# Patient Record
Sex: FEMALE | Race: WHITE | NOT HISPANIC OR LATINO
[De-identification: names, ages, dates, MRNs, and addresses within clinical notes are randomized per-mention and may not be internally consistent; named-entity substitution may affect disease eponyms.]

---

## 2018-04-18 ENCOUNTER — RESULT REVIEW (OUTPATIENT)
Age: 63
End: 2018-04-18

## 2018-12-10 PROBLEM — Z00.00 ENCOUNTER FOR PREVENTIVE HEALTH EXAMINATION: Status: ACTIVE | Noted: 2018-12-10

## 2019-01-01 ENCOUNTER — TRANSCRIPTION ENCOUNTER (OUTPATIENT)
Age: 64
End: 2019-01-01

## 2019-01-01 ENCOUNTER — INPATIENT (INPATIENT)
Facility: HOSPITAL | Age: 64
LOS: 7 days | Discharge: HOME HEALTH | End: 2019-12-24
Attending: INTERNAL MEDICINE | Admitting: INTERNAL MEDICINE
Payer: COMMERCIAL

## 2019-01-01 VITALS
WEIGHT: 179.9 LBS | SYSTOLIC BLOOD PRESSURE: 114 MMHG | HEIGHT: 72 IN | OXYGEN SATURATION: 99 % | DIASTOLIC BLOOD PRESSURE: 65 MMHG | RESPIRATION RATE: 18 BRPM | HEART RATE: 102 BPM | TEMPERATURE: 98 F

## 2019-01-01 VITALS
SYSTOLIC BLOOD PRESSURE: 103 MMHG | TEMPERATURE: 97 F | HEART RATE: 98 BPM | RESPIRATION RATE: 16 BRPM | DIASTOLIC BLOOD PRESSURE: 68 MMHG

## 2019-01-01 DIAGNOSIS — C79.9 SECONDARY MALIGNANT NEOPLASM OF UNSPECIFIED SITE: ICD-10-CM

## 2019-01-01 DIAGNOSIS — R60.1 GENERALIZED EDEMA: ICD-10-CM

## 2019-01-01 DIAGNOSIS — J90 PLEURAL EFFUSION, NOT ELSEWHERE CLASSIFIED: ICD-10-CM

## 2019-01-01 DIAGNOSIS — R06.09 OTHER FORMS OF DYSPNEA: ICD-10-CM

## 2019-01-01 DIAGNOSIS — C7B.09 SECONDARY CARCINOID TUMORS OF OTHER SITES: ICD-10-CM

## 2019-01-01 DIAGNOSIS — C7A.8 OTHER MALIGNANT NEUROENDOCRINE TUMORS: ICD-10-CM

## 2019-01-01 DIAGNOSIS — I07.1 RHEUMATIC TRICUSPID INSUFFICIENCY: ICD-10-CM

## 2019-01-01 DIAGNOSIS — I50.813 ACUTE ON CHRONIC RIGHT HEART FAILURE: ICD-10-CM

## 2019-01-01 DIAGNOSIS — I27.20 PULMONARY HYPERTENSION, UNSPECIFIED: ICD-10-CM

## 2019-01-01 DIAGNOSIS — C80.1 MALIGNANT (PRIMARY) NEOPLASM, UNSPECIFIED: ICD-10-CM

## 2019-01-01 LAB
ALBUMIN SERPL ELPH-MCNC: 2.9 G/DL — LOW (ref 3.5–5.2)
ALBUMIN SERPL ELPH-MCNC: 3.1 G/DL — LOW (ref 3.5–5.2)
ALBUMIN SERPL ELPH-MCNC: 3.1 G/DL — LOW (ref 3.5–5.2)
ALP SERPL-CCNC: 197 U/L — HIGH (ref 30–115)
ALP SERPL-CCNC: 209 U/L — HIGH (ref 30–115)
ALP SERPL-CCNC: 219 U/L — HIGH (ref 30–115)
ALT FLD-CCNC: 18 U/L — SIGNIFICANT CHANGE UP (ref 0–41)
ALT FLD-CCNC: 18 U/L — SIGNIFICANT CHANGE UP (ref 0–41)
ALT FLD-CCNC: 20 U/L — SIGNIFICANT CHANGE UP (ref 0–41)
ANION GAP SERPL CALC-SCNC: 11 MMOL/L — SIGNIFICANT CHANGE UP (ref 7–14)
ANION GAP SERPL CALC-SCNC: 12 MMOL/L — SIGNIFICANT CHANGE UP (ref 7–14)
ANION GAP SERPL CALC-SCNC: 12 MMOL/L — SIGNIFICANT CHANGE UP (ref 7–14)
ANION GAP SERPL CALC-SCNC: 13 MMOL/L — SIGNIFICANT CHANGE UP (ref 7–14)
ANION GAP SERPL CALC-SCNC: 13 MMOL/L — SIGNIFICANT CHANGE UP (ref 7–14)
ANION GAP SERPL CALC-SCNC: 15 MMOL/L — HIGH (ref 7–14)
ANION GAP SERPL CALC-SCNC: 15 MMOL/L — HIGH (ref 7–14)
ANION GAP SERPL CALC-SCNC: 9 MMOL/L — SIGNIFICANT CHANGE UP (ref 7–14)
APPEARANCE UR: CLEAR — SIGNIFICANT CHANGE UP
AST SERPL-CCNC: 27 U/L — SIGNIFICANT CHANGE UP (ref 0–41)
AST SERPL-CCNC: 30 U/L — SIGNIFICANT CHANGE UP (ref 0–41)
AST SERPL-CCNC: 41 U/L — SIGNIFICANT CHANGE UP (ref 0–41)
BACTERIA # UR AUTO: ABNORMAL
BASE EXCESS BLDV CALC-SCNC: 2.1 MMOL/L — HIGH (ref -2–2)
BASOPHILS # BLD AUTO: 0.01 K/UL — SIGNIFICANT CHANGE UP (ref 0–0.2)
BASOPHILS NFR BLD AUTO: 0.1 % — SIGNIFICANT CHANGE UP (ref 0–1)
BILIRUB SERPL-MCNC: 0.3 MG/DL — SIGNIFICANT CHANGE UP (ref 0.2–1.2)
BILIRUB SERPL-MCNC: 0.4 MG/DL — SIGNIFICANT CHANGE UP (ref 0.2–1.2)
BILIRUB SERPL-MCNC: 0.5 MG/DL — SIGNIFICANT CHANGE UP (ref 0.2–1.2)
BILIRUB UR-MCNC: ABNORMAL
BUN SERPL-MCNC: 25 MG/DL — HIGH (ref 10–20)
BUN SERPL-MCNC: 25 MG/DL — HIGH (ref 10–20)
BUN SERPL-MCNC: 27 MG/DL — HIGH (ref 10–20)
BUN SERPL-MCNC: 27 MG/DL — HIGH (ref 10–20)
BUN SERPL-MCNC: 31 MG/DL — HIGH (ref 10–20)
BUN SERPL-MCNC: 35 MG/DL — HIGH (ref 10–20)
BUN SERPL-MCNC: 38 MG/DL — HIGH (ref 10–20)
BUN SERPL-MCNC: 38 MG/DL — HIGH (ref 10–20)
CA-I SERPL-SCNC: 1.18 MMOL/L — SIGNIFICANT CHANGE UP (ref 1.12–1.3)
CALCIUM SERPL-MCNC: 8.3 MG/DL — LOW (ref 8.5–10.1)
CALCIUM SERPL-MCNC: 8.3 MG/DL — LOW (ref 8.5–10.1)
CALCIUM SERPL-MCNC: 8.5 MG/DL — SIGNIFICANT CHANGE UP (ref 8.5–10.1)
CALCIUM SERPL-MCNC: 8.5 MG/DL — SIGNIFICANT CHANGE UP (ref 8.5–10.1)
CALCIUM SERPL-MCNC: 8.7 MG/DL — SIGNIFICANT CHANGE UP (ref 8.5–10.1)
CHLORIDE SERPL-SCNC: 100 MMOL/L — SIGNIFICANT CHANGE UP (ref 98–110)
CHLORIDE SERPL-SCNC: 101 MMOL/L — SIGNIFICANT CHANGE UP (ref 98–110)
CHLORIDE SERPL-SCNC: 98 MMOL/L — SIGNIFICANT CHANGE UP (ref 98–110)
CHLORIDE SERPL-SCNC: 98 MMOL/L — SIGNIFICANT CHANGE UP (ref 98–110)
CHLORIDE SERPL-SCNC: 99 MMOL/L — SIGNIFICANT CHANGE UP (ref 98–110)
CHLORIDE SERPL-SCNC: 99 MMOL/L — SIGNIFICANT CHANGE UP (ref 98–110)
CO2 SERPL-SCNC: 23 MMOL/L — SIGNIFICANT CHANGE UP (ref 17–32)
CO2 SERPL-SCNC: 25 MMOL/L — SIGNIFICANT CHANGE UP (ref 17–32)
CO2 SERPL-SCNC: 25 MMOL/L — SIGNIFICANT CHANGE UP (ref 17–32)
CO2 SERPL-SCNC: 26 MMOL/L — SIGNIFICANT CHANGE UP (ref 17–32)
CO2 SERPL-SCNC: 27 MMOL/L — SIGNIFICANT CHANGE UP (ref 17–32)
COLOR SPEC: YELLOW — SIGNIFICANT CHANGE UP
COMMENT - URINE: SIGNIFICANT CHANGE UP
CREAT SERPL-MCNC: 0.7 MG/DL — SIGNIFICANT CHANGE UP (ref 0.7–1.5)
CREAT SERPL-MCNC: 0.8 MG/DL — SIGNIFICANT CHANGE UP (ref 0.7–1.5)
CREAT SERPL-MCNC: 0.8 MG/DL — SIGNIFICANT CHANGE UP (ref 0.7–1.5)
CREAT SERPL-MCNC: 0.9 MG/DL — SIGNIFICANT CHANGE UP (ref 0.7–1.5)
CREAT SERPL-MCNC: 1 MG/DL — SIGNIFICANT CHANGE UP (ref 0.7–1.5)
CREAT SERPL-MCNC: 1 MG/DL — SIGNIFICANT CHANGE UP (ref 0.7–1.5)
DIFF PNL FLD: NEGATIVE — SIGNIFICANT CHANGE UP
EOSINOPHIL # BLD AUTO: 0 K/UL — SIGNIFICANT CHANGE UP (ref 0–0.7)
EOSINOPHIL NFR BLD AUTO: 0 % — SIGNIFICANT CHANGE UP (ref 0–8)
EPI CELLS # UR: ABNORMAL /HPF
GAS PNL BLDV: 139 MMOL/L — SIGNIFICANT CHANGE UP (ref 136–145)
GAS PNL BLDV: SIGNIFICANT CHANGE UP
GLUCOSE SERPL-MCNC: 101 MG/DL — HIGH (ref 70–99)
GLUCOSE SERPL-MCNC: 102 MG/DL — HIGH (ref 70–99)
GLUCOSE SERPL-MCNC: 109 MG/DL — HIGH (ref 70–99)
GLUCOSE SERPL-MCNC: 118 MG/DL — HIGH (ref 70–99)
GLUCOSE SERPL-MCNC: 121 MG/DL — HIGH (ref 70–99)
GLUCOSE SERPL-MCNC: 92 MG/DL — SIGNIFICANT CHANGE UP (ref 70–99)
GLUCOSE SERPL-MCNC: 95 MG/DL — SIGNIFICANT CHANGE UP (ref 70–99)
GLUCOSE SERPL-MCNC: 99 MG/DL — SIGNIFICANT CHANGE UP (ref 70–99)
GLUCOSE UR QL: NEGATIVE MG/DL — SIGNIFICANT CHANGE UP
HCO3 BLDV-SCNC: 28 MMOL/L — SIGNIFICANT CHANGE UP (ref 22–29)
HCT VFR BLD CALC: 29.2 % — LOW (ref 37–47)
HCT VFR BLD CALC: 30 % — LOW (ref 37–47)
HCT VFR BLD CALC: 31.3 % — LOW (ref 37–47)
HCT VFR BLD CALC: 31.6 % — LOW (ref 37–47)
HCT VFR BLD CALC: 31.8 % — LOW (ref 37–47)
HCT VFR BLD CALC: 32 % — LOW (ref 37–47)
HCT VFR BLD CALC: 32.3 % — LOW (ref 37–47)
HCT VFR BLD CALC: 34 % — LOW (ref 37–47)
HCT VFR BLDA CALC: 33.5 % — LOW (ref 34–44)
HCV AB S/CO SERPL IA: 0.16 S/CO — SIGNIFICANT CHANGE UP (ref 0–0.99)
HCV AB SERPL-IMP: SIGNIFICANT CHANGE UP
HGB BLD CALC-MCNC: 10.9 G/DL — LOW (ref 14–18)
HGB BLD-MCNC: 10 G/DL — LOW (ref 12–16)
HGB BLD-MCNC: 10.2 G/DL — LOW (ref 12–16)
HGB BLD-MCNC: 10.6 G/DL — LOW (ref 12–16)
HGB BLD-MCNC: 10.7 G/DL — LOW (ref 12–16)
HGB BLD-MCNC: 10.7 G/DL — LOW (ref 12–16)
HGB BLD-MCNC: 10.8 G/DL — LOW (ref 12–16)
HGB BLD-MCNC: 11.1 G/DL — LOW (ref 12–16)
HGB BLD-MCNC: 11.3 G/DL — LOW (ref 12–16)
HYALINE CASTS # UR AUTO: ABNORMAL /LPF
IMM GRANULOCYTES NFR BLD AUTO: 0.6 % — HIGH (ref 0.1–0.3)
KETONES UR-MCNC: NEGATIVE — SIGNIFICANT CHANGE UP
LACTATE BLDV-MCNC: 1.9 MMOL/L — HIGH (ref 0.5–1.6)
LACTATE SERPL-SCNC: 2.1 MMOL/L — HIGH (ref 0.7–2)
LEUKOCYTE ESTERASE UR-ACNC: NEGATIVE — SIGNIFICANT CHANGE UP
LYMPHOCYTES # BLD AUTO: 0.6 K/UL — LOW (ref 1.2–3.4)
LYMPHOCYTES # BLD AUTO: 8.8 % — LOW (ref 20.5–51.1)
MAGNESIUM SERPL-MCNC: 1.8 MG/DL — SIGNIFICANT CHANGE UP (ref 1.8–2.4)
MAGNESIUM SERPL-MCNC: 2 MG/DL — SIGNIFICANT CHANGE UP (ref 1.8–2.4)
MCHC RBC-ENTMCNC: 33.2 G/DL — SIGNIFICANT CHANGE UP (ref 32–37)
MCHC RBC-ENTMCNC: 33.4 G/DL — SIGNIFICANT CHANGE UP (ref 32–37)
MCHC RBC-ENTMCNC: 33.4 G/DL — SIGNIFICANT CHANGE UP (ref 32–37)
MCHC RBC-ENTMCNC: 33.5 PG — HIGH (ref 27–31)
MCHC RBC-ENTMCNC: 33.6 PG — HIGH (ref 27–31)
MCHC RBC-ENTMCNC: 33.9 G/DL — SIGNIFICANT CHANGE UP (ref 32–37)
MCHC RBC-ENTMCNC: 33.9 G/DL — SIGNIFICANT CHANGE UP (ref 32–37)
MCHC RBC-ENTMCNC: 33.9 PG — HIGH (ref 27–31)
MCHC RBC-ENTMCNC: 34 G/DL — SIGNIFICANT CHANGE UP (ref 32–37)
MCHC RBC-ENTMCNC: 34.2 G/DL — SIGNIFICANT CHANGE UP (ref 32–37)
MCHC RBC-ENTMCNC: 34.3 PG — HIGH (ref 27–31)
MCHC RBC-ENTMCNC: 34.4 PG — HIGH (ref 27–31)
MCHC RBC-ENTMCNC: 34.5 PG — HIGH (ref 27–31)
MCHC RBC-ENTMCNC: 34.9 G/DL — SIGNIFICANT CHANGE UP (ref 32–37)
MCV RBC AUTO: 100 FL — HIGH (ref 81–99)
MCV RBC AUTO: 100.3 FL — HIGH (ref 81–99)
MCV RBC AUTO: 100.6 FL — HIGH (ref 81–99)
MCV RBC AUTO: 100.7 FL — HIGH (ref 81–99)
MCV RBC AUTO: 100.9 FL — HIGH (ref 81–99)
MCV RBC AUTO: 101 FL — HIGH (ref 81–99)
MCV RBC AUTO: 98.5 FL — SIGNIFICANT CHANGE UP (ref 81–99)
MCV RBC AUTO: 99.1 FL — HIGH (ref 81–99)
MONOCYTES # BLD AUTO: 0.57 K/UL — SIGNIFICANT CHANGE UP (ref 0.1–0.6)
MONOCYTES NFR BLD AUTO: 8.3 % — SIGNIFICANT CHANGE UP (ref 1.7–9.3)
NEUTROPHILS # BLD AUTO: 5.62 K/UL — SIGNIFICANT CHANGE UP (ref 1.4–6.5)
NEUTROPHILS NFR BLD AUTO: 82.2 % — HIGH (ref 42.2–75.2)
NITRITE UR-MCNC: NEGATIVE — SIGNIFICANT CHANGE UP
NRBC # BLD: 0 /100 WBCS — SIGNIFICANT CHANGE UP (ref 0–0)
NT-PROBNP SERPL-SCNC: 1614 PG/ML — HIGH (ref 0–300)
PCO2 BLDV: 47 MMHG — SIGNIFICANT CHANGE UP (ref 41–51)
PH BLDV: 7.37 — SIGNIFICANT CHANGE UP (ref 7.26–7.43)
PH UR: 5.5 — SIGNIFICANT CHANGE UP (ref 5–8)
PLATELET # BLD AUTO: 164 K/UL — SIGNIFICANT CHANGE UP (ref 130–400)
PLATELET # BLD AUTO: 205 K/UL — SIGNIFICANT CHANGE UP (ref 130–400)
PLATELET # BLD AUTO: 211 K/UL — SIGNIFICANT CHANGE UP (ref 130–400)
PLATELET # BLD AUTO: 214 K/UL — SIGNIFICANT CHANGE UP (ref 130–400)
PLATELET # BLD AUTO: 215 K/UL — SIGNIFICANT CHANGE UP (ref 130–400)
PLATELET # BLD AUTO: 217 K/UL — SIGNIFICANT CHANGE UP (ref 130–400)
PLATELET # BLD AUTO: 217 K/UL — SIGNIFICANT CHANGE UP (ref 130–400)
PLATELET # BLD AUTO: 231 K/UL — SIGNIFICANT CHANGE UP (ref 130–400)
PO2 BLDV: 26 MMHG — SIGNIFICANT CHANGE UP (ref 20–40)
POTASSIUM BLDV-SCNC: 4.3 MMOL/L — SIGNIFICANT CHANGE UP (ref 3.3–5.6)
POTASSIUM SERPL-MCNC: 3.9 MMOL/L — SIGNIFICANT CHANGE UP (ref 3.5–5)
POTASSIUM SERPL-MCNC: 4 MMOL/L — SIGNIFICANT CHANGE UP (ref 3.5–5)
POTASSIUM SERPL-MCNC: 4 MMOL/L — SIGNIFICANT CHANGE UP (ref 3.5–5)
POTASSIUM SERPL-MCNC: 4.1 MMOL/L — SIGNIFICANT CHANGE UP (ref 3.5–5)
POTASSIUM SERPL-MCNC: 4.1 MMOL/L — SIGNIFICANT CHANGE UP (ref 3.5–5)
POTASSIUM SERPL-MCNC: 4.3 MMOL/L — SIGNIFICANT CHANGE UP (ref 3.5–5)
POTASSIUM SERPL-MCNC: 4.3 MMOL/L — SIGNIFICANT CHANGE UP (ref 3.5–5)
POTASSIUM SERPL-MCNC: 5.5 MMOL/L — HIGH (ref 3.5–5)
POTASSIUM SERPL-SCNC: 3.9 MMOL/L — SIGNIFICANT CHANGE UP (ref 3.5–5)
POTASSIUM SERPL-SCNC: 4 MMOL/L — SIGNIFICANT CHANGE UP (ref 3.5–5)
POTASSIUM SERPL-SCNC: 4 MMOL/L — SIGNIFICANT CHANGE UP (ref 3.5–5)
POTASSIUM SERPL-SCNC: 4.1 MMOL/L — SIGNIFICANT CHANGE UP (ref 3.5–5)
POTASSIUM SERPL-SCNC: 4.1 MMOL/L — SIGNIFICANT CHANGE UP (ref 3.5–5)
POTASSIUM SERPL-SCNC: 4.3 MMOL/L — SIGNIFICANT CHANGE UP (ref 3.5–5)
POTASSIUM SERPL-SCNC: 4.3 MMOL/L — SIGNIFICANT CHANGE UP (ref 3.5–5)
POTASSIUM SERPL-SCNC: 5.5 MMOL/L — HIGH (ref 3.5–5)
PROT SERPL-MCNC: 5.7 G/DL — LOW (ref 6–8)
PROT SERPL-MCNC: 5.7 G/DL — LOW (ref 6–8)
PROT SERPL-MCNC: 6.1 G/DL — SIGNIFICANT CHANGE UP (ref 6–8)
PROT UR-MCNC: 30 MG/DL
RBC # BLD: 2.9 M/UL — LOW (ref 4.2–5.4)
RBC # BLD: 2.97 M/UL — LOW (ref 4.2–5.4)
RBC # BLD: 3.16 M/UL — LOW (ref 4.2–5.4)
RBC # BLD: 3.16 M/UL — LOW (ref 4.2–5.4)
RBC # BLD: 3.18 M/UL — LOW (ref 4.2–5.4)
RBC # BLD: 3.22 M/UL — LOW (ref 4.2–5.4)
RBC # BLD: 3.23 M/UL — LOW (ref 4.2–5.4)
RBC # BLD: 3.37 M/UL — LOW (ref 4.2–5.4)
RBC # FLD: 16.1 % — HIGH (ref 11.5–14.5)
RBC # FLD: 16.3 % — HIGH (ref 11.5–14.5)
RBC # FLD: 16.3 % — HIGH (ref 11.5–14.5)
RBC # FLD: 16.5 % — HIGH (ref 11.5–14.5)
RBC # FLD: 16.7 % — HIGH (ref 11.5–14.5)
RBC # FLD: 16.9 % — HIGH (ref 11.5–14.5)
RBC CASTS # UR COMP ASSIST: SIGNIFICANT CHANGE UP /HPF
SAO2 % BLDV: 35 % — SIGNIFICANT CHANGE UP
SODIUM SERPL-SCNC: 135 MMOL/L — SIGNIFICANT CHANGE UP (ref 135–146)
SODIUM SERPL-SCNC: 136 MMOL/L — SIGNIFICANT CHANGE UP (ref 135–146)
SODIUM SERPL-SCNC: 137 MMOL/L — SIGNIFICANT CHANGE UP (ref 135–146)
SODIUM SERPL-SCNC: 137 MMOL/L — SIGNIFICANT CHANGE UP (ref 135–146)
SODIUM SERPL-SCNC: 138 MMOL/L — SIGNIFICANT CHANGE UP (ref 135–146)
SODIUM SERPL-SCNC: 138 MMOL/L — SIGNIFICANT CHANGE UP (ref 135–146)
SODIUM SERPL-SCNC: 139 MMOL/L — SIGNIFICANT CHANGE UP (ref 135–146)
SODIUM SERPL-SCNC: 139 MMOL/L — SIGNIFICANT CHANGE UP (ref 135–146)
SP GR SPEC: >=1.03 (ref 1.01–1.03)
TROPONIN T SERPL-MCNC: <0.01 NG/ML — SIGNIFICANT CHANGE UP
UROBILINOGEN FLD QL: 0.2 MG/DL — SIGNIFICANT CHANGE UP (ref 0.2–0.2)
WBC # BLD: 5.14 K/UL — SIGNIFICANT CHANGE UP (ref 4.8–10.8)
WBC # BLD: 5.44 K/UL — SIGNIFICANT CHANGE UP (ref 4.8–10.8)
WBC # BLD: 5.85 K/UL — SIGNIFICANT CHANGE UP (ref 4.8–10.8)
WBC # BLD: 5.89 K/UL — SIGNIFICANT CHANGE UP (ref 4.8–10.8)
WBC # BLD: 6.19 K/UL — SIGNIFICANT CHANGE UP (ref 4.8–10.8)
WBC # BLD: 6.62 K/UL — SIGNIFICANT CHANGE UP (ref 4.8–10.8)
WBC # BLD: 6.84 K/UL — SIGNIFICANT CHANGE UP (ref 4.8–10.8)
WBC # BLD: 7.32 K/UL — SIGNIFICANT CHANGE UP (ref 4.8–10.8)
WBC # FLD AUTO: 5.14 K/UL — SIGNIFICANT CHANGE UP (ref 4.8–10.8)
WBC # FLD AUTO: 5.44 K/UL — SIGNIFICANT CHANGE UP (ref 4.8–10.8)
WBC # FLD AUTO: 5.85 K/UL — SIGNIFICANT CHANGE UP (ref 4.8–10.8)
WBC # FLD AUTO: 5.89 K/UL — SIGNIFICANT CHANGE UP (ref 4.8–10.8)
WBC # FLD AUTO: 6.19 K/UL — SIGNIFICANT CHANGE UP (ref 4.8–10.8)
WBC # FLD AUTO: 6.62 K/UL — SIGNIFICANT CHANGE UP (ref 4.8–10.8)
WBC # FLD AUTO: 6.84 K/UL — SIGNIFICANT CHANGE UP (ref 4.8–10.8)
WBC # FLD AUTO: 7.32 K/UL — SIGNIFICANT CHANGE UP (ref 4.8–10.8)
WBC UR QL: SIGNIFICANT CHANGE UP /HPF

## 2019-01-01 PROCEDURE — 99223 1ST HOSP IP/OBS HIGH 75: CPT

## 2019-01-01 PROCEDURE — 99238 HOSP IP/OBS DSCHRG MGMT 30/<: CPT

## 2019-01-01 PROCEDURE — 99233 SBSQ HOSP IP/OBS HIGH 50: CPT

## 2019-01-01 PROCEDURE — 71046 X-RAY EXAM CHEST 2 VIEWS: CPT | Mod: 26

## 2019-01-01 PROCEDURE — 71260 CT THORAX DX C+: CPT | Mod: 26

## 2019-01-01 PROCEDURE — 99232 SBSQ HOSP IP/OBS MODERATE 35: CPT

## 2019-01-01 PROCEDURE — 99497 ADVNCD CARE PLAN 30 MIN: CPT | Mod: 25

## 2019-01-01 PROCEDURE — 71045 X-RAY EXAM CHEST 1 VIEW: CPT | Mod: 26

## 2019-01-01 PROCEDURE — 93970 EXTREMITY STUDY: CPT | Mod: 26

## 2019-01-01 PROCEDURE — 99285 EMERGENCY DEPT VISIT HI MDM: CPT

## 2019-01-01 RX ORDER — HYDROCORTISONE 1 %
1 OINTMENT (GRAM) TOPICAL
Refills: 0 | Status: DISCONTINUED | OUTPATIENT
Start: 2019-01-01 | End: 2019-01-01

## 2019-01-01 RX ORDER — FUROSEMIDE 40 MG
40 TABLET ORAL ONCE
Refills: 0 | Status: COMPLETED | OUTPATIENT
Start: 2019-01-01 | End: 2019-01-01

## 2019-01-01 RX ORDER — FUROSEMIDE 40 MG
20 TABLET ORAL DAILY
Refills: 0 | Status: DISCONTINUED | OUTPATIENT
Start: 2019-01-01 | End: 2019-01-01

## 2019-01-01 RX ORDER — FUROSEMIDE 40 MG
40 TABLET ORAL DAILY
Refills: 0 | Status: DISCONTINUED | OUTPATIENT
Start: 2019-01-01 | End: 2019-01-01

## 2019-01-01 RX ORDER — ENOXAPARIN SODIUM 100 MG/ML
40 INJECTION SUBCUTANEOUS DAILY
Refills: 0 | Status: DISCONTINUED | OUTPATIENT
Start: 2019-01-01 | End: 2019-01-01

## 2019-01-01 RX ORDER — CHLORHEXIDINE GLUCONATE 213 G/1000ML
1 SOLUTION TOPICAL
Refills: 0 | Status: DISCONTINUED | OUTPATIENT
Start: 2019-01-01 | End: 2019-01-01

## 2019-01-01 RX ORDER — FUROSEMIDE 40 MG
1 TABLET ORAL
Qty: 90 | Refills: 0
Start: 2019-01-01 | End: 2020-03-22

## 2019-01-01 RX ORDER — HYDROCORTISONE 1 %
1 OINTMENT (GRAM) TOPICAL ONCE
Refills: 0 | Status: COMPLETED | OUTPATIENT
Start: 2019-01-01 | End: 2019-01-01

## 2019-01-01 RX ORDER — FUROSEMIDE 40 MG
20 TABLET ORAL ONCE
Refills: 0 | Status: COMPLETED | OUTPATIENT
Start: 2019-01-01 | End: 2019-01-01

## 2019-01-01 RX ADMIN — Medication 20 MILLIGRAM(S): at 06:23

## 2019-01-01 RX ADMIN — Medication 40 MILLIGRAM(S): at 10:08

## 2019-01-01 RX ADMIN — ENOXAPARIN SODIUM 40 MILLIGRAM(S): 100 INJECTION SUBCUTANEOUS at 12:28

## 2019-01-01 RX ADMIN — CHLORHEXIDINE GLUCONATE 1 APPLICATION(S): 213 SOLUTION TOPICAL at 12:29

## 2019-01-01 RX ADMIN — Medication 1 SUPPOSITORY(S): at 21:13

## 2019-01-01 RX ADMIN — ENOXAPARIN SODIUM 40 MILLIGRAM(S): 100 INJECTION SUBCUTANEOUS at 11:22

## 2019-01-01 RX ADMIN — Medication 40 MILLIGRAM(S): at 06:11

## 2019-01-01 RX ADMIN — ENOXAPARIN SODIUM 40 MILLIGRAM(S): 100 INJECTION SUBCUTANEOUS at 14:53

## 2019-01-01 RX ADMIN — Medication 40 MILLIGRAM(S): at 21:53

## 2019-01-01 RX ADMIN — Medication 40 MILLIGRAM(S): at 06:01

## 2019-01-01 RX ADMIN — Medication 40 MILLIGRAM(S): at 12:38

## 2019-01-01 RX ADMIN — Medication 40 MILLIGRAM(S): at 05:13

## 2019-01-01 RX ADMIN — Medication 40 MILLIGRAM(S): at 11:14

## 2019-01-01 RX ADMIN — Medication 20 MILLIGRAM(S): at 17:17

## 2019-01-08 ENCOUNTER — APPOINTMENT (OUTPATIENT)
Dept: VASCULAR SURGERY | Facility: CLINIC | Age: 64
End: 2019-01-08
Payer: COMMERCIAL

## 2019-01-08 VITALS — BODY MASS INDEX: 21.13 KG/M2 | HEIGHT: 72 IN | WEIGHT: 156 LBS

## 2019-01-08 DIAGNOSIS — Z78.9 OTHER SPECIFIED HEALTH STATUS: ICD-10-CM

## 2019-01-08 DIAGNOSIS — L30.9 DERMATITIS, UNSPECIFIED: ICD-10-CM

## 2019-01-08 DIAGNOSIS — Z87.891 PERSONAL HISTORY OF NICOTINE DEPENDENCE: ICD-10-CM

## 2019-01-08 DIAGNOSIS — C7A.8 OTHER MALIGNANT NEUROENDOCRINE TUMORS: ICD-10-CM

## 2019-01-08 DIAGNOSIS — I07.1 RHEUMATIC TRICUSPID INSUFFICIENCY: ICD-10-CM

## 2019-01-08 DIAGNOSIS — Z87.19 PERSONAL HISTORY OF OTHER DISEASES OF THE DIGESTIVE SYSTEM: ICD-10-CM

## 2019-01-08 DIAGNOSIS — M79.89 OTHER SPECIFIED SOFT TISSUE DISORDERS: ICD-10-CM

## 2019-01-08 PROCEDURE — 99203 OFFICE O/P NEW LOW 30 MIN: CPT

## 2019-01-08 RX ORDER — CLOBETASOL PROPIONATE 0.5 MG/G
0.05 GEL TOPICAL TWICE DAILY
Qty: 30 | Refills: 0 | Status: ACTIVE | COMMUNITY
Start: 2019-01-08 | End: 1900-01-01

## 2019-01-08 RX ORDER — ASCORBIC ACID 500 MG
TABLET ORAL
Refills: 0 | Status: ACTIVE | COMMUNITY

## 2019-01-08 RX ORDER — MULTIVITAMIN/IRON/FOLIC ACID 18MG-0.4MG
TABLET ORAL
Refills: 0 | Status: ACTIVE | COMMUNITY

## 2019-01-08 RX ORDER — DOCUSATE SODIUM 100 MG/1
TABLET ORAL
Refills: 0 | Status: ACTIVE | COMMUNITY

## 2019-01-08 RX ORDER — UBIDECARENONE/VIT E ACET 100MG-5
CAPSULE ORAL
Refills: 0 | Status: ACTIVE | COMMUNITY

## 2019-01-08 NOTE — ASSESSMENT
[FreeTextEntry1] : The patient is a 63-year-old female with some mild dermatitis antalgic cages to her lower extremities. I recommend for basal cream to be applied to her ankle region daily to see if this improves her symptoms. I also recommend she may benefit from following up with dermatology. No vascular surgery intervention at this time.

## 2019-01-08 NOTE — HISTORY OF PRESENT ILLNESS
[FreeTextEntry1] : Is a 63-year-old female who presents complaining of discoloration and mild edema to her ankles bilaterally. 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

## 2019-12-16 NOTE — ED PROVIDER NOTE - OBJECTIVE STATEMENT
Patient c/o feeling weak, increasing SOB and CASPER over past few weeks, Getting worse past few days. H/o metastatic CA. Leg swelling, cough, no fever. C/o decrease in appetite. Seen by [facility] last week , CT chest and abdomen done, Results in patients phone,

## 2019-12-16 NOTE — ED ADULT TRIAGE NOTE - CHIEF COMPLAINT QUOTE
Patient complaining of difficulty breathing that causes lightheadedness and chest pressure that started 7 days ago. Denies chest pressure at this time [id number] RA on triage.

## 2019-12-16 NOTE — ED ADULT NURSE REASSESSMENT NOTE - NS ED NURSE REASSESS COMMENT FT1
EZEKIEL PAUL PLACE A #22 BF dIFFUSICS ANGIO TO PT RIGHT WRIST - CT TECH NOTIFIED- #22 GAUGE CAN BE INJECTED AT 6.5 ML/SEC / 325PI

## 2019-12-16 NOTE — H&P ADULT - HISTORY OF PRESENT ILLNESS
[age]yo female with history of metastatic cancer (carcinoid?) presents to the ER due to shortness of breath and dizziness with exertion. Has been on lasix in the past

## 2019-12-16 NOTE — H&P ADULT - NSHPLABSRESULTS_GEN_ALL_CORE
< from: CT Chest w/ IV Cont (12.16.19 @ 21:05) >    EXAM:  CT CHEST IC          PROCEDURE DATE:  12/16/2019      IMPRESSION:    No evidence of acute pulmonary emboli.    Bilateral moderate pleural effusions. Mild cardiomegaly with reflux of   contrast within the IVC and hepatic veins compatible with right heart   dysfunction.    Multiple chest wall soft tissue nodular lesions with reactive bony   changes of the adjacent ribs bilaterally. Partially imaged upper   intra-abdominal lymphadenopathy. Mottled appearance of the vertebral   column as above. Findings are consistent with patient's known metastatic   disease.        NAI MCLAUGHLIN M.D., RESIDENT RADIOLOGIST  This document has been electronically signed.  SAIMA FROST M.D., ATTENDING RADIOLOGIST  This document has been electronically signed. Dec 16 2019  9:50PM        < end of copied text >                          11.3   6.84  )-----------( 164      ( 16 Dec 2019 18:46 )             34.0     12-16    138  |  100  |  38<H>  ----------------------------<  109<H>  5.5<H>   |  23  |  1.0    Ca    8.7      16 Dec 2019 18:46  Mg     2.0     12-16    TPro  6.1  /  Alb  3.1<L>  /  TBili  0.5  /  DBili  x   /  AST  41  /  ALT  18  /  AlkPhos  197<H>  12-16          Urinalysis Basic - ( 16 Dec 2019 21:40 )    Color: Yellow / Appearance: Clear / SG: >=1.030 / pH: x  Gluc: x / Ketone: Negative  / Bili: Moderate / Urobili: 0.2 mg/dL   Blood: x / Protein: 30 mg/dL / Nitrite: Negative   Leuk Esterase: Negative / RBC: 1-2 /HPF / WBC 1-2 /HPF   Sq Epi: x / Non Sq Epi: Moderate /HPF / Bacteria: Moderate        Lactate Trend  12-16 @ 18:46 Lactate:2.1     CARDIAC MARKERS ( 16 Dec 2019 18:46 )  x     / <0.01 ng/mL / x     / x     / x          CAPILLARY BLOOD GLUCOSE

## 2019-12-16 NOTE — ED PROVIDER NOTE - ATTENDING CONTRIBUTION TO CARE
65yo F history of carcinoid tumor with diffuse mets followed a [facility] no current active treatment presenting with shortness of breath, CASPER, worsening diffuse swelling over the past few days getting progressively worse. No f/c/n/v/d, chest pain. Had CT CAP last week showing b/l pleural effusions, hepatic mets, hepatic congestion. Per pt, has previously been on diuretics with minimal relief. Pt unable to walk a few steps wo getting significantly shortness of breath   Constitutional: chronically ill appearing Non toxic.   Eyes: PERRLA. Extraocular movements intact, no entrapment. Conjunctiva pale   ENT: No nasal discharge. Moist mucus membranes.  Neck: Supple, non tender, full range of motion.  CV: RRR no murmurs, rubs, or gallops. +S1S2.   Pulm: diminished bilateral bases. Normal work of breathing.  Abd: soft NT ND +BS.   Ext: Warm and well perfused x4, moving all extremities, 2+ pitting edema to the bilateral lower extremities  up to lower abdomen. +LUE edema. 2+ equal pulses throughout   Psy: Cooperative, appropriate.   Skin: Warm, dry, +diffuse anasarca up to abdomen  Neuro: CN2-12 grossly intact no sensory or motor deficits throughout, no drift

## 2019-12-16 NOTE — ED PROVIDER NOTE - CLINICAL SUMMARY MEDICAL DECISION MAKING FREE TEXT BOX
65yo F history of carcinoid tumor with diffuse mets followed a [facility] no current active treatment presenting with shortness of breath, CASPER, worsening diffuse swelling over the past few days getting progressively worse. No f/c/n/v/d, chest pain. Had CT CAP last week showing b/l pleural effusions, hepatic mets, hepatic congestion. Per pt, has previously been on diuretics with minimal relief. Pt unable to walk a few steps wo getting significantly shortness of breath. labs ekg imaging reviewed. will admit

## 2019-12-17 NOTE — PROGRESS NOTE ADULT - PROBLEM SELECTOR PLAN 1
etiology?  echo done today - report pending  on IV lasix - increase dose to 40mg daily  repeat cxr in am to demonstrate improvement in effusions  pt not requiring oxygen

## 2019-12-17 NOTE — PATIENT PROFILE ADULT - NSPROPTRIGHTCAREGIVER_GEN_A_NUR
I spoke over the telephone with Ms Skye Pena for telephone follow-up about her daughter, the patient  The mother states that the patient is doing very well, her symptoms are nearly resolved, and is acting as if she was never even sick "  She has been taking her antibiotics twice daily without difficulty, is tolerating them well at this time  She was able to schedule an appointment with the patient's pediatrician which is scheduled for tomorrow  I asked Ms Skye Pena if she had any further questions at this time, she stated that all her questions have been answered appropriately  no

## 2019-12-17 NOTE — GOALS OF CARE CONVERSATION - ADVANCED CARE PLANNING - CONVERSATION DETAILS
discussed diagnosis and prognosis with the pt  she says she believes she signed a DNR in the past but isn't sure where it its  she would like to think about it and come back to this question tomorrow.

## 2019-12-17 NOTE — PROGRESS NOTE ADULT - SUBJECTIVE AND OBJECTIVE BOX
JANICE COURTNEY  64y  Female      Patient is a 64y old  Female who presents with a chief complaint of shortness of breath.     INTERVAL HPI/OVERNIGHT EVENTS:  Patient seen and examined earlier this afternoon.  She is lying in bed. Not in any acute distress.  Does complain of SOB with exertion.        REVIEW OF SYSTEMS:  CONSTITUTIONAL: No fever, weight loss, or fatigue  EYES: No eye pain, visual disturbances, or discharge  ENMT:  No difficulty hearing, tinnitus, vertigo; No sinus or throat pain  NECK: No pain or stiffness  RESPIRATORY: shortness of breath with exertion   CARDIOVASCULAR: No chest pain, palpitations, dizziness, or leg swelling  GASTROINTESTINAL: No abdominal or epigastric pain. No nausea, vomiting, or hematemesis; No diarrhea or constipation. No melena or hematochezia.  GENITOURINARY: No dysuria, frequency, hematuria, or incontinence  NEUROLOGICAL: No headaches, memory loss, loss of strength, numbness, or tremors  SKIN: No itching, burning, rashes, or lesions   LYMPH NODES: No enlarged glands  ENDOCRINE: No heat or cold intolerance; No hair loss  MUSCULOSKELETAL: No joint pain or swelling; No muscle, back, or extremity pain  PSYCHIATRIC: No depression, anxiety, mood swings, or difficulty sleeping  HEME/LYMPH: No easy bruising, or bleeding gums  ALLERY AND IMMUNOLOGIC: No hives or eczema    T(C): 35.7 (12-17-19 @ 13:02), Max: 36.8 (12-16-19 @ 16:39)  HR: 88 (12-17-19 @ 13:02) (68 - 102)  BP: 97/62 (12-17-19 @ 13:02) (97/62 - 115/65)  RR: 18 (12-17-19 @ 13:02) (18 - 18)  SpO2: 96% (12-17-19 @ 05:50) (96% - 99%)    PHYSICAL EXAM:  GENERAL: NAD, well-groomed, well-developed  HEAD:  Atraumatic, Normocephalic  EYES: EOMI, PERRLA, conjunctiva and sclera clear  ENMT: No tonsillar erythema, exudates, or enlargement; Moist mucous membranes, Good dentition, No lesions  NECK: Supple, No JVD, Normal thyroid  NERVOUS SYSTEM:  Alert & Oriented X3, Good concentration; Motor Strength 5/5 B/L upper and lower extremities; DTRs 2+ intact and symmetric  CHEST/LUNG: decreased breath sounds b/l  HEART: Regular rate and rhythm; No murmurs, rubs, or gallops  ABDOMEN: Soft, Nontender, Nondistended; Bowel sounds present  EXTREMITIES:  2+ Peripheral Pulses, No clubbing, cyanosis, or edema  LYMPH: No lymphadenopathy noted  SKIN: No rashes or lesions    Consultant(s) Notes Reviewed:  [x ] YES  [ ] NO  Care Discussed with Consultants/Other Providers [ x] YES  [ ] NO    LAB:                        10.7   7.32  )-----------( 215      ( 17 Dec 2019 11:24 )             31.6     12-17    139  |  99  |  38<H>  ----------------------------<  101<H>  4.1   |  25  |  1.0    Ca    8.7      17 Dec 2019 11:24  Mg     2.0     12-16    TPro  5.7<L>  /  Alb  3.1<L>  /  TBili  0.3  /  DBili  x   /  AST  27  /  ALT  18  /  AlkPhos  209<H>  12-17    LIVER FUNCTIONS - ( 17 Dec 2019 11:24 )  Alb: 3.1 g/dL / Pro: 5.7 g/dL / ALK PHOS: 209 U/L / ALT: 18 U/L / AST: 27 U/L / GGT: x           CARDIAC MARKERS ( 16 Dec 2019 18:46 )  x     / <0.01 ng/mL / x     / x     / x            Drug Dosing Weight  Height (cm): 182.9 (17 Dec 2019 13:02)  Weight (kg): 84.3 (17 Dec 2019 13:02)  BMI (kg/m2): 25.2 (17 Dec 2019 13:02)  BSA (m2): 2.07 (17 Dec 2019 13:02)      Urinalysis Basic - ( 16 Dec 2019 21:40 )    Color: Yellow / Appearance: Clear / SG: >=1.030 / pH: x  Gluc: x / Ketone: Negative  / Bili: Moderate / Urobili: 0.2 mg/dL   Blood: x / Protein: 30 mg/dL / Nitrite: Negative   Leuk Esterase: Negative / RBC: 1-2 /HPF / WBC 1-2 /HPF   Sq Epi: x / Non Sq Epi: Moderate /HPF / Bacteria: Moderate        RADIOLOGY & ADDITIONAL TESTS:  Imaging Personally Reviewed:  [x] YES  [ ] NO    < from: CT Chest w/ IV Cont (12.16.19 @ 21:05) >  IMPRESSION:    No evidence of acute pulmonary emboli.    Bilateral moderate pleural effusions. Mild cardiomegaly with reflux of   contrast within the IVC and hepatic veins compatible with right heart   dysfunction.    Multiple chest wall soft tissue nodular lesions with reactive bony   changes of the adjacent ribs bilaterally. Partially imaged upper   intra-abdominal lymphadenopathy. Mottled appearance of the vertebral   column as above. Findings are consistent with patient's known metastatic   disease.    < end of copied text >    HEALTH ISSUES - PROBLEM Dx:  Cancer: Cancer  Anasarca: Anasarca  Dyspnea on exertion: Dyspnea on exertion      MEDICATIONS  (STANDING):  furosemide   Injectable 20 milliGRAM(s) IV Push once    MEDICATIONS  (PRN):        Progress Note Handoff  Pending Consults: none  Pending Tests: cxr, echo  Pending Results: echo  Family Discussion: discussed tests with pt - in agreement   Disposition: Home_____/SNF______/Other_____/Unknown at this time_____    Please call me with any questions at extension [phone]

## 2019-12-18 NOTE — CONSULT NOTE ADULT - ASSESSMENT
Patient has carcinoid. Echo severe TI mild PHTN. EF 55%. Right sided fluid secondary TI and decreased alb. This is secondary carcinoid.. Lasix iv. Follow lytes and weight. dietician consult re low alb. F/U leighton

## 2019-12-18 NOTE — DIETITIAN INITIAL EVALUATION ADULT. - OTHER INFO
Pertinent Medical Information: p/w SOB and dizziness with exertion. Dyspnea on Exertion likely secondary to severe TR per progress notes. Neuroendocrine CA noted.    Pertinent Subjective Information: Unable to obtain nutrition history at this time; pt unarousable at time of RD visit. No response from emergency contact. No family at bedside. Lactose intolerance & gluten allergy noted.

## 2019-12-18 NOTE — DIETITIAN INITIAL EVALUATION ADULT. - PHYSICAL APPEARANCE
BMI: 25.2. Sleeping, unarousable at time of RD visit. Noted alert. Last BM 12/17 per staff. No chewing/swallowing difficulty reported at this time. Skin: Wound to R shin.

## 2019-12-18 NOTE — DIETITIAN INITIAL EVALUATION ADULT. - ENERGY NEEDS
Energy: 9888-4137 kcal/day (MSJx1.2-1.3 AF)    Protein:  g/day (1-1.2 g/kg ABW)    Fluids: 1 mL/kcal

## 2019-12-18 NOTE — DIETITIAN INITIAL EVALUATION ADULT. - RD TO REMAIN AVAILABLE
yes/Nutrition goals: Pt to maintain tolerance to diet order with at least 75% po intake maintained over next 7 days. Nutrition Intervention: Meals & Snacks. Monitor: Energy intake, glucose profile, renal profile, nutrition focused physical findings, body composition.

## 2019-12-18 NOTE — DIETITIAN INITIAL EVALUATION ADULT. - DIET TYPE
DASH/TLC + gluten-gliadin restriction & lactose restricted. Per staff, pt with fair appetite at this time, consuming 75% of meals currently.

## 2019-12-18 NOTE — PROGRESS NOTE ADULT - ASSESSMENT
65yo female with history of metastatic cancer (carcinoid?) presents to the ER due to shortness of breath and dizziness with exertion.     1. Dyspnea on Exertion likely secondary to severe TR: Pt aware of her severe TR and has been followed by cardio outpatient. Continue IV Lasix 40mg Daily. Monitor I/Os. Will obtain cardio consult here  2. Neuroendocrine CA: Follows [facility], not on active treatment]    GI/DVT PPX    #Progress Note Handoff  Pending (specify): Cardio f/u  Family discussion: d/w pt regarding IV diuresis  Disposition: Home

## 2019-12-18 NOTE — PROGRESS NOTE ADULT - SUBJECTIVE AND OBJECTIVE BOX
JANICE COURTNEY  64y, Female  Allergy: Gluten (Unknown)  lactose (Unknown)  No Known Drug Allergies    Hospital Day: 2d    Patient seen and examined earlier today. No acute events overnight.    PMH/PSH:  PAST MEDICAL & SURGICAL HISTORY:  Cancer  No significant past surgical history    VITALS:  T(F): 96.1 (12-18-19 @ 05:00), Max: 96.6 (12-17-19 @ 21:39)  HR: 88 (12-18-19 @ 05:00)  BP: 98/60 (12-18-19 @ 05:00) (97/62 - 112/74)  RR: 18 (12-18-19 @ 06:31)  SpO2: 97% (12-18-19 @ 08:04)    TESTS & MEASUREMENTS:  Weight (Kg): 84.3 (12-17-19 @ 13:02)  BMI (kg/m2): 25.2 (12-17)                          10.7   5.14  )-----------( 211      ( 18 Dec 2019 07:14 )             32.0       12-18    138  |  100  |  35<H>  ----------------------------<  92  3.9   |  26  |  0.8    Ca    8.7      18 Dec 2019 07:14  Mg     2.0     12-16    TPro  5.7<L>  /  Alb  2.9<L>  /  TBili  0.4  /  DBili  x   /  AST  30  /  ALT  20  /  AlkPhos  219<H>  12-18    LIVER FUNCTIONS - ( 18 Dec 2019 07:14 )  Alb: 2.9 g/dL / Pro: 5.7 g/dL / ALK PHOS: 219 U/L / ALT: 20 U/L / AST: 30 U/L / GGT: x           CARDIAC MARKERS ( 16 Dec 2019 18:46 )  x     / <0.01 ng/mL / x     / x     / x        Urinalysis Basic - ( 16 Dec 2019 21:40 )    Color: Yellow / Appearance: Clear / SG: >=1.030 / pH: x  Gluc: x / Ketone: Negative  / Bili: Moderate / Urobili: 0.2 mg/dL   Blood: x / Protein: 30 mg/dL / Nitrite: Negative   Leuk Esterase: Negative / RBC: 1-2 /HPF / WBC 1-2 /HPF   Sq Epi: x / Non Sq Epi: Moderate /HPF / Bacteria: Moderate    MEDICATIONS:  MEDICATIONS  (STANDING):  furosemide   Injectable 40 milliGRAM(s) IV Push daily  furosemide   Injectable 40 milliGRAM(s) IV Push once    MEDICATIONS  (PRN):    REVIEW OF SYSTEMS:  All other review of systems is negative unless indicated above.     PHYSICAL EXAM:  GENERAL: NAD  HEENT: No Swelling  CHEST/LUNG: Good air entry, No wheezing  HEART: RRR, No murmurs  ABDOMEN: Soft, Bowel sounds present  EXTREMITIES:  No clubbing

## 2019-12-18 NOTE — CONSULT NOTE ADULT - SUBJECTIVE AND OBJECTIVE BOX
CARDIOLOGY CONSULT NOTE     CHIEF COMPLAINT/REASON FOR CONSULT:    HPI:  65yo female with history of metastatic cancer (carcinoid?) presents to the ER due to shortness of breath and dizziness with exertion. Has been on lasix in the past (16 Dec 2019 21:56)      PAST MEDICAL & SURGICAL HISTORY:  Cancer  No significant past surgical history      Cardiac Risks:   [ ]HTN, [ ] DM, [ ] Smoking, [ ] FH,  [ ] Lipids        MEDICATIONS:  MEDICATIONS  (STANDING):  furosemide   Injectable 40 milliGRAM(s) IV Push daily      FAMILY HISTORY:      SOCIAL HISTORY:      [ ] Marital status    Allergies    Gluten (Unknown)  lactose (Unknown)  No Known Drug Allergies        	    REVIEW OF SYSTEMS:  CONSTITUTIONAL: No fever, weight loss, or fatigue  EYES: No eye pain, visual disturbances, or discharge  ENMT:  No difficulty hearing, tinnitus, vertigo; No sinus or throat pain  NECK: No pain or stiffness  RESPIRATORY: No cough, wheezing, chills or hemoptysis; No Shortness of Breath  CARDIOVASCULAR: See above  GASTROINTESTINAL: No abdominal or epigastric pain. No nausea, vomiting, or hematemesis; No diarrhea or constipation. No melena or hematochezia.  GENITOURINARY: No dysuria, frequency, hematuria, or incontinence  NEUROLOGICAL: No headaches, memory loss, loss of strength, numbness, or tremors  SKIN: No itching, burning, rashes, or lesions   	        PHYSICAL EXAM:  T(C): 35.9 (12-18-19 @ 14:18), Max: 35.9 (12-17-19 @ 21:39)  HR: 95 (12-18-19 @ 14:18) (88 - 96)  BP: 115/65 (12-18-19 @ 14:18) (93/58 - 115/65)  RR: 18 (12-18-19 @ 14:18) (16 - 18)  SpO2: 97% (12-18-19 @ 08:04) (95% - 97%)  Wt(kg): --  I&O's Summary      Appearance: Normal	  Psychiatry: A & O x 3, Mood & affect appropriate  HEENT:   Normal oral mucosa, PERRL, EOMI	  Lymphatic: No lymphadenopathy  Cardiovascular: Normal S1 S2,RRR, No JVD, No murmurs  Respiratory: Lungs clear to auscultation	  Gastrointestinal:  Soft, Non-tender, + BS	  Skin: No rashes, No ecchymoses, No cyanosis	  Neurologic: Non-focal  Extremities: Normal range of motion, No clubbing, cyanosis 1 +  Vascular: Peripheral pulses palpable 2+ bilaterally      ECG:  	< from: 12 Lead ECG (12.16.19 @ 19:02) >    Diagnosis Line Normal sinus rhythm with sinus arrhythmia  Pulmonary disease pattern  Incomplete right bundle branch block  Possible Right ventricular hypertrophy  Nonspecific T wave abnormality  Abnormal ECG    Confirmed by MARTA HAYNES MD ([id number]) on 12/17/2019 11:44:57 AM    < end of copied text >      	  LABS:	 	    CARDIAC MARKERS:                                    10.7   5.14  )-----------( 211      ( 18 Dec 2019 07:14 )             32.0     12-18    138  |  100  |  35<H>  ----------------------------<  92  3.9   |  26  |  0.8    Ca    8.7      18 Dec 2019 07:14  Mg     2.0     12-16    TPro  5.7<L>  /  Alb  2.9<L>  /  TBili  0.4  /  DBili  x   /  AST  30  /  ALT  20  /  AlkPhos  219<H>  12-18

## 2019-12-19 NOTE — PROGRESS NOTE ADULT - SUBJECTIVE AND OBJECTIVE BOX
JANICE COURTNEY  64y, Female  Allergy: Gluten (Unknown)  lactose (Unknown)  No Known Drug Allergies    Hospital Day: 3d    Patient seen and examined earlier today. No acute events overnight.    PMH/PSH:  PAST MEDICAL & SURGICAL HISTORY:  Cancer  No significant past surgical history    VITALS:  T(F): 96.7 (12-19-19 @ 05:30), Max: 96.7 (12-18-19 @ 14:18)  HR: 89 (12-19-19 @ 05:30)  BP: 98/64 (12-19-19 @ 05:30) (89/54 - 115/65)  RR: 18 (12-19-19 @ 05:30)  SpO2: --    TESTS & MEASUREMENTS:  Weight (Kg): 84.3 (12-17-19 @ 13:02)  BMI (kg/m2): 25.2 (12-18)                        10.8   5.85  )-----------( 214      ( 19 Dec 2019 07:08 )             32.3     12-19    137  |  99  |  31<H>  ----------------------------<  99  4.3   |  25  |  0.9    Ca    8.7      19 Dec 2019 07:08    TPro  5.7<L>  /  Alb  2.9<L>  /  TBili  0.4  /  DBili  x   /  AST  30  /  ALT  20  /  AlkPhos  219<H>  12-18    LIVER FUNCTIONS - ( 18 Dec 2019 07:14 )  Alb: 2.9 g/dL / Pro: 5.7 g/dL / ALK PHOS: 219 U/L / ALT: 20 U/L / AST: 30 U/L / GGT: x           RECENT DIAGNOSTIC ORDERS:  12 Lead ECG: Repeat From: 18-Dec-2019 14:58 To: 20-Dec-2019 00:00, Every 1 day(s)  Provider's Contact #: [phone] (12-18-19 @ 14:59)    MEDICATIONS:  MEDICATIONS  (STANDING):  furosemide   Injectable 40 milliGRAM(s) IV Push daily    REVIEW OF SYSTEMS:  All other review of systems is negative unless indicated above.     PHYSICAL EXAM:  GENERAL: NAD  HEENT: No Swelling  CHEST/LUNG: Good air entry, No wheezing  HEART: RRR, No murmurs  ABDOMEN: Soft, Bowel sounds present  EXTREMITIES:  No clubbing, 2+ pitting edema bilateral legs

## 2019-12-19 NOTE — PROGRESS NOTE ADULT - ASSESSMENT
65yo female with history of metastatic cancer (carcinoid?) presents to the ER due to shortness of breath and dizziness with exertion.     1. Dyspnea on Exertion likely secondary to severe TR: Pt aware of her severe TR and has been followed by cardio outpatient. Continue IV Lasix 40mg Daily. Monitor I/Os. Cardio consult appreciated  2. Neuroendocrine CA: Follows [facility], not on active treatment]    GI/DVT PPX    #Progress Note Handoff  Pending (specify): IV diuresis  Family discussion: d/w pt regarding IV diuresis  Disposition: Home

## 2019-12-20 NOTE — PROGRESS NOTE ADULT - SUBJECTIVE AND OBJECTIVE BOX
JANICE COURTNEY  64y, Female  Allergy: Gluten (Unknown)  lactose (Unknown)  No Known Drug Allergies    Hospital Day: 4d    Patient seen and examined earlier today. No acute events overnight.    PMH/PSH:  PAST MEDICAL & SURGICAL HISTORY:  Cancer  No significant past surgical history    VITALS:  T(F): 96 (12-20-19 @ 05:42), Max: 97 (12-19-19 @ 21:36)  HR: 92 (12-20-19 @ 05:42)  BP: 91/63 (12-20-19 @ 05:42) (91/63 - 101/69)  RR: 16 (12-20-19 @ 05:42)  SpO2: --    TESTS & MEASUREMENTS:  Weight (Kg):   BMI (kg/m2): 25.2 (12-18)                        11.1   5.44  )-----------( 231      ( 20 Dec 2019 08:02 )             31.8     12-20    135  |  98  |  27<H>  ----------------------------<  102<H>  4.1   |  26  |  0.8    Ca    8.5      20 Dec 2019 08:02  Mg     1.8     12-20    RECENT DIAGNOSTIC ORDERS:  12 Lead ECG:   Provider's Contact #: [phone] (12-20-19 @ 00:01)  Xray Chest 1 View- PORTABLE-Routine: Routine   Indication: Shortness of Breath  Transport: Portable (12-20-19 @ 09:45)    MEDICATIONS:  MEDICATIONS  (STANDING):  furosemide   Injectable 40 milliGRAM(s) IV Push daily  furosemide   Injectable 40 milliGRAM(s) IV Push once    MEDICATIONS  (PRN):      HOME MEDICATIONS:      REVIEW OF SYSTEMS:  All other review of systems is negative unless indicated above.     PHYSICAL EXAM:  GENERAL: NAD  HEENT: No Swelling  CHEST/LUNG: Good air entry, No wheezing  HEART: RRR, No murmurs  ABDOMEN: Soft, Bowel sounds present  EXTREMITIES:  No clubbing JANICE COURTNEY  64y, Female  Allergy: Gluten (Unknown)  lactose (Unknown)  No Known Drug Allergies    Hospital Day: 4d    Patient seen and examined earlier today. No acute events overnight.    PMH/PSH:  PAST MEDICAL & SURGICAL HISTORY:  Cancer  No significant past surgical history    VITALS:  T(F): 96 (12-20-19 @ 05:42), Max: 97 (12-19-19 @ 21:36)  HR: 92 (12-20-19 @ 05:42)  BP: 91/63 (12-20-19 @ 05:42) (91/63 - 101/69)  RR: 16 (12-20-19 @ 05:42)  SpO2: --    TESTS & MEASUREMENTS:  Weight (Kg):   BMI (kg/m2): 25.2 (12-18)                        11.1   5.44  )-----------( 231      ( 20 Dec 2019 08:02 )             31.8     12-20    135  |  98  |  27<H>  ----------------------------<  102<H>  4.1   |  26  |  0.8    Ca    8.5      20 Dec 2019 08:02  Mg     1.8     12-20    RECENT DIAGNOSTIC ORDERS:  12 Lead ECG:   Provider's Contact #: [phone] (12-20-19 @ 00:01)  Xray Chest 1 View- PORTABLE-Routine: Routine   Indication: Shortness of Breath  Transport: Portable (12-20-19 @ 09:45)    MEDICATIONS:  MEDICATIONS  (STANDING):  furosemide   Injectable 40 milliGRAM(s) IV Push daily  furosemide   Injectable 40 milliGRAM(s) IV Push once    MEDICATIONS  (PRN):      HOME MEDICATIONS:      REVIEW OF SYSTEMS:  All other review of systems is negative unless indicated above.     PHYSICAL EXAM:  GENERAL: NAD  HEENT: No Swelling  CHEST/LUNG: Good air entry, No wheezing  HEART: RRR, No murmurs  ABDOMEN: Soft, Bowel sounds present  EXTREMITIES:  No clubbing, b/l LE 1+ pitting edema

## 2019-12-20 NOTE — PHYSICAL THERAPY INITIAL EVALUATION ADULT - GENERAL OBSERVATIONS, REHAB EVAL
8:55 - 9:20. Chart reviewed. Patient available to be seen for physical therapy, confirmed with nurse. Patient encountered semi-reclined in bed. Denies pain at rest, c/o BLE "heaviness," agreeable for PT evaluation.

## 2019-12-20 NOTE — PHYSICAL THERAPY INITIAL EVALUATION ADULT - PERTINENT HX OF CURRENT PROBLEM, REHAB EVAL
8:55 - 9:20. Chart reviewed. Patient available to be seen for physical therapy, confirmed with nurse. Patient encountered semi-reclined in bed. Denies pain at rest, c/o BLE "heaviness," agreeable for PT evaluation. Admitted for anasarca, dyspnea on exertion

## 2019-12-20 NOTE — PHYSICAL THERAPY INITIAL EVALUATION ADULT - GAIT DEVIATIONS NOTED, PT EVAL
decreased step length/decreased jose roberto/increased time in double stance/incomplete foot clearance, decreased heel strike / push off/decreased weight-shifting ability

## 2019-12-20 NOTE — PROGRESS NOTE ADULT - ASSESSMENT
65yo female with history of metastatic cancer (carcinoid?) presents to the ER due to shortness of breath and dizziness with exertion.     1. Dyspnea on Exertion likely secondary to severe TR: Pt aware of her severe TR and has been followed by cardio outpatient. Continue IV Lasix 40mg Daily. Monitor I/Os. Cardio consult appreciated. Will obtain CXR today. PT/OT consult placed  2. Neuroendocrine CA: Follows [facility], not on active treatment]    GI/DVT PPX    #Progress Note Handoff  Pending (specify): IV diuresis  Family discussion: d/w pt regarding IV diuresis  Disposition: Home 65yo female with history of metastatic cancer (carcinoid?) presents to the ER due to shortness of breath and dizziness with exertion.     1. Dyspnea on Exertion likely secondary to severe TR, CHF ruled out: Pt aware of her severe TR and has been followed by cardio outpatient. Continue IV Lasix 40mg Daily. Monitor I/Os. Cardio consult appreciated. Will obtain CXR today. PT/OT consult placed  2. Neuroendocrine CA: Follows [facility], not on active treatment]    GI/DVT PPX    #Progress Note Handoff  Pending (specify): IV diuresis  Family discussion: d/w pt regarding IV diuresis  Disposition: Home

## 2019-12-21 NOTE — PROGRESS NOTE ADULT - SUBJECTIVE AND OBJECTIVE BOX
JANICE COURTNEY  64y, Female  Allergy: Gluten (Unknown)  lactose (Unknown)  No Known Drug Allergies    Hospital Day: 5d    Patient seen and examined earlier today. No acute events overnight.    PMH/PSH:  PAST MEDICAL & SURGICAL HISTORY:  Cancer  No significant past surgical history    VITALS:  T(F): 96.2 (12-21-19 @ 05:42), Max: 97.8 (12-20-19 @ 13:00)  HR: 97 (12-21-19 @ 05:42)  BP: 93/65 (12-21-19 @ 05:42) (93/65 - 104/58)  RR: 16 (12-21-19 @ 05:42)  SpO2: --    TESTS & MEASUREMENTS:  Weight (Kg):   BMI (kg/m2): 25.2 (12-18)    12-20-19 @ 07:01  -  12-21-19 @ 07:00  --------------------------------------------------------  IN: 0 mL / OUT: 502 mL / NET: -502 mL                       10.2   5.89  )-----------( 217      ( 21 Dec 2019 08:04 )             30.0     12-21    139  |  101  |  27<H>  ----------------------------<  121<H>  4.0   |  26  |  0.9    Ca    8.3<L>      21 Dec 2019 08:04  Mg     1.8     12-20    MEDICATIONS:  MEDICATIONS  (STANDING):  furosemide   Injectable 40 milliGRAM(s) IV Push daily    MEDICATIONS  (PRN):  hydrocortisone hemorrhoidal Suppository 1 Suppository(s) Rectal two times a day PRN Hemorrhoid pain    HOME MEDICATIONS:    REVIEW OF SYSTEMS:  All other review of systems is negative unless indicated above.     PHYSICAL EXAM:  GENERAL: NAD  HEENT: No Swelling  CHEST/LUNG: Good air entry, No wheezing  HEART: RRR, No murmurs  ABDOMEN: Soft, Bowel sounds present  EXTREMITIES:  No clubbing, 2+ pitting edema

## 2019-12-21 NOTE — PROGRESS NOTE ADULT - ASSESSMENT
63yo female with history of metastatic cancer (carcinoid?) presents to the ER due to shortness of breath and dizziness with exertion.     1. Dyspnea on Exertion likely secondary to severe TR, CHF ruled out: Pt aware of her severe TR and has been followed by cardio outpatient. Continue IV Lasix 40mg Daily. Monitor I/Os. Cardio consult appreciated. PT/OT  2. Neuroendocrine CA: Follows [facility], not on active treatment]    GI/DVT PPX    #Progress Note Handoff  Pending (specify): IV diuresis  Family discussion: d/w pt regarding IV diuresis  Disposition: Home

## 2019-12-22 NOTE — PROGRESS NOTE ADULT - ASSESSMENT
63yo female with history of metastatic cancer (carcinoid?) presents to the ER due to shortness of breath and dizziness with exertion.     1. Dyspnea on Exertion likely secondary to severe TR, CHF ruled out: Pt aware of her severe TR and has been followed by cardio outpatient. Continue IV Lasix 40mg Daily. Monitor I/Os. Cardio consult appreciated. PT/OT. Will need safe dispo, pt is very deconditioned  2. Neuroendocrine CA: Follows [facility], not on active treatment]    GI/DVT PPX    #Progress Note Handoff  Pending (specify): IV diuresis  Family discussion: d/w pt regarding IV diuresis  Disposition: Home

## 2019-12-22 NOTE — PROGRESS NOTE ADULT - SUBJECTIVE AND OBJECTIVE BOX
JANICE COURTNEY  64y, Female  Allergy: Gluten (Unknown)  lactose (Unknown)  No Known Drug Allergies    Hospital Day: 6d    Patient seen and examined earlier today. No acute events overnight. States that generalized swelling is improveming.    PMH/PSH:  PAST MEDICAL & SURGICAL HISTORY:  Cancer  No significant past surgical history    VITALS:  T(F): 96.9 (12-22-19 @ 05:00), Max: 97.3 (12-21-19 @ 21:11)  HR: 96 (12-22-19 @ 05:00)  BP: 99/65 (12-22-19 @ 05:00) (99/65 - 115/59)  RR: 16 (12-22-19 @ 05:00)  SpO2: --    TESTS & MEASUREMENTS:  Weight (Kg):   BMI (kg/m2): 25.2 (12-18)    12-20-19 @ 07:01  -  12-21-19 @ 07:00  --------------------------------------------------------  IN: 0 mL / OUT: 502 mL / NET: -502 mL    12-21-19 @ 07:01  -  12-22-19 @ 07:00  --------------------------------------------------------  IN: 0 mL / OUT: 200 mL / NET: -200 mL                        10.6   6.19  )-----------( 217      ( 22 Dec 2019 09:55 )             31.3       12-21    139  |  101  |  27<H>  ----------------------------<  121<H>  4.0   |  26  |  0.9    Ca    8.3<L>      21 Dec 2019 08:04    MEDICATIONS:  MEDICATIONS  (STANDING):  furosemide   Injectable 40 milliGRAM(s) IV Push daily    MEDICATIONS  (PRN):  hydrocortisone hemorrhoidal Suppository 1 Suppository(s) Rectal two times a day PRN Hemorrhoid pain      HOME MEDICATIONS:      REVIEW OF SYSTEMS:  All other review of systems is negative unless indicated above.     PHYSICAL EXAM:  GENERAL: NAD  HEENT: No Swelling  CHEST/LUNG: Good air entry, No wheezing  HEART: RRR, No murmurs  ABDOMEN: Soft, Bowel sounds present  EXTREMITIES:  No clubbing, 2+ pitting edema bilaterally

## 2019-12-23 NOTE — PROGRESS NOTE ADULT - ASSESSMENT
63yo female with history of metastatic cancer (carcinoid?) presents to the ER due to shortness of breath and dizziness with exertion.     1. Dyspnea on Exertion likely secondary to severe TR, CHF ruled out: Pt aware of her severe TR and has been followed by cardio outpatient. Switching IV lasix to oral. Monitor I/Os. Cardio consult appreciated. PT/OT. Will need safe dispo, pt is very deconditioned. Pt to follow-up with outpatinet cardio  2. Neuroendocrine CA: Follows [facility], not on active treatment]    GI/DVT PPX    #Progress Note Handoff  Pending (specify): DC planning  Family discussion: d/w pt regarding DC planning  Disposition: Home

## 2019-12-24 NOTE — DISCHARGE NOTE NURSING/CASE MANAGEMENT/SOCIAL WORK - PATIENT PORTAL LINK FT
You can access the FollowMyHealth Patient Portal offered by [facility] by registering at the following website: [email or web address]. By joining [email or web address]’s FollowMyHealth portal, you will also be able to view your health information using other applications (apps) compatible with our system.

## 2019-12-24 NOTE — PROGRESS NOTE ADULT - ASSESSMENT
65yo female with history of metastatic cancer (carcinoid?) presents to the ER due to shortness of breath and dizziness with exertion.     1. Dyspnea on Exertion likely secondary to severe TR, CHF ruled out: Pt aware of her severe TR and has been followed by cardio outpatient. Cont lasix 40 PO daily. Monitor I/Os. Cardio consult appreciated. PT/OT. Pt to be discharged with PO lasix 40 daily (previously on every other day, instructed patient to monitor daily weight and increase to BID if weight is going up. Advised patient to have close follow-up with cardiology)  2. Neuroendocrine CA: Follows [facility], not on active treatment]    GI/DVT PPX    #Progress Note Handoff  Pending (specify): DC planning  Family discussion: d/w pt regarding DC planning  Disposition: Home

## 2019-12-24 NOTE — DISCHARGE NOTE PROVIDER - NSDCCPCAREPLAN_GEN_ALL_CORE_FT
PRINCIPAL DISCHARGE DIAGNOSIS  Diagnosis: Anasarca  Assessment and Plan of Treatment: Due to fluid overload. Take lasix 40mg once a day and monitor your weight daily. If your weight is increasing due to water retention, it is okay to take lasix 40mg twice a day until your weight goes down. Have close follow up with your cardiologist.      SECONDARY DISCHARGE DIAGNOSES  Diagnosis: Dyspnea on exertion  Assessment and Plan of Treatment: Due to fluid overload. Take lasix 40mg once a day and monitor your weight daily. If your weight is increasing due to water retention, it is okay to take lasix 40mg twice a day until your weight goes down. Have close follow up with your cardiologist.

## 2019-12-24 NOTE — PROGRESS NOTE ADULT - SUBJECTIVE AND OBJECTIVE BOX
JANICE COURTNEY  64y, Female  Allergy: Gluten (Unknown)  lactose (Unknown)  No Known Drug Allergies    Hospital Day: 8d    Patient seen and examined earlier today. No acute events overnight.    PMH/PSH:  PAST MEDICAL & SURGICAL HISTORY:  Cancer  No significant past surgical history    VITALS:  T(F): 97.1 (12-24-19 @ 05:57), Max: 97.1 (12-24-19 @ 05:57)  HR: 100 (12-24-19 @ 09:21)  BP: 99/70 (12-24-19 @ 09:21) (93/61 - 102/67)  RR: 16 (12-24-19 @ 05:57)  SpO2: --    TESTS & MEASUREMENTS:  Weight (Kg):              10.0   6.62  )-----------( 205      ( 23 Dec 2019 07:20 )             29.2       12-23    136  |  100  |  25<H>  ----------------------------<  95  4.0   |  27  |  0.7    Ca    8.3<L>      23 Dec 2019 07:20    MEDICATIONS:  MEDICATIONS  (STANDING):  chlorhexidine 4% Liquid 1 Application(s) Topical <User Schedule>  enoxaparin Injectable 40 milliGRAM(s) SubCutaneous daily  furosemide    Tablet 40 milliGRAM(s) Oral daily    MEDICATIONS  (PRN):  hydrocortisone hemorrhoidal Suppository 1 Suppository(s) Rectal two times a day PRN Hemorrhoid pain      HOME MEDICATIONS:      REVIEW OF SYSTEMS:  All other review of systems is negative unless indicated above.     PHYSICAL EXAM:  GENERAL: NAD  HEENT: No Swelling  CHEST/LUNG: Good air entry, No wheezing  HEART: RRR, No murmurs  ABDOMEN: Soft, Bowel sounds present  EXTREMITIES:  No clubbing

## 2019-12-24 NOTE — DISCHARGE NOTE PROVIDER - HOSPITAL COURSE
Patient c/o feeling weak, increasing SOB and CASPER over past few weeks, Getting worse past few days. H/o metastatic CA. Leg swelling, cough, no fever. C/o decrease in appetite. Pt found to be fluid overloaded due to severe tricuspid regurgitation, which pt is aware about. Patient was diuresed with IV lasix throughout admission. Now medically stable for discharge.

## 2020-01-01 ENCOUNTER — INPATIENT (INPATIENT)
Facility: HOSPITAL | Age: 65
LOS: 12 days | End: 2020-02-19
Attending: INTERNAL MEDICINE | Admitting: INTERNAL MEDICINE
Payer: COMMERCIAL

## 2020-01-01 VITALS
SYSTOLIC BLOOD PRESSURE: 96 MMHG | HEART RATE: 86 BPM | OXYGEN SATURATION: 100 % | RESPIRATION RATE: 18 BRPM | DIASTOLIC BLOOD PRESSURE: 53 MMHG | TEMPERATURE: 98 F | WEIGHT: 130.07 LBS

## 2020-01-01 VITALS — RESPIRATION RATE: 20 BRPM | HEART RATE: 88 BPM

## 2020-01-01 DIAGNOSIS — I50.810 RIGHT HEART FAILURE, UNSPECIFIED: ICD-10-CM

## 2020-01-01 DIAGNOSIS — I27.20 PULMONARY HYPERTENSION, UNSPECIFIED: ICD-10-CM

## 2020-01-01 DIAGNOSIS — D3A.8 OTHER BENIGN NEUROENDOCRINE TUMORS: ICD-10-CM

## 2020-01-01 DIAGNOSIS — R57.0 CARDIOGENIC SHOCK: ICD-10-CM

## 2020-01-01 DIAGNOSIS — Z90.49 ACQUIRED ABSENCE OF OTHER SPECIFIED PARTS OF DIGESTIVE TRACT: Chronic | ICD-10-CM

## 2020-01-01 LAB
ALBUMIN SERPL ELPH-MCNC: 2 G/DL — LOW (ref 3.5–5.2)
ALBUMIN SERPL ELPH-MCNC: 2 G/DL — LOW (ref 3.5–5.2)
ALBUMIN SERPL ELPH-MCNC: 2.4 G/DL — LOW (ref 3.5–5.2)
ALBUMIN SERPL ELPH-MCNC: 2.5 G/DL — LOW (ref 3.5–5.2)
ALBUMIN SERPL ELPH-MCNC: 2.5 G/DL — LOW (ref 3.5–5.2)
ALBUMIN SERPL ELPH-MCNC: 2.6 G/DL — LOW (ref 3.5–5.2)
ALBUMIN SERPL ELPH-MCNC: 2.7 G/DL — LOW (ref 3.5–5.2)
ALBUMIN SERPL ELPH-MCNC: 2.8 G/DL — LOW (ref 3.5–5.2)
ALP SERPL-CCNC: 250 U/L — HIGH (ref 30–115)
ALP SERPL-CCNC: 252 U/L — HIGH (ref 30–115)
ALP SERPL-CCNC: 262 U/L — HIGH (ref 30–115)
ALP SERPL-CCNC: 264 U/L — HIGH (ref 30–115)
ALP SERPL-CCNC: 264 U/L — HIGH (ref 30–115)
ALP SERPL-CCNC: 274 U/L — HIGH (ref 30–115)
ALP SERPL-CCNC: 283 U/L — HIGH (ref 30–115)
ALP SERPL-CCNC: 283 U/L — HIGH (ref 30–115)
ALP SERPL-CCNC: 294 U/L — HIGH (ref 30–115)
ALP SERPL-CCNC: 302 U/L — HIGH (ref 30–115)
ALP SERPL-CCNC: 309 U/L — HIGH (ref 30–115)
ALP SERPL-CCNC: 319 U/L — HIGH (ref 30–115)
ALT FLD-CCNC: 21 U/L — SIGNIFICANT CHANGE UP (ref 0–41)
ALT FLD-CCNC: 21 U/L — SIGNIFICANT CHANGE UP (ref 0–41)
ALT FLD-CCNC: 22 U/L — SIGNIFICANT CHANGE UP (ref 0–41)
ALT FLD-CCNC: 23 U/L — SIGNIFICANT CHANGE UP (ref 0–41)
ALT FLD-CCNC: 24 U/L — SIGNIFICANT CHANGE UP (ref 0–41)
ALT FLD-CCNC: 26 U/L — SIGNIFICANT CHANGE UP (ref 0–41)
ALT FLD-CCNC: 26 U/L — SIGNIFICANT CHANGE UP (ref 0–41)
ALT FLD-CCNC: 27 U/L — SIGNIFICANT CHANGE UP (ref 0–41)
ALT FLD-CCNC: 27 U/L — SIGNIFICANT CHANGE UP (ref 0–41)
ALT FLD-CCNC: 32 U/L — SIGNIFICANT CHANGE UP (ref 0–41)
ALT FLD-CCNC: 628 U/L — HIGH (ref 0–41)
ALT FLD-CCNC: 691 U/L — HIGH (ref 0–41)
ANION GAP SERPL CALC-SCNC: 12 MMOL/L — SIGNIFICANT CHANGE UP (ref 7–14)
ANION GAP SERPL CALC-SCNC: 12 MMOL/L — SIGNIFICANT CHANGE UP (ref 7–14)
ANION GAP SERPL CALC-SCNC: 14 MMOL/L — SIGNIFICANT CHANGE UP (ref 7–14)
ANION GAP SERPL CALC-SCNC: 15 MMOL/L — HIGH (ref 7–14)
ANION GAP SERPL CALC-SCNC: 16 MMOL/L — HIGH (ref 7–14)
ANION GAP SERPL CALC-SCNC: 17 MMOL/L — HIGH (ref 7–14)
ANION GAP SERPL CALC-SCNC: 17 MMOL/L — HIGH (ref 7–14)
ANION GAP SERPL CALC-SCNC: 20 MMOL/L — HIGH (ref 7–14)
ANION GAP SERPL CALC-SCNC: 24 MMOL/L — HIGH (ref 7–14)
ANISOCYTOSIS BLD QL: SIGNIFICANT CHANGE UP
APPEARANCE UR: ABNORMAL
APPEARANCE UR: CLEAR — SIGNIFICANT CHANGE UP
APTT BLD: 22.6 SEC — CRITICAL LOW (ref 27–39.2)
APTT BLD: 32.8 SEC — SIGNIFICANT CHANGE UP (ref 27–39.2)
APTT BLD: 35 SEC — SIGNIFICANT CHANGE UP (ref 27–39.2)
APTT BLD: 36.9 SEC — SIGNIFICANT CHANGE UP (ref 27–39.2)
AST SERPL-CCNC: 2238 U/L — HIGH (ref 0–41)
AST SERPL-CCNC: 30 U/L — SIGNIFICANT CHANGE UP (ref 0–41)
AST SERPL-CCNC: 30 U/L — SIGNIFICANT CHANGE UP (ref 0–41)
AST SERPL-CCNC: 36 U/L — SIGNIFICANT CHANGE UP (ref 0–41)
AST SERPL-CCNC: 36 U/L — SIGNIFICANT CHANGE UP (ref 0–41)
AST SERPL-CCNC: 38 U/L — SIGNIFICANT CHANGE UP (ref 0–41)
AST SERPL-CCNC: 42 U/L — HIGH (ref 0–41)
AST SERPL-CCNC: 44 U/L — HIGH (ref 0–41)
AST SERPL-CCNC: 46 U/L — HIGH (ref 0–41)
AST SERPL-CCNC: 51 U/L — HIGH (ref 0–41)
AST SERPL-CCNC: 51 U/L — HIGH (ref 0–41)
AST SERPL-CCNC: >700 U/L — HIGH (ref 0–41)
BACTERIA # UR AUTO: ABNORMAL
BACTERIA # UR AUTO: NEGATIVE — SIGNIFICANT CHANGE UP
BASE EXCESS BLDA CALC-SCNC: -10 MMOL/L — LOW (ref -2–2)
BASE EXCESS BLDA CALC-SCNC: -10.1 MMOL/L — LOW (ref -2–2)
BASE EXCESS BLDA CALC-SCNC: -10.3 MMOL/L — LOW (ref -2–2)
BASE EXCESS BLDA CALC-SCNC: -10.8 MMOL/L — LOW (ref -2–2)
BASE EXCESS BLDA CALC-SCNC: -11.1 MMOL/L — LOW (ref -2–2)
BASE EXCESS BLDA CALC-SCNC: -11.2 MMOL/L — LOW (ref -2–2)
BASE EXCESS BLDA CALC-SCNC: -15.8 MMOL/L — LOW (ref -2–2)
BASE EXCESS BLDA CALC-SCNC: -3 MMOL/L — LOW (ref -2–2)
BASE EXCESS BLDA CALC-SCNC: -4.9 MMOL/L — LOW (ref -2–2)
BASE EXCESS BLDA CALC-SCNC: -5.5 MMOL/L — LOW (ref -2–2)
BASE EXCESS BLDA CALC-SCNC: -5.9 MMOL/L — LOW (ref -2–2)
BASOPHILS # BLD AUTO: 0 K/UL — SIGNIFICANT CHANGE UP (ref 0–0.2)
BASOPHILS # BLD AUTO: 0.01 K/UL — SIGNIFICANT CHANGE UP (ref 0–0.2)
BASOPHILS # BLD AUTO: 0.02 K/UL — SIGNIFICANT CHANGE UP (ref 0–0.2)
BASOPHILS # BLD AUTO: 0.02 K/UL — SIGNIFICANT CHANGE UP (ref 0–0.2)
BASOPHILS # BLD AUTO: 0.05 K/UL — SIGNIFICANT CHANGE UP (ref 0–0.2)
BASOPHILS # BLD AUTO: 0.07 K/UL — SIGNIFICANT CHANGE UP (ref 0–0.2)
BASOPHILS # BLD AUTO: 0.08 K/UL — SIGNIFICANT CHANGE UP (ref 0–0.2)
BASOPHILS NFR BLD AUTO: 0 % — SIGNIFICANT CHANGE UP (ref 0–1)
BASOPHILS NFR BLD AUTO: 0.2 % — SIGNIFICANT CHANGE UP (ref 0–1)
BASOPHILS NFR BLD AUTO: 0.3 % — SIGNIFICANT CHANGE UP (ref 0–1)
BASOPHILS NFR BLD AUTO: 0.3 % — SIGNIFICANT CHANGE UP (ref 0–1)
BASOPHILS NFR BLD AUTO: 0.4 % — SIGNIFICANT CHANGE UP (ref 0–1)
BASOPHILS NFR BLD AUTO: 0.6 % — SIGNIFICANT CHANGE UP (ref 0–1)
BASOPHILS NFR BLD AUTO: 0.7 % — SIGNIFICANT CHANGE UP (ref 0–1)
BILIRUB SERPL-MCNC: 0.3 MG/DL — SIGNIFICANT CHANGE UP (ref 0.2–1.2)
BILIRUB SERPL-MCNC: 0.4 MG/DL — SIGNIFICANT CHANGE UP (ref 0.2–1.2)
BILIRUB SERPL-MCNC: 0.5 MG/DL — SIGNIFICANT CHANGE UP (ref 0.2–1.2)
BILIRUB SERPL-MCNC: 0.5 MG/DL — SIGNIFICANT CHANGE UP (ref 0.2–1.2)
BILIRUB SERPL-MCNC: 0.8 MG/DL — SIGNIFICANT CHANGE UP (ref 0.2–1.2)
BILIRUB SERPL-MCNC: 0.9 MG/DL — SIGNIFICANT CHANGE UP (ref 0.2–1.2)
BILIRUB SERPL-MCNC: 1.3 MG/DL — HIGH (ref 0.2–1.2)
BILIRUB UR-MCNC: ABNORMAL
BILIRUB UR-MCNC: ABNORMAL
BLD GP AB SCN SERPL QL: SIGNIFICANT CHANGE UP
BUN SERPL-MCNC: 32 MG/DL — HIGH (ref 10–20)
BUN SERPL-MCNC: 33 MG/DL — HIGH (ref 10–20)
BUN SERPL-MCNC: 33 MG/DL — HIGH (ref 10–20)
BUN SERPL-MCNC: 34 MG/DL — HIGH (ref 10–20)
BUN SERPL-MCNC: 35 MG/DL — HIGH (ref 10–20)
BUN SERPL-MCNC: 37 MG/DL — HIGH (ref 10–20)
BUN SERPL-MCNC: 43 MG/DL — HIGH (ref 10–20)
BUN SERPL-MCNC: 51 MG/DL — HIGH (ref 10–20)
BUN SERPL-MCNC: 53 MG/DL — HIGH (ref 10–20)
BUN SERPL-MCNC: 55 MG/DL — HIGH (ref 10–20)
BUN SERPL-MCNC: 55 MG/DL — HIGH (ref 10–20)
BUN SERPL-MCNC: 56 MG/DL — HIGH (ref 10–20)
BUN SERPL-MCNC: 63 MG/DL — CRITICAL HIGH (ref 10–20)
BUN SERPL-MCNC: 65 MG/DL — CRITICAL HIGH (ref 10–20)
BUN SERPL-MCNC: 69 MG/DL — CRITICAL HIGH (ref 10–20)
C DIFF BY PCR RESULT: NEGATIVE — SIGNIFICANT CHANGE UP
C DIFF TOX GENS STL QL NAA+PROBE: SIGNIFICANT CHANGE UP
CALCIUM SERPL-MCNC: 6.7 MG/DL — LOW (ref 8.5–10.1)
CALCIUM SERPL-MCNC: 7.2 MG/DL — LOW (ref 8.5–10.1)
CALCIUM SERPL-MCNC: 7.9 MG/DL — LOW (ref 8.5–10.1)
CALCIUM SERPL-MCNC: 7.9 MG/DL — LOW (ref 8.5–10.1)
CALCIUM SERPL-MCNC: 8.1 MG/DL — LOW (ref 8.5–10.1)
CALCIUM SERPL-MCNC: 8.2 MG/DL — LOW (ref 8.5–10.1)
CALCIUM SERPL-MCNC: 8.3 MG/DL — LOW (ref 8.5–10.1)
CALCIUM SERPL-MCNC: 8.4 MG/DL — LOW (ref 8.5–10.1)
CALCIUM SERPL-MCNC: 8.6 MG/DL — SIGNIFICANT CHANGE UP (ref 8.5–10.1)
CHLORIDE SERPL-SCNC: 100 MMOL/L — SIGNIFICANT CHANGE UP (ref 98–110)
CHLORIDE SERPL-SCNC: 100 MMOL/L — SIGNIFICANT CHANGE UP (ref 98–110)
CHLORIDE SERPL-SCNC: 101 MMOL/L — SIGNIFICANT CHANGE UP (ref 98–110)
CHLORIDE SERPL-SCNC: 103 MMOL/L — SIGNIFICANT CHANGE UP (ref 98–110)
CHLORIDE SERPL-SCNC: 103 MMOL/L — SIGNIFICANT CHANGE UP (ref 98–110)
CHLORIDE SERPL-SCNC: 96 MMOL/L — LOW (ref 98–110)
CHLORIDE SERPL-SCNC: 97 MMOL/L — LOW (ref 98–110)
CHLORIDE SERPL-SCNC: 98 MMOL/L — SIGNIFICANT CHANGE UP (ref 98–110)
CHLORIDE SERPL-SCNC: 99 MMOL/L — SIGNIFICANT CHANGE UP (ref 98–110)
CHLORIDE SERPL-SCNC: 99 MMOL/L — SIGNIFICANT CHANGE UP (ref 98–110)
CK MB CFR SERPL CALC: 2.2 NG/ML — SIGNIFICANT CHANGE UP (ref 0.6–6.3)
CK SERPL-CCNC: 209 U/L — SIGNIFICANT CHANGE UP (ref 0–225)
CO2 SERPL-SCNC: 14 MMOL/L — LOW (ref 17–32)
CO2 SERPL-SCNC: 15 MMOL/L — LOW (ref 17–32)
CO2 SERPL-SCNC: 17 MMOL/L — SIGNIFICANT CHANGE UP (ref 17–32)
CO2 SERPL-SCNC: 18 MMOL/L — SIGNIFICANT CHANGE UP (ref 17–32)
CO2 SERPL-SCNC: 19 MMOL/L — SIGNIFICANT CHANGE UP (ref 17–32)
CO2 SERPL-SCNC: 20 MMOL/L — SIGNIFICANT CHANGE UP (ref 17–32)
CO2 SERPL-SCNC: 21 MMOL/L — SIGNIFICANT CHANGE UP (ref 17–32)
CO2 SERPL-SCNC: 22 MMOL/L — SIGNIFICANT CHANGE UP (ref 17–32)
CO2 SERPL-SCNC: 23 MMOL/L — SIGNIFICANT CHANGE UP (ref 17–32)
COHGB MFR BLDA: 0.1 % — SIGNIFICANT CHANGE UP (ref 0–3)
COHGB MFR BLDA: <1 % — SIGNIFICANT CHANGE UP (ref 0–3)
COHGB MFR BLDA: <1 % — SIGNIFICANT CHANGE UP (ref 0–3)
COLOR SPEC: YELLOW — SIGNIFICANT CHANGE UP
COLOR SPEC: YELLOW — SIGNIFICANT CHANGE UP
CORTIS AM PEAK SERPL-MCNC: 27.9 UG/DL — HIGH (ref 6–18.4)
CREAT SERPL-MCNC: 0.9 MG/DL — SIGNIFICANT CHANGE UP (ref 0.7–1.5)
CREAT SERPL-MCNC: 1 MG/DL — SIGNIFICANT CHANGE UP (ref 0.7–1.5)
CREAT SERPL-MCNC: 1.1 MG/DL — SIGNIFICANT CHANGE UP (ref 0.7–1.5)
CREAT SERPL-MCNC: 1.2 MG/DL — SIGNIFICANT CHANGE UP (ref 0.7–1.5)
CREAT SERPL-MCNC: 1.4 MG/DL — SIGNIFICANT CHANGE UP (ref 0.7–1.5)
CREAT SERPL-MCNC: 1.5 MG/DL — SIGNIFICANT CHANGE UP (ref 0.7–1.5)
CREAT SERPL-MCNC: 1.7 MG/DL — HIGH (ref 0.7–1.5)
CREAT SERPL-MCNC: 1.8 MG/DL — HIGH (ref 0.7–1.5)
CREAT SERPL-MCNC: 2.3 MG/DL — HIGH (ref 0.7–1.5)
CREAT SERPL-MCNC: 2.7 MG/DL — HIGH (ref 0.7–1.5)
CULTURE RESULTS: SIGNIFICANT CHANGE UP
D DIMER BLD IA.RAPID-MCNC: 7473 NG/ML DDU — HIGH (ref 0–230)
DIFF PNL FLD: ABNORMAL
DIFF PNL FLD: SIGNIFICANT CHANGE UP
EOSINOPHIL # BLD AUTO: 0 K/UL — SIGNIFICANT CHANGE UP (ref 0–0.7)
EOSINOPHIL # BLD AUTO: 0.01 K/UL — SIGNIFICANT CHANGE UP (ref 0–0.7)
EOSINOPHIL NFR BLD AUTO: 0 % — SIGNIFICANT CHANGE UP (ref 0–8)
EOSINOPHIL NFR BLD AUTO: 0.1 % — SIGNIFICANT CHANGE UP (ref 0–8)
EOSINOPHIL NFR BLD AUTO: 0.2 % — SIGNIFICANT CHANGE UP (ref 0–8)
EOSINOPHIL NFR BLD AUTO: 0.3 % — SIGNIFICANT CHANGE UP (ref 0–8)
EPI CELLS # UR: 2 /HPF — SIGNIFICANT CHANGE UP (ref 0–5)
EPI CELLS # UR: >27 /HPF — HIGH (ref 0–5)
FIBRINOGEN PPP-MCNC: 581 MG/DL — HIGH (ref 204.4–570.6)
FOLATE SERPL-MCNC: 7 NG/ML — SIGNIFICANT CHANGE UP
GAS PNL BLDA: SIGNIFICANT CHANGE UP
GIANT PLATELETS BLD QL SMEAR: PRESENT — SIGNIFICANT CHANGE UP
GLUCOSE BLDC GLUCOMTR-MCNC: 104 MG/DL — HIGH (ref 70–99)
GLUCOSE BLDC GLUCOMTR-MCNC: 110 MG/DL — HIGH (ref 70–99)
GLUCOSE BLDC GLUCOMTR-MCNC: 113 MG/DL — HIGH (ref 70–99)
GLUCOSE BLDC GLUCOMTR-MCNC: 115 MG/DL — HIGH (ref 70–99)
GLUCOSE BLDC GLUCOMTR-MCNC: 117 MG/DL — HIGH (ref 70–99)
GLUCOSE BLDC GLUCOMTR-MCNC: 123 MG/DL — HIGH (ref 70–99)
GLUCOSE BLDC GLUCOMTR-MCNC: 124 MG/DL — HIGH (ref 70–99)
GLUCOSE BLDC GLUCOMTR-MCNC: 124 MG/DL — HIGH (ref 70–99)
GLUCOSE BLDC GLUCOMTR-MCNC: 125 MG/DL — HIGH (ref 70–99)
GLUCOSE BLDC GLUCOMTR-MCNC: 125 MG/DL — HIGH (ref 70–99)
GLUCOSE BLDC GLUCOMTR-MCNC: 127 MG/DL — HIGH (ref 70–99)
GLUCOSE BLDC GLUCOMTR-MCNC: 131 MG/DL — HIGH (ref 70–99)
GLUCOSE BLDC GLUCOMTR-MCNC: 135 MG/DL — HIGH (ref 70–99)
GLUCOSE BLDC GLUCOMTR-MCNC: 139 MG/DL — HIGH (ref 70–99)
GLUCOSE BLDC GLUCOMTR-MCNC: 143 MG/DL — HIGH (ref 70–99)
GLUCOSE BLDC GLUCOMTR-MCNC: 21 MG/DL — CRITICAL LOW (ref 70–99)
GLUCOSE BLDC GLUCOMTR-MCNC: 47 MG/DL — LOW (ref 70–99)
GLUCOSE BLDC GLUCOMTR-MCNC: 48 MG/DL — LOW (ref 70–99)
GLUCOSE BLDC GLUCOMTR-MCNC: 50 MG/DL — LOW (ref 70–99)
GLUCOSE BLDC GLUCOMTR-MCNC: 91 MG/DL — SIGNIFICANT CHANGE UP (ref 70–99)
GLUCOSE BLDC GLUCOMTR-MCNC: 91 MG/DL — SIGNIFICANT CHANGE UP (ref 70–99)
GLUCOSE BLDC GLUCOMTR-MCNC: 95 MG/DL — SIGNIFICANT CHANGE UP (ref 70–99)
GLUCOSE SERPL-MCNC: 100 MG/DL — HIGH (ref 70–99)
GLUCOSE SERPL-MCNC: 101 MG/DL — HIGH (ref 70–99)
GLUCOSE SERPL-MCNC: 103 MG/DL — HIGH (ref 70–99)
GLUCOSE SERPL-MCNC: 110 MG/DL — HIGH (ref 70–99)
GLUCOSE SERPL-MCNC: 113 MG/DL — HIGH (ref 70–99)
GLUCOSE SERPL-MCNC: 115 MG/DL — HIGH (ref 70–99)
GLUCOSE SERPL-MCNC: 119 MG/DL — HIGH (ref 70–99)
GLUCOSE SERPL-MCNC: 142 MG/DL — HIGH (ref 70–99)
GLUCOSE SERPL-MCNC: 142 MG/DL — HIGH (ref 70–99)
GLUCOSE SERPL-MCNC: 143 MG/DL — HIGH (ref 70–99)
GLUCOSE SERPL-MCNC: 145 MG/DL — HIGH (ref 70–99)
GLUCOSE SERPL-MCNC: 173 MG/DL — HIGH (ref 70–99)
GLUCOSE SERPL-MCNC: 19 MG/DL — CRITICAL LOW (ref 70–99)
GLUCOSE SERPL-MCNC: 87 MG/DL — SIGNIFICANT CHANGE UP (ref 70–99)
GLUCOSE SERPL-MCNC: 89 MG/DL — SIGNIFICANT CHANGE UP (ref 70–99)
GLUCOSE UR QL: NEGATIVE — SIGNIFICANT CHANGE UP
GLUCOSE UR QL: NEGATIVE — SIGNIFICANT CHANGE UP
HAPTOGLOB SERPL-MCNC: <20 MG/DL — LOW (ref 34–200)
HCO3 BLDA-SCNC: 12 MMOL/L — LOW (ref 23–27)
HCO3 BLDA-SCNC: 16 MMOL/L — LOW (ref 23–27)
HCO3 BLDA-SCNC: 17 MMOL/L — LOW (ref 23–27)
HCO3 BLDA-SCNC: 18 MMOL/L — LOW (ref 21–29)
HCO3 BLDA-SCNC: 19 MMOL/L — LOW (ref 23–27)
HCO3 BLDA-SCNC: 20 MMOL/L — LOW (ref 21–29)
HCO3 BLDA-SCNC: 20 MMOL/L — LOW (ref 23–27)
HCO3 BLDA-SCNC: 21 MMOL/L — LOW (ref 23–27)
HCO3 BLDA-SCNC: 21 MMOL/L — SIGNIFICANT CHANGE UP (ref 21–29)
HCT VFR BLD CALC: 28.2 % — LOW (ref 37–47)
HCT VFR BLD CALC: 28.5 % — LOW (ref 37–47)
HCT VFR BLD CALC: 29.1 % — LOW (ref 37–47)
HCT VFR BLD CALC: 29.1 % — LOW (ref 37–47)
HCT VFR BLD CALC: 29.7 % — LOW (ref 37–47)
HCT VFR BLD CALC: 29.8 % — LOW (ref 37–47)
HCT VFR BLD CALC: 30.3 % — LOW (ref 37–47)
HCT VFR BLD CALC: 31.6 % — LOW (ref 37–47)
HCT VFR BLD CALC: 31.8 % — LOW (ref 37–47)
HCT VFR BLD CALC: 31.9 % — LOW (ref 37–47)
HCT VFR BLD CALC: 31.9 % — LOW (ref 37–47)
HCT VFR BLD CALC: 32 % — LOW (ref 37–47)
HCT VFR BLD CALC: 32.1 % — LOW (ref 37–47)
HCT VFR BLD CALC: 32.3 % — LOW (ref 37–47)
HCT VFR BLD CALC: 32.3 % — LOW (ref 37–47)
HCT VFR BLD CALC: 32.7 % — LOW (ref 37–47)
HCT VFR BLD CALC: 33.5 % — LOW (ref 37–47)
HCT VFR BLD CALC: 35.1 % — LOW (ref 37–47)
HEPARIN-PF4 AB RESULT: <0.6 U/ML — SIGNIFICANT CHANGE UP (ref 0–0.9)
HEPARIN-PF4 AB RESULT: <0.6 U/ML — SIGNIFICANT CHANGE UP (ref 0–0.9)
HGB BLD-MCNC: 10.1 G/DL — LOW (ref 12–16)
HGB BLD-MCNC: 10.3 G/DL — LOW (ref 12–16)
HGB BLD-MCNC: 10.3 G/DL — LOW (ref 12–16)
HGB BLD-MCNC: 10.8 G/DL — LOW (ref 12–16)
HGB BLD-MCNC: 10.9 G/DL — LOW (ref 12–16)
HGB BLD-MCNC: 11 G/DL — LOW (ref 12–16)
HGB BLD-MCNC: 11 G/DL — LOW (ref 12–16)
HGB BLD-MCNC: 11.2 G/DL — LOW (ref 12–16)
HGB BLD-MCNC: 11.2 G/DL — LOW (ref 12–16)
HGB BLD-MCNC: 11.4 G/DL — LOW (ref 12–16)
HGB BLD-MCNC: 11.6 G/DL — LOW (ref 12–16)
HGB BLD-MCNC: 12.2 G/DL — SIGNIFICANT CHANGE UP (ref 12–16)
HGB BLD-MCNC: 9.9 G/DL — LOW (ref 12–16)
HGB BLDA-MCNC: 10.5 G/DL — LOW (ref 12–16)
HGB BLDA-MCNC: 9.4 G/DL — LOW (ref 12–16)
HGB BLDA-MCNC: 9.8 G/DL — LOW (ref 12–16)
HOROWITZ INDEX BLDA+IHG-RTO: 60 — SIGNIFICANT CHANGE UP
HOROWITZ INDEX BLDA+IHG-RTO: 80 — SIGNIFICANT CHANGE UP
HOROWITZ INDEX BLDA+IHG-RTO: SIGNIFICANT CHANGE UP
HYALINE CASTS # UR AUTO: 2 /LPF — SIGNIFICANT CHANGE UP (ref 0–7)
HYALINE CASTS # UR AUTO: 7 /LPF — SIGNIFICANT CHANGE UP (ref 0–7)
IMM GRANULOCYTES NFR BLD AUTO: 0.3 % — SIGNIFICANT CHANGE UP (ref 0.1–0.3)
IMM GRANULOCYTES NFR BLD AUTO: 0.4 % — HIGH (ref 0.1–0.3)
IMM GRANULOCYTES NFR BLD AUTO: 0.5 % — HIGH (ref 0.1–0.3)
IMM GRANULOCYTES NFR BLD AUTO: 0.5 % — HIGH (ref 0.1–0.3)
IMM GRANULOCYTES NFR BLD AUTO: 0.6 % — HIGH (ref 0.1–0.3)
IMM GRANULOCYTES NFR BLD AUTO: 1.5 % — HIGH (ref 0.1–0.3)
IMM GRANULOCYTES NFR BLD AUTO: 1.7 % — HIGH (ref 0.1–0.3)
IMM GRANULOCYTES NFR BLD AUTO: 2.5 % — HIGH (ref 0.1–0.3)
INR BLD: 1.02 RATIO — SIGNIFICANT CHANGE UP (ref 0.65–1.3)
INR BLD: 1.17 RATIO — SIGNIFICANT CHANGE UP (ref 0.65–1.3)
INR BLD: 1.18 RATIO — SIGNIFICANT CHANGE UP (ref 0.65–1.3)
INR BLD: 1.64 RATIO — HIGH (ref 0.65–1.3)
KETONES UR-MCNC: NEGATIVE — SIGNIFICANT CHANGE UP
KETONES UR-MCNC: NEGATIVE — SIGNIFICANT CHANGE UP
LEUKOCYTE ESTERASE UR-ACNC: ABNORMAL
LEUKOCYTE ESTERASE UR-ACNC: NEGATIVE — SIGNIFICANT CHANGE UP
LYMPHOCYTES # BLD AUTO: 0.19 K/UL — LOW (ref 1.2–3.4)
LYMPHOCYTES # BLD AUTO: 0.35 K/UL — LOW (ref 1.2–3.4)
LYMPHOCYTES # BLD AUTO: 0.38 K/UL — LOW (ref 1.2–3.4)
LYMPHOCYTES # BLD AUTO: 0.39 K/UL — LOW (ref 1.2–3.4)
LYMPHOCYTES # BLD AUTO: 0.39 K/UL — LOW (ref 1.2–3.4)
LYMPHOCYTES # BLD AUTO: 0.42 K/UL — LOW (ref 1.2–3.4)
LYMPHOCYTES # BLD AUTO: 0.43 K/UL — LOW (ref 1.2–3.4)
LYMPHOCYTES # BLD AUTO: 0.43 K/UL — LOW (ref 1.2–3.4)
LYMPHOCYTES # BLD AUTO: 0.44 K/UL — LOW (ref 1.2–3.4)
LYMPHOCYTES # BLD AUTO: 0.5 K/UL — LOW (ref 1.2–3.4)
LYMPHOCYTES # BLD AUTO: 0.51 K/UL — LOW (ref 1.2–3.4)
LYMPHOCYTES # BLD AUTO: 0.52 K/UL — LOW (ref 1.2–3.4)
LYMPHOCYTES # BLD AUTO: 0.55 K/UL — LOW (ref 1.2–3.4)
LYMPHOCYTES # BLD AUTO: 0.6 K/UL — LOW (ref 1.2–3.4)
LYMPHOCYTES # BLD AUTO: 1.7 % — LOW (ref 20.5–51.1)
LYMPHOCYTES # BLD AUTO: 10 % — LOW (ref 20.5–51.1)
LYMPHOCYTES # BLD AUTO: 10.3 % — LOW (ref 20.5–51.1)
LYMPHOCYTES # BLD AUTO: 11.7 % — LOW (ref 20.5–51.1)
LYMPHOCYTES # BLD AUTO: 13.1 % — LOW (ref 20.5–51.1)
LYMPHOCYTES # BLD AUTO: 13.8 % — LOW (ref 20.5–51.1)
LYMPHOCYTES # BLD AUTO: 14.1 % — LOW (ref 20.5–51.1)
LYMPHOCYTES # BLD AUTO: 3 % — LOW (ref 20.5–51.1)
LYMPHOCYTES # BLD AUTO: 3.1 % — LOW (ref 20.5–51.1)
LYMPHOCYTES # BLD AUTO: 3.5 % — LOW (ref 20.5–51.1)
LYMPHOCYTES # BLD AUTO: 6.4 % — LOW (ref 20.5–51.1)
LYMPHOCYTES # BLD AUTO: 6.6 % — LOW (ref 20.5–51.1)
LYMPHOCYTES # BLD AUTO: 8.7 % — LOW (ref 20.5–51.1)
LYMPHOCYTES # BLD AUTO: 8.7 % — LOW (ref 20.5–51.1)
MACROCYTES BLD QL: SIGNIFICANT CHANGE UP
MAGNESIUM SERPL-MCNC: 1.6 MG/DL — LOW (ref 1.8–2.4)
MAGNESIUM SERPL-MCNC: 1.6 MG/DL — LOW (ref 1.8–2.4)
MAGNESIUM SERPL-MCNC: 1.8 MG/DL — SIGNIFICANT CHANGE UP (ref 1.8–2.4)
MAGNESIUM SERPL-MCNC: 2 MG/DL — SIGNIFICANT CHANGE UP (ref 1.8–2.4)
MAGNESIUM SERPL-MCNC: 2.2 MG/DL — SIGNIFICANT CHANGE UP (ref 1.8–2.4)
MAGNESIUM SERPL-MCNC: 2.3 MG/DL — SIGNIFICANT CHANGE UP (ref 1.8–2.4)
MAGNESIUM SERPL-MCNC: 2.3 MG/DL — SIGNIFICANT CHANGE UP (ref 1.8–2.4)
MAGNESIUM SERPL-MCNC: 2.5 MG/DL — HIGH (ref 1.8–2.4)
MANUAL SMEAR VERIFICATION: SIGNIFICANT CHANGE UP
MCHC RBC-ENTMCNC: 33.9 G/DL — SIGNIFICANT CHANGE UP (ref 32–37)
MCHC RBC-ENTMCNC: 34 G/DL — SIGNIFICANT CHANGE UP (ref 32–37)
MCHC RBC-ENTMCNC: 34.1 G/DL — SIGNIFICANT CHANGE UP (ref 32–37)
MCHC RBC-ENTMCNC: 34.2 G/DL — SIGNIFICANT CHANGE UP (ref 32–37)
MCHC RBC-ENTMCNC: 34.3 G/DL — SIGNIFICANT CHANGE UP (ref 32–37)
MCHC RBC-ENTMCNC: 34.4 G/DL — SIGNIFICANT CHANGE UP (ref 32–37)
MCHC RBC-ENTMCNC: 34.6 G/DL — SIGNIFICANT CHANGE UP (ref 32–37)
MCHC RBC-ENTMCNC: 34.7 G/DL — SIGNIFICANT CHANGE UP (ref 32–37)
MCHC RBC-ENTMCNC: 34.7 G/DL — SIGNIFICANT CHANGE UP (ref 32–37)
MCHC RBC-ENTMCNC: 34.8 G/DL — SIGNIFICANT CHANGE UP (ref 32–37)
MCHC RBC-ENTMCNC: 35.1 G/DL — SIGNIFICANT CHANGE UP (ref 32–37)
MCHC RBC-ENTMCNC: 35.4 G/DL — SIGNIFICANT CHANGE UP (ref 32–37)
MCHC RBC-ENTMCNC: 35.4 G/DL — SIGNIFICANT CHANGE UP (ref 32–37)
MCHC RBC-ENTMCNC: 35.5 G/DL — SIGNIFICANT CHANGE UP (ref 32–37)
MCHC RBC-ENTMCNC: 35.8 PG — HIGH (ref 27–31)
MCHC RBC-ENTMCNC: 36 G/DL — SIGNIFICANT CHANGE UP (ref 32–37)
MCHC RBC-ENTMCNC: 36 PG — HIGH (ref 27–31)
MCHC RBC-ENTMCNC: 36.1 PG — HIGH (ref 27–31)
MCHC RBC-ENTMCNC: 36.2 PG — HIGH (ref 27–31)
MCHC RBC-ENTMCNC: 36.3 PG — HIGH (ref 27–31)
MCHC RBC-ENTMCNC: 36.4 PG — HIGH (ref 27–31)
MCHC RBC-ENTMCNC: 36.4 PG — HIGH (ref 27–31)
MCHC RBC-ENTMCNC: 36.5 PG — HIGH (ref 27–31)
MCHC RBC-ENTMCNC: 36.5 PG — HIGH (ref 27–31)
MCHC RBC-ENTMCNC: 36.8 PG — HIGH (ref 27–31)
MCHC RBC-ENTMCNC: 37 PG — HIGH (ref 27–31)
MCHC RBC-ENTMCNC: 37.2 PG — HIGH (ref 27–31)
MCHC RBC-ENTMCNC: 37.5 PG — HIGH (ref 27–31)
MCHC RBC-ENTMCNC: 37.8 PG — HIGH (ref 27–31)
MCHC RBC-ENTMCNC: 37.8 PG — HIGH (ref 27–31)
MCV RBC AUTO: 103.6 FL — HIGH (ref 81–99)
MCV RBC AUTO: 104.9 FL — HIGH (ref 81–99)
MCV RBC AUTO: 105.1 FL — HIGH (ref 81–99)
MCV RBC AUTO: 105.2 FL — HIGH (ref 81–99)
MCV RBC AUTO: 105.3 FL — HIGH (ref 81–99)
MCV RBC AUTO: 105.6 FL — HIGH (ref 81–99)
MCV RBC AUTO: 105.7 FL — HIGH (ref 81–99)
MCV RBC AUTO: 105.8 FL — HIGH (ref 81–99)
MCV RBC AUTO: 105.8 FL — HIGH (ref 81–99)
MCV RBC AUTO: 106.4 FL — HIGH (ref 81–99)
MCV RBC AUTO: 106.6 FL — HIGH (ref 81–99)
MCV RBC AUTO: 107 FL — HIGH (ref 81–99)
MCV RBC AUTO: 107 FL — HIGH (ref 81–99)
MCV RBC AUTO: 107.6 FL — HIGH (ref 81–99)
MCV RBC AUTO: 108.1 FL — HIGH (ref 81–99)
MCV RBC AUTO: 108.4 FL — HIGH (ref 81–99)
MCV RBC AUTO: 108.4 FL — HIGH (ref 81–99)
MCV RBC AUTO: 108.8 FL — HIGH (ref 81–99)
METHGB MFR BLDA: 1.5 % — SIGNIFICANT CHANGE UP (ref 0.5–1.5)
METHGB MFR BLDA: 2.4 % — HIGH (ref 0.5–1.5)
METHGB MFR BLDA: 3.3 % — SIGNIFICANT CHANGE UP
MONOCYTES # BLD AUTO: 0.36 K/UL — SIGNIFICANT CHANGE UP (ref 0.1–0.6)
MONOCYTES # BLD AUTO: 0.37 K/UL — SIGNIFICANT CHANGE UP (ref 0.1–0.6)
MONOCYTES # BLD AUTO: 0.38 K/UL — SIGNIFICANT CHANGE UP (ref 0.1–0.6)
MONOCYTES # BLD AUTO: 0.43 K/UL — SIGNIFICANT CHANGE UP (ref 0.1–0.6)
MONOCYTES # BLD AUTO: 0.45 K/UL — SIGNIFICANT CHANGE UP (ref 0.1–0.6)
MONOCYTES # BLD AUTO: 0.51 K/UL — SIGNIFICANT CHANGE UP (ref 0.1–0.6)
MONOCYTES # BLD AUTO: 0.53 K/UL — SIGNIFICANT CHANGE UP (ref 0.1–0.6)
MONOCYTES # BLD AUTO: 0.53 K/UL — SIGNIFICANT CHANGE UP (ref 0.1–0.6)
MONOCYTES # BLD AUTO: 0.67 K/UL — HIGH (ref 0.1–0.6)
MONOCYTES # BLD AUTO: 0.68 K/UL — HIGH (ref 0.1–0.6)
MONOCYTES # BLD AUTO: 0.7 K/UL — HIGH (ref 0.1–0.6)
MONOCYTES # BLD AUTO: 0.7 K/UL — HIGH (ref 0.1–0.6)
MONOCYTES # BLD AUTO: 0.81 K/UL — HIGH (ref 0.1–0.6)
MONOCYTES # BLD AUTO: 1.59 K/UL — HIGH (ref 0.1–0.6)
MONOCYTES NFR BLD AUTO: 10.1 % — HIGH (ref 1.7–9.3)
MONOCYTES NFR BLD AUTO: 10.6 % — HIGH (ref 1.7–9.3)
MONOCYTES NFR BLD AUTO: 10.9 % — HIGH (ref 1.7–9.3)
MONOCYTES NFR BLD AUTO: 12.3 % — HIGH (ref 1.7–9.3)
MONOCYTES NFR BLD AUTO: 12.8 % — HIGH (ref 1.7–9.3)
MONOCYTES NFR BLD AUTO: 13 % — HIGH (ref 1.7–9.3)
MONOCYTES NFR BLD AUTO: 13.3 % — HIGH (ref 1.7–9.3)
MONOCYTES NFR BLD AUTO: 5.4 % — SIGNIFICANT CHANGE UP (ref 1.7–9.3)
MONOCYTES NFR BLD AUTO: 6 % — SIGNIFICANT CHANGE UP (ref 1.7–9.3)
MONOCYTES NFR BLD AUTO: 6.5 % — SIGNIFICANT CHANGE UP (ref 1.7–9.3)
MONOCYTES NFR BLD AUTO: 8.4 % — SIGNIFICANT CHANGE UP (ref 1.7–9.3)
MONOCYTES NFR BLD AUTO: 8.8 % — SIGNIFICANT CHANGE UP (ref 1.7–9.3)
MONOCYTES NFR BLD AUTO: 8.9 % — SIGNIFICANT CHANGE UP (ref 1.7–9.3)
MONOCYTES NFR BLD AUTO: 9 % — SIGNIFICANT CHANGE UP (ref 1.7–9.3)
MRSA PCR RESULT.: NEGATIVE — SIGNIFICANT CHANGE UP
MYELOCYTES NFR BLD: 0.9 % — HIGH (ref 0–0)
NEUTROPHILS # BLD AUTO: 10.1 K/UL — HIGH (ref 1.4–6.5)
NEUTROPHILS # BLD AUTO: 10.94 K/UL — HIGH (ref 1.4–6.5)
NEUTROPHILS # BLD AUTO: 11.24 K/UL — HIGH (ref 1.4–6.5)
NEUTROPHILS # BLD AUTO: 2.19 K/UL — SIGNIFICANT CHANGE UP (ref 1.4–6.5)
NEUTROPHILS # BLD AUTO: 2.77 K/UL — SIGNIFICANT CHANGE UP (ref 1.4–6.5)
NEUTROPHILS # BLD AUTO: 3.06 K/UL — SIGNIFICANT CHANGE UP (ref 1.4–6.5)
NEUTROPHILS # BLD AUTO: 3.18 K/UL — SIGNIFICANT CHANGE UP (ref 1.4–6.5)
NEUTROPHILS # BLD AUTO: 3.59 K/UL — SIGNIFICANT CHANGE UP (ref 1.4–6.5)
NEUTROPHILS # BLD AUTO: 3.79 K/UL — SIGNIFICANT CHANGE UP (ref 1.4–6.5)
NEUTROPHILS # BLD AUTO: 3.85 K/UL — SIGNIFICANT CHANGE UP (ref 1.4–6.5)
NEUTROPHILS # BLD AUTO: 4.19 K/UL — SIGNIFICANT CHANGE UP (ref 1.4–6.5)
NEUTROPHILS # BLD AUTO: 4.87 K/UL — SIGNIFICANT CHANGE UP (ref 1.4–6.5)
NEUTROPHILS # BLD AUTO: 6.66 K/UL — HIGH (ref 1.4–6.5)
NEUTROPHILS # BLD AUTO: 9.95 K/UL — HIGH (ref 1.4–6.5)
NEUTROPHILS NFR BLD AUTO: 73.5 % — SIGNIFICANT CHANGE UP (ref 42.2–75.2)
NEUTROPHILS NFR BLD AUTO: 74.9 % — SIGNIFICANT CHANGE UP (ref 42.2–75.2)
NEUTROPHILS NFR BLD AUTO: 75.7 % — HIGH (ref 42.2–75.2)
NEUTROPHILS NFR BLD AUTO: 76.9 % — HIGH (ref 42.2–75.2)
NEUTROPHILS NFR BLD AUTO: 78 % — HIGH (ref 42.2–75.2)
NEUTROPHILS NFR BLD AUTO: 79.5 % — HIGH (ref 42.2–75.2)
NEUTROPHILS NFR BLD AUTO: 80.1 % — HIGH (ref 42.2–75.2)
NEUTROPHILS NFR BLD AUTO: 81.1 % — HIGH (ref 42.2–75.2)
NEUTROPHILS NFR BLD AUTO: 81.6 % — HIGH (ref 42.2–75.2)
NEUTROPHILS NFR BLD AUTO: 82.6 % — HIGH (ref 42.2–75.2)
NEUTROPHILS NFR BLD AUTO: 84 % — HIGH (ref 42.2–75.2)
NEUTROPHILS NFR BLD AUTO: 88.3 % — HIGH (ref 42.2–75.2)
NEUTROPHILS NFR BLD AUTO: 89.1 % — HIGH (ref 42.2–75.2)
NEUTROPHILS NFR BLD AUTO: 89.5 % — HIGH (ref 42.2–75.2)
NITRITE UR-MCNC: NEGATIVE — SIGNIFICANT CHANGE UP
NITRITE UR-MCNC: NEGATIVE — SIGNIFICANT CHANGE UP
NRBC # BLD: 0 /100 WBCS — SIGNIFICANT CHANGE UP (ref 0–0)
NRBC # BLD: 1 /100 WBCS — HIGH (ref 0–0)
NRBC # BLD: 2 /100 WBCS — HIGH (ref 0–0)
NRBC # BLD: 2 /100 WBCS — HIGH (ref 0–0)
NRBC # BLD: 3 /100 WBCS — HIGH (ref 0–0)
NRBC # BLD: 3 /100 — HIGH (ref 0–0)
NRBC # BLD: 4 /100 WBCS — HIGH (ref 0–0)
NRBC # BLD: 8 /100 WBCS — HIGH (ref 0–0)
NRBC # BLD: SIGNIFICANT CHANGE UP /100 WBCS (ref 0–0)
NT-PROBNP SERPL-SCNC: 3896 PG/ML — HIGH (ref 0–300)
OSMOLALITY SERPL: 314 MOSMOL/KG — HIGH (ref 280–301)
OXYHGB MFR BLDA: 87 % — LOW (ref 94–100)
OXYHGB MFR BLDA: 94 % — SIGNIFICANT CHANGE UP (ref 94–100)
OXYHGB MFR BLDA: 96 % — SIGNIFICANT CHANGE UP (ref 94–100)
PCO2 BLDA: 33 MMHG — LOW (ref 38–42)
PCO2 BLDA: 38 MMHG — SIGNIFICANT CHANGE UP (ref 38–42)
PCO2 BLDA: 42 MMHG — SIGNIFICANT CHANGE UP (ref 38–42)
PCO2 BLDA: 44 MMHG — HIGH (ref 38–42)
PCO2 BLDA: 48 MMHG — HIGH (ref 38–42)
PCO2 BLDA: 56 MMHG — HIGH (ref 38–42)
PCO2 BLDA: 63 MMHG — CRITICAL HIGH (ref 38–42)
PCO2 BLDA: 63 MMHG — CRITICAL HIGH (ref 38–42)
PCO2 BLDA: 68 MMHG — CRITICAL HIGH (ref 38–42)
PF4 HEPARIN CMPLX AB SER-ACNC: NEGATIVE — SIGNIFICANT CHANGE UP
PF4 HEPARIN CMPLX AB SER-ACNC: NEGATIVE — SIGNIFICANT CHANGE UP
PH BLDA: 7.08 — CRITICAL LOW (ref 7.38–7.42)
PH BLDA: 7.1 — CRITICAL LOW (ref 7.38–7.42)
PH BLDA: 7.1 — CRITICAL LOW (ref 7.38–7.42)
PH BLDA: 7.12 — CRITICAL LOW (ref 7.38–7.42)
PH BLDA: 7.16 — CRITICAL LOW (ref 7.38–7.42)
PH BLDA: 7.16 — CRITICAL LOW (ref 7.38–7.42)
PH BLDA: 7.2 — LOW (ref 7.38–7.42)
PH BLDA: 7.28 — LOW (ref 7.38–7.42)
PH BLDA: 7.34 — LOW (ref 7.38–7.42)
PH BLDA: 7.37 — LOW (ref 7.38–7.42)
PH BLDA: 7.41 — SIGNIFICANT CHANGE UP (ref 7.38–7.42)
PH UR: 6 — SIGNIFICANT CHANGE UP (ref 5–8)
PH UR: 6 — SIGNIFICANT CHANGE UP (ref 5–8)
PHOSPHATE SERPL-MCNC: 6.9 MG/DL — HIGH (ref 2.1–4.9)
PLAT MORPH BLD: NORMAL — SIGNIFICANT CHANGE UP
PLATELET # BLD AUTO: 117 K/UL — LOW (ref 130–400)
PLATELET # BLD AUTO: 118 K/UL — LOW (ref 130–400)
PLATELET # BLD AUTO: 138 K/UL — SIGNIFICANT CHANGE UP (ref 130–400)
PLATELET # BLD AUTO: 157 K/UL — SIGNIFICANT CHANGE UP (ref 130–400)
PLATELET # BLD AUTO: 201 K/UL — SIGNIFICANT CHANGE UP (ref 130–400)
PLATELET # BLD AUTO: 232 K/UL — SIGNIFICANT CHANGE UP (ref 130–400)
PLATELET # BLD AUTO: 233 K/UL — SIGNIFICANT CHANGE UP (ref 130–400)
PLATELET # BLD AUTO: 236 K/UL — SIGNIFICANT CHANGE UP (ref 130–400)
PLATELET # BLD AUTO: 238 K/UL — SIGNIFICANT CHANGE UP (ref 130–400)
PLATELET # BLD AUTO: 242 K/UL — SIGNIFICANT CHANGE UP (ref 130–400)
PLATELET # BLD AUTO: 242 K/UL — SIGNIFICANT CHANGE UP (ref 130–400)
PLATELET # BLD AUTO: 251 K/UL — SIGNIFICANT CHANGE UP (ref 130–400)
PLATELET # BLD AUTO: 253 K/UL — SIGNIFICANT CHANGE UP (ref 130–400)
PLATELET # BLD AUTO: 40 K/UL — LOW (ref 130–400)
PLATELET # BLD AUTO: 43 K/UL — LOW (ref 130–400)
PLATELET # BLD AUTO: 44 K/UL — LOW (ref 130–400)
PLATELET # BLD AUTO: 48 K/UL — LOW (ref 130–400)
PLATELET # BLD AUTO: 55 K/UL — LOW (ref 130–400)
PLATELET COUNT - ESTIMATE: ABNORMAL
PO2 BLDA: 107 MMHG — HIGH (ref 78–95)
PO2 BLDA: 141 MMHG — HIGH (ref 78–95)
PO2 BLDA: 194 MMHG — HIGH (ref 78–95)
PO2 BLDA: 37 MMHG — CRITICAL LOW (ref 78–95)
PO2 BLDA: 38 MMHG — CRITICAL LOW (ref 78–95)
PO2 BLDA: 44 MMHG — LOW (ref 78–95)
PO2 BLDA: 47 MMHG — LOW (ref 78–95)
PO2 BLDA: 69 MMHG — LOW (ref 78–95)
PO2 BLDA: 74 MMHG — LOW (ref 78–95)
PO2 BLDA: 74 MMHG — LOW (ref 78–95)
PO2 BLDA: 77 MMHG — LOW (ref 78–95)
POTASSIUM SERPL-MCNC: 3.3 MMOL/L — LOW (ref 3.5–5)
POTASSIUM SERPL-MCNC: 3.7 MMOL/L — SIGNIFICANT CHANGE UP (ref 3.5–5)
POTASSIUM SERPL-MCNC: 3.8 MMOL/L — SIGNIFICANT CHANGE UP (ref 3.5–5)
POTASSIUM SERPL-MCNC: 4 MMOL/L — SIGNIFICANT CHANGE UP (ref 3.5–5)
POTASSIUM SERPL-MCNC: 4.4 MMOL/L — SIGNIFICANT CHANGE UP (ref 3.5–5)
POTASSIUM SERPL-MCNC: 4.4 MMOL/L — SIGNIFICANT CHANGE UP (ref 3.5–5)
POTASSIUM SERPL-MCNC: 4.5 MMOL/L — SIGNIFICANT CHANGE UP (ref 3.5–5)
POTASSIUM SERPL-MCNC: 4.7 MMOL/L — SIGNIFICANT CHANGE UP (ref 3.5–5)
POTASSIUM SERPL-MCNC: 4.8 MMOL/L — SIGNIFICANT CHANGE UP (ref 3.5–5)
POTASSIUM SERPL-MCNC: 4.9 MMOL/L — SIGNIFICANT CHANGE UP (ref 3.5–5)
POTASSIUM SERPL-MCNC: 5.1 MMOL/L — HIGH (ref 3.5–5)
POTASSIUM SERPL-MCNC: 5.3 MMOL/L — HIGH (ref 3.5–5)
POTASSIUM SERPL-MCNC: 5.5 MMOL/L — HIGH (ref 3.5–5)
POTASSIUM SERPL-SCNC: 3.3 MMOL/L — LOW (ref 3.5–5)
POTASSIUM SERPL-SCNC: 3.7 MMOL/L — SIGNIFICANT CHANGE UP (ref 3.5–5)
POTASSIUM SERPL-SCNC: 3.8 MMOL/L — SIGNIFICANT CHANGE UP (ref 3.5–5)
POTASSIUM SERPL-SCNC: 4 MMOL/L — SIGNIFICANT CHANGE UP (ref 3.5–5)
POTASSIUM SERPL-SCNC: 4.4 MMOL/L — SIGNIFICANT CHANGE UP (ref 3.5–5)
POTASSIUM SERPL-SCNC: 4.4 MMOL/L — SIGNIFICANT CHANGE UP (ref 3.5–5)
POTASSIUM SERPL-SCNC: 4.5 MMOL/L — SIGNIFICANT CHANGE UP (ref 3.5–5)
POTASSIUM SERPL-SCNC: 4.7 MMOL/L — SIGNIFICANT CHANGE UP (ref 3.5–5)
POTASSIUM SERPL-SCNC: 4.8 MMOL/L — SIGNIFICANT CHANGE UP (ref 3.5–5)
POTASSIUM SERPL-SCNC: 4.9 MMOL/L — SIGNIFICANT CHANGE UP (ref 3.5–5)
POTASSIUM SERPL-SCNC: 5.1 MMOL/L — HIGH (ref 3.5–5)
POTASSIUM SERPL-SCNC: 5.3 MMOL/L — HIGH (ref 3.5–5)
POTASSIUM SERPL-SCNC: 5.5 MMOL/L — HIGH (ref 3.5–5)
PROT SERPL-MCNC: 4.3 G/DL — LOW (ref 6–8)
PROT SERPL-MCNC: 4.4 G/DL — LOW (ref 6–8)
PROT SERPL-MCNC: 5 G/DL — LOW (ref 6–8)
PROT SERPL-MCNC: 5.2 G/DL — LOW (ref 6–8)
PROT SERPL-MCNC: 5.3 G/DL — LOW (ref 6–8)
PROT SERPL-MCNC: 5.4 G/DL — LOW (ref 6–8)
PROT SERPL-MCNC: 5.5 G/DL — LOW (ref 6–8)
PROT SERPL-MCNC: 5.5 G/DL — LOW (ref 6–8)
PROT SERPL-MCNC: 5.6 G/DL — LOW (ref 6–8)
PROT SERPL-MCNC: 5.7 G/DL — LOW (ref 6–8)
PROT UR-MCNC: ABNORMAL
PROT UR-MCNC: ABNORMAL
PROTHROM AB SERPL-ACNC: 11.7 SEC — SIGNIFICANT CHANGE UP (ref 9.95–12.87)
PROTHROM AB SERPL-ACNC: 13.5 SEC — HIGH (ref 9.95–12.87)
PROTHROM AB SERPL-ACNC: 13.6 SEC — HIGH (ref 9.95–12.87)
PROTHROM AB SERPL-ACNC: 18.9 SEC — HIGH (ref 9.95–12.87)
RBC # BLD: 2.62 M/UL — LOW (ref 4.2–5.4)
RBC # BLD: 2.73 M/UL — LOW (ref 4.2–5.4)
RBC # BLD: 2.73 M/UL — LOW (ref 4.2–5.4)
RBC # BLD: 2.75 M/UL — LOW (ref 4.2–5.4)
RBC # BLD: 2.88 M/UL — LOW (ref 4.2–5.4)
RBC # BLD: 2.96 M/UL — LOW (ref 4.2–5.4)
RBC # BLD: 2.96 M/UL — LOW (ref 4.2–5.4)
RBC # BLD: 2.98 M/UL — LOW (ref 4.2–5.4)
RBC # BLD: 2.99 M/UL — LOW (ref 4.2–5.4)
RBC # BLD: 2.99 M/UL — LOW (ref 4.2–5.4)
RBC # BLD: 3.02 M/UL — LOW (ref 4.2–5.4)
RBC # BLD: 3.02 M/UL — LOW (ref 4.2–5.4)
RBC # BLD: 3.08 M/UL — LOW (ref 4.2–5.4)
RBC # BLD: 3.09 M/UL — LOW (ref 4.2–5.4)
RBC # BLD: 3.18 M/UL — LOW (ref 4.2–5.4)
RBC # BLD: 3.34 M/UL — LOW (ref 4.2–5.4)
RBC # FLD: 16.3 % — HIGH (ref 11.5–14.5)
RBC # FLD: 16.4 % — HIGH (ref 11.5–14.5)
RBC # FLD: 16.5 % — HIGH (ref 11.5–14.5)
RBC # FLD: 16.6 % — HIGH (ref 11.5–14.5)
RBC # FLD: 16.7 % — HIGH (ref 11.5–14.5)
RBC # FLD: 16.7 % — HIGH (ref 11.5–14.5)
RBC # FLD: 16.9 % — HIGH (ref 11.5–14.5)
RBC # FLD: 16.9 % — HIGH (ref 11.5–14.5)
RBC # FLD: 17 % — HIGH (ref 11.5–14.5)
RBC # FLD: 17.2 % — HIGH (ref 11.5–14.5)
RBC # FLD: 17.6 % — HIGH (ref 11.5–14.5)
RBC # FLD: 17.6 % — HIGH (ref 11.5–14.5)
RBC # FLD: 17.8 % — HIGH (ref 11.5–14.5)
RBC # FLD: 18.1 % — HIGH (ref 11.5–14.5)
RBC # FLD: 18.2 % — HIGH (ref 11.5–14.5)
RBC # FLD: 18.4 % — HIGH (ref 11.5–14.5)
RBC BLD AUTO: ABNORMAL
RBC CASTS # UR COMP ASSIST: 10 /HPF — HIGH (ref 0–4)
RBC CASTS # UR COMP ASSIST: 26 /HPF — HIGH (ref 0–4)
SAO2 % BLDA: 49 % — LOW (ref 92–96)
SAO2 % BLDA: 51 % — LOW (ref 92–96)
SAO2 % BLDA: 65 % — LOW (ref 92–96)
SAO2 % BLDA: 72 % — CRITICAL LOW (ref 94–98)
SAO2 % BLDA: 86 % — LOW (ref 94–98)
SAO2 % BLDA: 88 % — LOW (ref 92–96)
SAO2 % BLDA: 89 % — LOW (ref 94–98)
SAO2 % BLDA: 93 % — LOW (ref 94–98)
SAO2 % BLDA: 98 % — SIGNIFICANT CHANGE UP (ref 94–98)
SODIUM SERPL-SCNC: 127 MMOL/L — LOW (ref 135–146)
SODIUM SERPL-SCNC: 131 MMOL/L — LOW (ref 135–146)
SODIUM SERPL-SCNC: 131 MMOL/L — LOW (ref 135–146)
SODIUM SERPL-SCNC: 132 MMOL/L — LOW (ref 135–146)
SODIUM SERPL-SCNC: 133 MMOL/L — LOW (ref 135–146)
SODIUM SERPL-SCNC: 133 MMOL/L — LOW (ref 135–146)
SODIUM SERPL-SCNC: 134 MMOL/L — LOW (ref 135–146)
SODIUM SERPL-SCNC: 134 MMOL/L — LOW (ref 135–146)
SODIUM SERPL-SCNC: 135 MMOL/L — SIGNIFICANT CHANGE UP (ref 135–146)
SODIUM SERPL-SCNC: 135 MMOL/L — SIGNIFICANT CHANGE UP (ref 135–146)
SODIUM SERPL-SCNC: 136 MMOL/L — SIGNIFICANT CHANGE UP (ref 135–146)
SODIUM SERPL-SCNC: 138 MMOL/L — SIGNIFICANT CHANGE UP (ref 135–146)
SODIUM SERPL-SCNC: 138 MMOL/L — SIGNIFICANT CHANGE UP (ref 135–146)
SP GR SPEC: 1.02 — SIGNIFICANT CHANGE UP (ref 1.01–1.02)
SP GR SPEC: 1.03 — HIGH (ref 1.01–1.02)
SPECIMEN SOURCE: SIGNIFICANT CHANGE UP
TROPONIN T SERPL-MCNC: <0.01 NG/ML — SIGNIFICANT CHANGE UP
TSH SERPL-MCNC: 6.55 UIU/ML — HIGH (ref 0.27–4.2)
UROBILINOGEN FLD QL: SIGNIFICANT CHANGE UP
UROBILINOGEN FLD QL: SIGNIFICANT CHANGE UP
VIT B12 SERPL-MCNC: 811 PG/ML — SIGNIFICANT CHANGE UP (ref 232–1245)
WBC # BLD: 11.17 K/UL — HIGH (ref 4.8–10.8)
WBC # BLD: 12.17 K/UL — HIGH (ref 4.8–10.8)
WBC # BLD: 12.23 K/UL — HIGH (ref 4.8–10.8)
WBC # BLD: 12.4 K/UL — HIGH (ref 4.8–10.8)
WBC # BLD: 12.56 K/UL — HIGH (ref 4.8–10.8)
WBC # BLD: 2.98 K/UL — LOW (ref 4.8–10.8)
WBC # BLD: 3.66 K/UL — LOW (ref 4.8–10.8)
WBC # BLD: 3.98 K/UL — LOW (ref 4.8–10.8)
WBC # BLD: 3.98 K/UL — LOW (ref 4.8–10.8)
WBC # BLD: 4.25 K/UL — LOW (ref 4.8–10.8)
WBC # BLD: 4.42 K/UL — LOW (ref 4.8–10.8)
WBC # BLD: 4.81 K/UL — SIGNIFICANT CHANGE UP (ref 4.8–10.8)
WBC # BLD: 4.86 K/UL — SIGNIFICANT CHANGE UP (ref 4.8–10.8)
WBC # BLD: 5.27 K/UL — SIGNIFICANT CHANGE UP (ref 4.8–10.8)
WBC # BLD: 5.4 K/UL — SIGNIFICANT CHANGE UP (ref 4.8–10.8)
WBC # BLD: 5.94 K/UL — SIGNIFICANT CHANGE UP (ref 4.8–10.8)
WBC # BLD: 5.97 K/UL — SIGNIFICANT CHANGE UP (ref 4.8–10.8)
WBC # BLD: 7.93 K/UL — SIGNIFICANT CHANGE UP (ref 4.8–10.8)
WBC # FLD AUTO: 11.17 K/UL — HIGH (ref 4.8–10.8)
WBC # FLD AUTO: 12.17 K/UL — HIGH (ref 4.8–10.8)
WBC # FLD AUTO: 12.23 K/UL — HIGH (ref 4.8–10.8)
WBC # FLD AUTO: 12.4 K/UL — HIGH (ref 4.8–10.8)
WBC # FLD AUTO: 12.56 K/UL — HIGH (ref 4.8–10.8)
WBC # FLD AUTO: 2.98 K/UL — LOW (ref 4.8–10.8)
WBC # FLD AUTO: 3.66 K/UL — LOW (ref 4.8–10.8)
WBC # FLD AUTO: 3.98 K/UL — LOW (ref 4.8–10.8)
WBC # FLD AUTO: 3.98 K/UL — LOW (ref 4.8–10.8)
WBC # FLD AUTO: 4.25 K/UL — LOW (ref 4.8–10.8)
WBC # FLD AUTO: 4.42 K/UL — LOW (ref 4.8–10.8)
WBC # FLD AUTO: 4.81 K/UL — SIGNIFICANT CHANGE UP (ref 4.8–10.8)
WBC # FLD AUTO: 4.86 K/UL — SIGNIFICANT CHANGE UP (ref 4.8–10.8)
WBC # FLD AUTO: 5.27 K/UL — SIGNIFICANT CHANGE UP (ref 4.8–10.8)
WBC # FLD AUTO: 5.4 K/UL — SIGNIFICANT CHANGE UP (ref 4.8–10.8)
WBC # FLD AUTO: 5.94 K/UL — SIGNIFICANT CHANGE UP (ref 4.8–10.8)
WBC # FLD AUTO: 5.97 K/UL — SIGNIFICANT CHANGE UP (ref 4.8–10.8)
WBC # FLD AUTO: 7.93 K/UL — SIGNIFICANT CHANGE UP (ref 4.8–10.8)
WBC UR QL: 17 /HPF — HIGH (ref 0–5)
WBC UR QL: 6 /HPF — HIGH (ref 0–5)

## 2020-01-01 PROCEDURE — 99231 SBSQ HOSP IP/OBS SF/LOW 25: CPT

## 2020-01-01 PROCEDURE — 95819 EEG AWAKE AND ASLEEP: CPT | Mod: 26

## 2020-01-01 PROCEDURE — 99221 1ST HOSP IP/OBS SF/LOW 40: CPT

## 2020-01-01 PROCEDURE — 93306 TTE W/DOPPLER COMPLETE: CPT | Mod: 26

## 2020-01-01 PROCEDURE — 71045 X-RAY EXAM CHEST 1 VIEW: CPT | Mod: 26

## 2020-01-01 PROCEDURE — 11042 DBRDMT SUBQ TIS 1ST 20SQCM/<: CPT

## 2020-01-01 PROCEDURE — 99223 1ST HOSP IP/OBS HIGH 75: CPT

## 2020-01-01 PROCEDURE — 71045 X-RAY EXAM CHEST 1 VIEW: CPT | Mod: 26,77

## 2020-01-01 PROCEDURE — 99233 SBSQ HOSP IP/OBS HIGH 50: CPT

## 2020-01-01 PROCEDURE — 93970 EXTREMITY STUDY: CPT | Mod: 26

## 2020-01-01 PROCEDURE — 99291 CRITICAL CARE FIRST HOUR: CPT

## 2020-01-01 PROCEDURE — 36556 INSERT NON-TUNNEL CV CATH: CPT

## 2020-01-01 PROCEDURE — 71045 X-RAY EXAM CHEST 1 VIEW: CPT | Mod: 26,76

## 2020-01-01 PROCEDURE — 70450 CT HEAD/BRAIN W/O DYE: CPT | Mod: 26

## 2020-01-01 PROCEDURE — 99292 CRITICAL CARE ADDL 30 MIN: CPT

## 2020-01-01 PROCEDURE — 93925 LOWER EXTREMITY STUDY: CPT | Mod: 26

## 2020-01-01 PROCEDURE — 99285 EMERGENCY DEPT VISIT HI MDM: CPT

## 2020-01-01 PROCEDURE — 76705 ECHO EXAM OF ABDOMEN: CPT | Mod: 26

## 2020-01-01 PROCEDURE — 99251: CPT | Mod: 25

## 2020-01-01 PROCEDURE — 93010 ELECTROCARDIOGRAM REPORT: CPT

## 2020-01-01 PROCEDURE — 74018 RADEX ABDOMEN 1 VIEW: CPT | Mod: 26

## 2020-01-01 PROCEDURE — 99222 1ST HOSP IP/OBS MODERATE 55: CPT

## 2020-01-01 PROCEDURE — 76770 US EXAM ABDO BACK WALL COMP: CPT | Mod: 26

## 2020-01-01 PROCEDURE — 99255 IP/OBS CONSLTJ NEW/EST HI 80: CPT

## 2020-01-01 RX ORDER — PHENYLEPHRINE HYDROCHLORIDE 10 MG/ML
0.1 INJECTION INTRAVENOUS
Qty: 160 | Refills: 0 | Status: DISCONTINUED | OUTPATIENT
Start: 2020-01-01 | End: 2020-01-01

## 2020-01-01 RX ORDER — SODIUM CHLORIDE 9 MG/ML
500 INJECTION, SOLUTION INTRAVENOUS ONCE
Refills: 0 | Status: COMPLETED | OUTPATIENT
Start: 2020-01-01 | End: 2020-01-01

## 2020-01-01 RX ORDER — OCTREOTIDE ACETATE 200 UG/ML
500 INJECTION, SOLUTION INTRAVENOUS; SUBCUTANEOUS ONCE
Refills: 0 | Status: COMPLETED | OUTPATIENT
Start: 2020-01-01 | End: 2020-01-01

## 2020-01-01 RX ORDER — FENTANYL CITRATE 50 UG/ML
25 INJECTION INTRAVENOUS ONCE
Refills: 0 | Status: DISCONTINUED | OUTPATIENT
Start: 2020-01-01 | End: 2020-01-01

## 2020-01-01 RX ORDER — MIDODRINE HYDROCHLORIDE 2.5 MG/1
5 TABLET ORAL THREE TIMES A DAY
Refills: 0 | Status: DISCONTINUED | OUTPATIENT
Start: 2020-01-01 | End: 2020-01-01

## 2020-01-01 RX ORDER — BUMETANIDE 0.25 MG/ML
2 INJECTION INTRAMUSCULAR; INTRAVENOUS
Qty: 20 | Refills: 0 | Status: DISCONTINUED | OUTPATIENT
Start: 2020-01-01 | End: 2020-01-01

## 2020-01-01 RX ORDER — HYDROCORTISONE 20 MG
100 TABLET ORAL ONCE
Refills: 0 | Status: COMPLETED | OUTPATIENT
Start: 2020-01-01 | End: 2020-01-01

## 2020-01-01 RX ORDER — CEFAZOLIN SODIUM 1 G
1000 VIAL (EA) INJECTION ONCE
Refills: 0 | Status: COMPLETED | OUTPATIENT
Start: 2020-01-01 | End: 2020-01-01

## 2020-01-01 RX ORDER — BUMETANIDE 0.25 MG/ML
2 INJECTION INTRAMUSCULAR; INTRAVENOUS ONCE
Refills: 0 | Status: COMPLETED | OUTPATIENT
Start: 2020-01-01 | End: 2020-01-01

## 2020-01-01 RX ORDER — OCTREOTIDE ACETATE 200 UG/ML
100 INJECTION, SOLUTION INTRAVENOUS; SUBCUTANEOUS EVERY 8 HOURS
Refills: 0 | Status: DISCONTINUED | OUTPATIENT
Start: 2020-01-01 | End: 2020-01-01

## 2020-01-01 RX ORDER — MORPHINE SULFATE 50 MG/1
10 CAPSULE, EXTENDED RELEASE ORAL ONCE
Refills: 0 | Status: DISCONTINUED | OUTPATIENT
Start: 2020-01-01 | End: 2020-01-01

## 2020-01-01 RX ORDER — MILRINONE LACTATE 1 MG/ML
0.12 INJECTION, SOLUTION INTRAVENOUS
Qty: 20 | Refills: 0 | Status: DISCONTINUED | OUTPATIENT
Start: 2020-01-01 | End: 2020-01-01

## 2020-01-01 RX ORDER — PANTOPRAZOLE SODIUM 20 MG/1
40 TABLET, DELAYED RELEASE ORAL
Refills: 0 | Status: DISCONTINUED | OUTPATIENT
Start: 2020-01-01 | End: 2020-01-01

## 2020-01-01 RX ORDER — VASOPRESSIN 20 [USP'U]/ML
0.04 INJECTION INTRAVENOUS
Qty: 50 | Refills: 0 | Status: DISCONTINUED | OUTPATIENT
Start: 2020-01-01 | End: 2020-01-01

## 2020-01-01 RX ORDER — DEXTROSE 10 % IN WATER 10 %
250 INTRAVENOUS SOLUTION INTRAVENOUS
Refills: 0 | Status: COMPLETED | OUTPATIENT
Start: 2020-01-01 | End: 2020-01-01

## 2020-01-01 RX ORDER — NOREPINEPHRINE BITARTRATE/D5W 8 MG/250ML
0.05 PLASTIC BAG, INJECTION (ML) INTRAVENOUS
Qty: 8 | Refills: 0 | Status: DISCONTINUED | OUTPATIENT
Start: 2020-01-01 | End: 2020-01-01

## 2020-01-01 RX ORDER — POTASSIUM CHLORIDE 20 MEQ
40 PACKET (EA) ORAL EVERY 4 HOURS
Refills: 0 | Status: COMPLETED | OUTPATIENT
Start: 2020-01-01 | End: 2020-01-01

## 2020-01-01 RX ORDER — FENTANYL CITRATE 50 UG/ML
0.5 INJECTION INTRAVENOUS
Qty: 2500 | Refills: 0 | Status: DISCONTINUED | OUTPATIENT
Start: 2020-01-01 | End: 2020-01-01

## 2020-01-01 RX ORDER — SODIUM CHLORIDE 9 MG/ML
1000 INJECTION, SOLUTION INTRAVENOUS
Refills: 0 | Status: DISCONTINUED | OUTPATIENT
Start: 2020-01-01 | End: 2020-01-01

## 2020-01-01 RX ORDER — PANTOPRAZOLE SODIUM 20 MG/1
8 TABLET, DELAYED RELEASE ORAL
Qty: 80 | Refills: 0 | Status: DISCONTINUED | OUTPATIENT
Start: 2020-01-01 | End: 2020-01-01

## 2020-01-01 RX ORDER — FUROSEMIDE 40 MG
40 TABLET ORAL DAILY
Refills: 0 | Status: DISCONTINUED | OUTPATIENT
Start: 2020-01-01 | End: 2020-01-01

## 2020-01-01 RX ORDER — AMPICILLIN SODIUM AND SULBACTAM SODIUM 250; 125 MG/ML; MG/ML
3 INJECTION, POWDER, FOR SUSPENSION INTRAMUSCULAR; INTRAVENOUS EVERY 6 HOURS
Refills: 0 | Status: DISCONTINUED | OUTPATIENT
Start: 2020-01-01 | End: 2020-01-01

## 2020-01-01 RX ORDER — OXYCODONE AND ACETAMINOPHEN 5; 325 MG/1; MG/1
1 TABLET ORAL EVERY 6 HOURS
Refills: 0 | Status: DISCONTINUED | OUTPATIENT
Start: 2020-01-01 | End: 2020-01-01

## 2020-01-01 RX ORDER — DOBUTAMINE HCL 250MG/20ML
2.5 VIAL (ML) INTRAVENOUS
Qty: 1000 | Refills: 0 | Status: DISCONTINUED | OUTPATIENT
Start: 2020-01-01 | End: 2020-01-01

## 2020-01-01 RX ORDER — COLLAGENASE CLOSTRIDIUM HIST. 250 UNIT/G
1 OINTMENT (GRAM) TOPICAL
Refills: 0 | Status: DISCONTINUED | OUTPATIENT
Start: 2020-01-01 | End: 2020-01-01

## 2020-01-01 RX ORDER — SODIUM CHLORIDE 9 MG/ML
250 INJECTION INTRAMUSCULAR; INTRAVENOUS; SUBCUTANEOUS ONCE
Refills: 0 | Status: COMPLETED | OUTPATIENT
Start: 2020-01-01 | End: 2020-01-01

## 2020-01-01 RX ORDER — HEPARIN SODIUM 5000 [USP'U]/ML
1400 INJECTION INTRAVENOUS; SUBCUTANEOUS
Qty: 25000 | Refills: 0 | Status: DISCONTINUED | OUTPATIENT
Start: 2020-01-01 | End: 2020-01-01

## 2020-01-01 RX ORDER — FENTANYL CITRATE 50 UG/ML
50 INJECTION INTRAVENOUS ONCE
Refills: 0 | Status: DISCONTINUED | OUTPATIENT
Start: 2020-01-01 | End: 2020-01-01

## 2020-01-01 RX ORDER — SODIUM CHLORIDE 9 MG/ML
500 INJECTION INTRAMUSCULAR; INTRAVENOUS; SUBCUTANEOUS ONCE
Refills: 0 | Status: COMPLETED | OUTPATIENT
Start: 2020-01-01 | End: 2020-01-01

## 2020-01-01 RX ORDER — CHLORHEXIDINE GLUCONATE 213 G/1000ML
1 SOLUTION TOPICAL
Refills: 0 | Status: DISCONTINUED | OUTPATIENT
Start: 2020-01-01 | End: 2020-01-01

## 2020-01-01 RX ORDER — SODIUM BICARBONATE 1 MEQ/ML
650 SYRINGE (ML) INTRAVENOUS EVERY 8 HOURS
Refills: 0 | Status: DISCONTINUED | OUTPATIENT
Start: 2020-01-01 | End: 2020-01-01

## 2020-01-01 RX ORDER — FUROSEMIDE 40 MG
60 TABLET ORAL ONCE
Refills: 0 | Status: DISCONTINUED | OUTPATIENT
Start: 2020-01-01 | End: 2020-01-01

## 2020-01-01 RX ORDER — LOPERAMIDE HCL 2 MG
2 TABLET ORAL THREE TIMES A DAY
Refills: 0 | Status: DISCONTINUED | OUTPATIENT
Start: 2020-01-01 | End: 2020-01-01

## 2020-01-01 RX ORDER — BUMETANIDE 0.25 MG/ML
1 INJECTION INTRAMUSCULAR; INTRAVENOUS
Qty: 20 | Refills: 0 | Status: DISCONTINUED | OUTPATIENT
Start: 2020-01-01 | End: 2020-01-01

## 2020-01-01 RX ORDER — SODIUM CHLORIDE 9 MG/ML
1000 INJECTION INTRAMUSCULAR; INTRAVENOUS; SUBCUTANEOUS
Refills: 0 | Status: DISCONTINUED | OUTPATIENT
Start: 2020-01-01 | End: 2020-01-01

## 2020-01-01 RX ORDER — NOREPINEPHRINE BITARTRATE/D5W 8 MG/250ML
0.05 PLASTIC BAG, INJECTION (ML) INTRAVENOUS
Qty: 16 | Refills: 0 | Status: DISCONTINUED | OUTPATIENT
Start: 2020-01-01 | End: 2020-01-01

## 2020-01-01 RX ORDER — DEXMEDETOMIDINE HYDROCHLORIDE IN 0.9% SODIUM CHLORIDE 4 UG/ML
0.2 INJECTION INTRAVENOUS
Qty: 200 | Refills: 0 | Status: DISCONTINUED | OUTPATIENT
Start: 2020-01-01 | End: 2020-01-01

## 2020-01-01 RX ORDER — CHLORHEXIDINE GLUCONATE 213 G/1000ML
15 SOLUTION TOPICAL EVERY 12 HOURS
Refills: 0 | Status: DISCONTINUED | OUTPATIENT
Start: 2020-01-01 | End: 2020-01-01

## 2020-01-01 RX ORDER — MULTIVIT-MIN/FERROUS GLUCONATE 9 MG/15 ML
1 LIQUID (ML) ORAL DAILY
Refills: 0 | Status: DISCONTINUED | OUTPATIENT
Start: 2020-01-01 | End: 2020-01-01

## 2020-01-01 RX ORDER — BUMETANIDE 0.25 MG/ML
5 INJECTION INTRAMUSCULAR; INTRAVENOUS
Qty: 20 | Refills: 0 | Status: DISCONTINUED | OUTPATIENT
Start: 2020-01-01 | End: 2020-01-01

## 2020-01-01 RX ORDER — ENOXAPARIN SODIUM 100 MG/ML
40 INJECTION SUBCUTANEOUS AT BEDTIME
Refills: 0 | Status: DISCONTINUED | OUTPATIENT
Start: 2020-01-01 | End: 2020-01-01

## 2020-01-01 RX ORDER — ROBINUL 0.2 MG/ML
0.4 INJECTION INTRAMUSCULAR; INTRAVENOUS ONCE
Refills: 0 | Status: COMPLETED | OUTPATIENT
Start: 2020-01-01 | End: 2020-01-01

## 2020-01-01 RX ORDER — PHENYLEPHRINE HYDROCHLORIDE 10 MG/ML
1 INJECTION INTRAVENOUS
Qty: 160 | Refills: 0 | Status: DISCONTINUED | OUTPATIENT
Start: 2020-01-01 | End: 2020-01-01

## 2020-01-01 RX ORDER — LINEZOLID 600 MG/300ML
600 INJECTION, SOLUTION INTRAVENOUS EVERY 12 HOURS
Refills: 0 | Status: DISCONTINUED | OUTPATIENT
Start: 2020-01-01 | End: 2020-01-01

## 2020-01-01 RX ORDER — MAGNESIUM OXIDE 400 MG ORAL TABLET 241.3 MG
400 TABLET ORAL
Refills: 0 | Status: DISCONTINUED | OUTPATIENT
Start: 2020-01-01 | End: 2020-01-01

## 2020-01-01 RX ORDER — HYDROCORTISONE 20 MG
50 TABLET ORAL EVERY 6 HOURS
Refills: 0 | Status: DISCONTINUED | OUTPATIENT
Start: 2020-01-01 | End: 2020-01-01

## 2020-01-01 RX ORDER — CEFAZOLIN SODIUM 1 G
1000 VIAL (EA) INJECTION EVERY 8 HOURS
Refills: 0 | Status: DISCONTINUED | OUTPATIENT
Start: 2020-01-01 | End: 2020-01-01

## 2020-01-01 RX ORDER — DEXTROSE 50 % IN WATER 50 %
50 SYRINGE (ML) INTRAVENOUS ONCE
Refills: 0 | Status: COMPLETED | OUTPATIENT
Start: 2020-01-01 | End: 2020-01-01

## 2020-01-01 RX ORDER — SODIUM HYPOCHLORITE 0.125 %
1 SOLUTION, NON-ORAL MISCELLANEOUS
Refills: 0 | Status: DISCONTINUED | OUTPATIENT
Start: 2020-01-01 | End: 2020-01-01

## 2020-01-01 RX ORDER — DIPHENHYDRAMINE HCL 50 MG
25 CAPSULE ORAL ONCE
Refills: 0 | Status: COMPLETED | OUTPATIENT
Start: 2020-01-01 | End: 2020-01-01

## 2020-01-01 RX ORDER — MIDAZOLAM HYDROCHLORIDE 1 MG/ML
1 INJECTION, SOLUTION INTRAMUSCULAR; INTRAVENOUS ONCE
Refills: 0 | Status: DISCONTINUED | OUTPATIENT
Start: 2020-01-01 | End: 2020-01-01

## 2020-01-01 RX ORDER — AMPICILLIN SODIUM AND SULBACTAM SODIUM 250; 125 MG/ML; MG/ML
3 INJECTION, POWDER, FOR SUSPENSION INTRAMUSCULAR; INTRAVENOUS ONCE
Refills: 0 | Status: COMPLETED | OUTPATIENT
Start: 2020-01-01 | End: 2020-01-01

## 2020-01-01 RX ORDER — ONDANSETRON 8 MG/1
4 TABLET, FILM COATED ORAL ONCE
Refills: 0 | Status: COMPLETED | OUTPATIENT
Start: 2020-01-01 | End: 2020-01-01

## 2020-01-01 RX ORDER — SODIUM BICARBONATE 1 MEQ/ML
325 SYRINGE (ML) INTRAVENOUS THREE TIMES A DAY
Refills: 0 | Status: DISCONTINUED | OUTPATIENT
Start: 2020-01-01 | End: 2020-01-01

## 2020-01-01 RX ORDER — FUROSEMIDE 40 MG
40 TABLET ORAL ONCE
Refills: 0 | Status: COMPLETED | OUTPATIENT
Start: 2020-01-01 | End: 2020-01-01

## 2020-01-01 RX ORDER — SODIUM BICARBONATE 1 MEQ/ML
50 SYRINGE (ML) INTRAVENOUS ONCE
Refills: 0 | Status: COMPLETED | OUTPATIENT
Start: 2020-01-01 | End: 2020-01-01

## 2020-01-01 RX ORDER — HYDROCORTISONE 20 MG
100 TABLET ORAL EVERY 8 HOURS
Refills: 0 | Status: DISCONTINUED | OUTPATIENT
Start: 2020-01-01 | End: 2020-01-01

## 2020-01-01 RX ORDER — FUROSEMIDE 40 MG
40 TABLET ORAL
Refills: 0 | Status: DISCONTINUED | OUTPATIENT
Start: 2020-01-01 | End: 2020-01-01

## 2020-01-01 RX ORDER — AMPICILLIN SODIUM AND SULBACTAM SODIUM 250; 125 MG/ML; MG/ML
INJECTION, POWDER, FOR SUSPENSION INTRAMUSCULAR; INTRAVENOUS
Refills: 0 | Status: DISCONTINUED | OUTPATIENT
Start: 2020-01-01 | End: 2020-01-01

## 2020-01-01 RX ORDER — DOBUTAMINE HCL 250MG/20ML
25 VIAL (ML) INTRAVENOUS
Qty: 1000 | Refills: 0 | Status: DISCONTINUED | OUTPATIENT
Start: 2020-01-01 | End: 2020-01-01

## 2020-01-01 RX ORDER — MIDODRINE HYDROCHLORIDE 2.5 MG/1
10 TABLET ORAL THREE TIMES A DAY
Refills: 0 | Status: DISCONTINUED | OUTPATIENT
Start: 2020-01-01 | End: 2020-01-01

## 2020-01-01 RX ORDER — SODIUM CHLORIDE 9 MG/ML
1000 INJECTION INTRAMUSCULAR; INTRAVENOUS; SUBCUTANEOUS ONCE
Refills: 0 | Status: COMPLETED | OUTPATIENT
Start: 2020-01-01 | End: 2020-01-01

## 2020-01-01 RX ORDER — SODIUM CHLORIDE 9 MG/ML
250 INJECTION, SOLUTION INTRAVENOUS
Refills: 0 | Status: DISCONTINUED | OUTPATIENT
Start: 2020-01-01 | End: 2020-01-01

## 2020-01-01 RX ORDER — FUROSEMIDE 40 MG
20 TABLET ORAL DAILY
Refills: 0 | Status: DISCONTINUED | OUTPATIENT
Start: 2020-01-01 | End: 2020-01-01

## 2020-01-01 RX ORDER — VANCOMYCIN HCL 1 G
1000 VIAL (EA) INTRAVENOUS EVERY 12 HOURS
Refills: 0 | Status: DISCONTINUED | OUTPATIENT
Start: 2020-01-01 | End: 2020-01-01

## 2020-01-01 RX ADMIN — ENOXAPARIN SODIUM 40 MILLIGRAM(S): 100 INJECTION SUBCUTANEOUS at 21:22

## 2020-01-01 RX ADMIN — Medication 1 APPLICATION(S): at 05:36

## 2020-01-01 RX ADMIN — SODIUM CHLORIDE 100 MILLILITER(S): 9 INJECTION, SOLUTION INTRAVENOUS at 17:45

## 2020-01-01 RX ADMIN — SODIUM CHLORIDE 75 MILLILITER(S): 9 INJECTION INTRAMUSCULAR; INTRAVENOUS; SUBCUTANEOUS at 17:22

## 2020-01-01 RX ADMIN — MAGNESIUM OXIDE 400 MG ORAL TABLET 400 MILLIGRAM(S): 241.3 TABLET ORAL at 08:38

## 2020-01-01 RX ADMIN — CHLORHEXIDINE GLUCONATE 1 APPLICATION(S): 213 SOLUTION TOPICAL at 05:29

## 2020-01-01 RX ADMIN — SODIUM CHLORIDE 75 MILLILITER(S): 9 INJECTION INTRAMUSCULAR; INTRAVENOUS; SUBCUTANEOUS at 05:10

## 2020-01-01 RX ADMIN — LINEZOLID 300 MILLIGRAM(S): 600 INJECTION, SOLUTION INTRAVENOUS at 17:35

## 2020-01-01 RX ADMIN — Medication 650 MILLIGRAM(S): at 05:45

## 2020-01-01 RX ADMIN — MIDODRINE HYDROCHLORIDE 10 MILLIGRAM(S): 2.5 TABLET ORAL at 05:49

## 2020-01-01 RX ADMIN — SODIUM CHLORIDE 100 MILLILITER(S): 9 INJECTION, SOLUTION INTRAVENOUS at 14:11

## 2020-01-01 RX ADMIN — MIDODRINE HYDROCHLORIDE 10 MILLIGRAM(S): 2.5 TABLET ORAL at 17:59

## 2020-01-01 RX ADMIN — OXYCODONE AND ACETAMINOPHEN 1 TABLET(S): 5; 325 TABLET ORAL at 18:08

## 2020-01-01 RX ADMIN — Medication 40 MILLIEQUIVALENT(S): at 17:53

## 2020-01-01 RX ADMIN — OXYCODONE AND ACETAMINOPHEN 1 TABLET(S): 5; 325 TABLET ORAL at 06:28

## 2020-01-01 RX ADMIN — Medication 1 APPLICATION(S): at 06:02

## 2020-01-01 RX ADMIN — Medication 325 MILLIGRAM(S): at 13:09

## 2020-01-01 RX ADMIN — Medication 1 TABLET(S): at 12:16

## 2020-01-01 RX ADMIN — Medication 1 APPLICATION(S): at 18:12

## 2020-01-01 RX ADMIN — Medication 1 APPLICATION(S): at 16:50

## 2020-01-01 RX ADMIN — Medication 1 APPLICATION(S): at 17:48

## 2020-01-01 RX ADMIN — Medication 1 APPLICATION(S): at 05:59

## 2020-01-01 RX ADMIN — Medication 3.71 MICROGRAM(S)/KG/MIN: at 05:45

## 2020-01-01 RX ADMIN — Medication 1 APPLICATION(S): at 05:45

## 2020-01-01 RX ADMIN — MIDODRINE HYDROCHLORIDE 10 MILLIGRAM(S): 2.5 TABLET ORAL at 13:53

## 2020-01-01 RX ADMIN — Medication 50 MILLIEQUIVALENT(S): at 11:42

## 2020-01-01 RX ADMIN — Medication 1 APPLICATION(S): at 17:23

## 2020-01-01 RX ADMIN — MIDODRINE HYDROCHLORIDE 10 MILLIGRAM(S): 2.5 TABLET ORAL at 17:22

## 2020-01-01 RX ADMIN — VASOPRESSIN 2.4 UNIT(S)/MIN: 20 INJECTION INTRAVENOUS at 10:55

## 2020-01-01 RX ADMIN — ENOXAPARIN SODIUM 40 MILLIGRAM(S): 100 INJECTION SUBCUTANEOUS at 23:27

## 2020-01-01 RX ADMIN — Medication 100 MILLIGRAM(S): at 05:09

## 2020-01-01 RX ADMIN — Medication 1 APPLICATION(S): at 06:05

## 2020-01-01 RX ADMIN — Medication 250 MILLIGRAM(S): at 11:40

## 2020-01-01 RX ADMIN — MAGNESIUM OXIDE 400 MG ORAL TABLET 400 MILLIGRAM(S): 241.3 TABLET ORAL at 09:20

## 2020-01-01 RX ADMIN — Medication 1 APPLICATION(S): at 17:59

## 2020-01-01 RX ADMIN — MIDODRINE HYDROCHLORIDE 10 MILLIGRAM(S): 2.5 TABLET ORAL at 12:06

## 2020-01-01 RX ADMIN — MAGNESIUM OXIDE 400 MG ORAL TABLET 400 MILLIGRAM(S): 241.3 TABLET ORAL at 18:11

## 2020-01-01 RX ADMIN — SODIUM CHLORIDE 100 MILLILITER(S): 9 INJECTION, SOLUTION INTRAVENOUS at 14:31

## 2020-01-01 RX ADMIN — CHLORHEXIDINE GLUCONATE 1 APPLICATION(S): 213 SOLUTION TOPICAL at 05:39

## 2020-01-01 RX ADMIN — MILRINONE LACTATE 2.97 MICROGRAM(S)/KG/MIN: 1 INJECTION, SOLUTION INTRAVENOUS at 12:30

## 2020-01-01 RX ADMIN — ENOXAPARIN SODIUM 40 MILLIGRAM(S): 100 INJECTION SUBCUTANEOUS at 21:29

## 2020-01-01 RX ADMIN — CHLORHEXIDINE GLUCONATE 1 APPLICATION(S): 213 SOLUTION TOPICAL at 05:14

## 2020-01-01 RX ADMIN — MIDODRINE HYDROCHLORIDE 10 MILLIGRAM(S): 2.5 TABLET ORAL at 06:22

## 2020-01-01 RX ADMIN — MIDODRINE HYDROCHLORIDE 10 MILLIGRAM(S): 2.5 TABLET ORAL at 06:02

## 2020-01-01 RX ADMIN — Medication 100 MILLIGRAM(S): at 05:49

## 2020-01-01 RX ADMIN — AMPICILLIN SODIUM AND SULBACTAM SODIUM 200 GRAM(S): 250; 125 INJECTION, POWDER, FOR SUSPENSION INTRAMUSCULAR; INTRAVENOUS at 19:37

## 2020-01-01 RX ADMIN — Medication 25 MILLIGRAM(S): at 19:57

## 2020-01-01 RX ADMIN — Medication 1000 MILLILITER(S): at 05:45

## 2020-01-01 RX ADMIN — SODIUM CHLORIDE 500 MILLILITER(S): 9 INJECTION INTRAMUSCULAR; INTRAVENOUS; SUBCUTANEOUS at 09:20

## 2020-01-01 RX ADMIN — CHLORHEXIDINE GLUCONATE 15 MILLILITER(S): 213 SOLUTION TOPICAL at 05:59

## 2020-01-01 RX ADMIN — VASOPRESSIN 2.4 UNIT(S)/MIN: 20 INJECTION INTRAVENOUS at 21:34

## 2020-01-01 RX ADMIN — PANTOPRAZOLE SODIUM 40 MILLIGRAM(S): 20 TABLET, DELAYED RELEASE ORAL at 05:45

## 2020-01-01 RX ADMIN — Medication 40 MILLIGRAM(S): at 17:36

## 2020-01-01 RX ADMIN — Medication 325 MILLIGRAM(S): at 21:32

## 2020-01-01 RX ADMIN — CHLORHEXIDINE GLUCONATE 1 APPLICATION(S): 213 SOLUTION TOPICAL at 05:49

## 2020-01-01 RX ADMIN — Medication 1 APPLICATION(S): at 07:21

## 2020-01-01 RX ADMIN — SODIUM CHLORIDE 1000 MILLILITER(S): 9 INJECTION INTRAMUSCULAR; INTRAVENOUS; SUBCUTANEOUS at 13:40

## 2020-01-01 RX ADMIN — SODIUM CHLORIDE 75 MILLILITER(S): 9 INJECTION INTRAMUSCULAR; INTRAVENOUS; SUBCUTANEOUS at 22:01

## 2020-01-01 RX ADMIN — CHLORHEXIDINE GLUCONATE 1 APPLICATION(S): 213 SOLUTION TOPICAL at 05:35

## 2020-01-01 RX ADMIN — OCTREOTIDE ACETATE 100 MICROGRAM(S): 200 INJECTION, SOLUTION INTRAVENOUS; SUBCUTANEOUS at 22:24

## 2020-01-01 RX ADMIN — Medication 1 APPLICATION(S): at 17:49

## 2020-01-01 RX ADMIN — Medication 100 MILLIGRAM(S): at 17:50

## 2020-01-01 RX ADMIN — Medication 40 MILLIEQUIVALENT(S): at 21:17

## 2020-01-01 RX ADMIN — AMPICILLIN SODIUM AND SULBACTAM SODIUM 200 GRAM(S): 250; 125 INJECTION, POWDER, FOR SUSPENSION INTRAMUSCULAR; INTRAVENOUS at 02:42

## 2020-01-01 RX ADMIN — MAGNESIUM OXIDE 400 MG ORAL TABLET 400 MILLIGRAM(S): 241.3 TABLET ORAL at 17:52

## 2020-01-01 RX ADMIN — Medication 650 MILLIGRAM(S): at 21:21

## 2020-01-01 RX ADMIN — Medication 1 APPLICATION(S): at 06:04

## 2020-01-01 RX ADMIN — Medication 100 MILLIGRAM(S): at 17:59

## 2020-01-01 RX ADMIN — MIDODRINE HYDROCHLORIDE 10 MILLIGRAM(S): 2.5 TABLET ORAL at 17:46

## 2020-01-01 RX ADMIN — Medication 100 MILLIGRAM(S): at 17:30

## 2020-01-01 RX ADMIN — FENTANYL CITRATE 3.96 MICROGRAM(S)/KG/HR: 50 INJECTION INTRAVENOUS at 04:26

## 2020-01-01 RX ADMIN — AMPICILLIN SODIUM AND SULBACTAM SODIUM 200 GRAM(S): 250; 125 INJECTION, POWDER, FOR SUSPENSION INTRAMUSCULAR; INTRAVENOUS at 06:46

## 2020-01-01 RX ADMIN — MAGNESIUM OXIDE 400 MG ORAL TABLET 400 MILLIGRAM(S): 241.3 TABLET ORAL at 17:37

## 2020-01-01 RX ADMIN — LINEZOLID 300 MILLIGRAM(S): 600 INJECTION, SOLUTION INTRAVENOUS at 17:38

## 2020-01-01 RX ADMIN — OXYCODONE AND ACETAMINOPHEN 1 TABLET(S): 5; 325 TABLET ORAL at 05:49

## 2020-01-01 RX ADMIN — BUMETANIDE 10 MG/HR: 0.25 INJECTION INTRAMUSCULAR; INTRAVENOUS at 21:33

## 2020-01-01 RX ADMIN — SODIUM CHLORIDE 100 MILLILITER(S): 9 INJECTION, SOLUTION INTRAVENOUS at 06:00

## 2020-01-01 RX ADMIN — Medication 50 MILLILITER(S): at 12:00

## 2020-01-01 RX ADMIN — LINEZOLID 300 MILLIGRAM(S): 600 INJECTION, SOLUTION INTRAVENOUS at 18:04

## 2020-01-01 RX ADMIN — OCTREOTIDE ACETATE 500 MICROGRAM(S): 200 INJECTION, SOLUTION INTRAVENOUS; SUBCUTANEOUS at 13:30

## 2020-01-01 RX ADMIN — MIDODRINE HYDROCHLORIDE 10 MILLIGRAM(S): 2.5 TABLET ORAL at 17:50

## 2020-01-01 RX ADMIN — Medication 40 MILLIGRAM(S): at 05:29

## 2020-01-01 RX ADMIN — Medication 325 MILLIGRAM(S): at 21:22

## 2020-01-01 RX ADMIN — ENOXAPARIN SODIUM 40 MILLIGRAM(S): 100 INJECTION SUBCUTANEOUS at 21:10

## 2020-01-01 RX ADMIN — Medication 2 MILLIGRAM(S): at 13:09

## 2020-01-01 RX ADMIN — FENTANYL CITRATE 3.96 MICROGRAM(S)/KG/HR: 50 INJECTION INTRAVENOUS at 13:22

## 2020-01-01 RX ADMIN — CHLORHEXIDINE GLUCONATE 1 APPLICATION(S): 213 SOLUTION TOPICAL at 06:22

## 2020-01-01 RX ADMIN — Medication 100 MILLIGRAM(S): at 13:30

## 2020-01-01 RX ADMIN — CHLORHEXIDINE GLUCONATE 1 APPLICATION(S): 213 SOLUTION TOPICAL at 06:00

## 2020-01-01 RX ADMIN — MIDODRINE HYDROCHLORIDE 10 MILLIGRAM(S): 2.5 TABLET ORAL at 11:57

## 2020-01-01 RX ADMIN — BUMETANIDE 2 MILLIGRAM(S): 0.25 INJECTION INTRAMUSCULAR; INTRAVENOUS at 17:45

## 2020-01-01 RX ADMIN — SODIUM CHLORIDE 100 MILLILITER(S): 9 INJECTION, SOLUTION INTRAVENOUS at 02:01

## 2020-01-01 RX ADMIN — MAGNESIUM OXIDE 400 MG ORAL TABLET 400 MILLIGRAM(S): 241.3 TABLET ORAL at 17:30

## 2020-01-01 RX ADMIN — MAGNESIUM OXIDE 400 MG ORAL TABLET 400 MILLIGRAM(S): 241.3 TABLET ORAL at 17:22

## 2020-01-01 RX ADMIN — CHLORHEXIDINE GLUCONATE 15 MILLILITER(S): 213 SOLUTION TOPICAL at 06:46

## 2020-01-01 RX ADMIN — Medication 100 MILLIGRAM(S): at 05:35

## 2020-01-01 RX ADMIN — OXYCODONE AND ACETAMINOPHEN 1 TABLET(S): 5; 325 TABLET ORAL at 17:32

## 2020-01-01 RX ADMIN — CHLORHEXIDINE GLUCONATE 15 MILLILITER(S): 213 SOLUTION TOPICAL at 05:45

## 2020-01-01 RX ADMIN — LINEZOLID 300 MILLIGRAM(S): 600 INJECTION, SOLUTION INTRAVENOUS at 06:04

## 2020-01-01 RX ADMIN — SODIUM CHLORIDE 70 MILLILITER(S): 9 INJECTION, SOLUTION INTRAVENOUS at 05:12

## 2020-01-01 RX ADMIN — BUMETANIDE 2 MILLIGRAM(S): 0.25 INJECTION INTRAMUSCULAR; INTRAVENOUS at 12:40

## 2020-01-01 RX ADMIN — CHLORHEXIDINE GLUCONATE 15 MILLILITER(S): 213 SOLUTION TOPICAL at 17:46

## 2020-01-01 RX ADMIN — Medication 1 APPLICATION(S): at 06:22

## 2020-01-01 RX ADMIN — OXYCODONE AND ACETAMINOPHEN 1 TABLET(S): 5; 325 TABLET ORAL at 05:35

## 2020-01-01 RX ADMIN — Medication 1 TABLET(S): at 11:43

## 2020-01-01 RX ADMIN — MIDODRINE HYDROCHLORIDE 10 MILLIGRAM(S): 2.5 TABLET ORAL at 05:38

## 2020-01-01 RX ADMIN — MIDODRINE HYDROCHLORIDE 5 MILLIGRAM(S): 2.5 TABLET ORAL at 17:37

## 2020-01-01 RX ADMIN — Medication 3.71 MICROGRAM(S)/KG/MIN: at 06:12

## 2020-01-01 RX ADMIN — Medication 40 MILLIGRAM(S): at 06:05

## 2020-01-01 RX ADMIN — CHLORHEXIDINE GLUCONATE 15 MILLILITER(S): 213 SOLUTION TOPICAL at 17:48

## 2020-01-01 RX ADMIN — MIDODRINE HYDROCHLORIDE 5 MILLIGRAM(S): 2.5 TABLET ORAL at 06:03

## 2020-01-01 RX ADMIN — AMPICILLIN SODIUM AND SULBACTAM SODIUM 200 GRAM(S): 250; 125 INJECTION, POWDER, FOR SUSPENSION INTRAMUSCULAR; INTRAVENOUS at 13:09

## 2020-01-01 RX ADMIN — Medication 40 MILLIGRAM(S): at 19:02

## 2020-01-01 RX ADMIN — LINEZOLID 300 MILLIGRAM(S): 600 INJECTION, SOLUTION INTRAVENOUS at 06:03

## 2020-01-01 RX ADMIN — Medication 2 MILLIGRAM(S): at 21:32

## 2020-01-01 RX ADMIN — Medication 100 MILLIGRAM(S): at 01:52

## 2020-01-01 RX ADMIN — SODIUM CHLORIDE 1000 MILLILITER(S): 9 INJECTION, SOLUTION INTRAVENOUS at 04:32

## 2020-01-01 RX ADMIN — AMPICILLIN SODIUM AND SULBACTAM SODIUM 200 GRAM(S): 250; 125 INJECTION, POWDER, FOR SUSPENSION INTRAMUSCULAR; INTRAVENOUS at 15:47

## 2020-01-01 RX ADMIN — FENTANYL CITRATE 3.96 MICROGRAM(S)/KG/HR: 50 INJECTION INTRAVENOUS at 13:35

## 2020-01-01 RX ADMIN — AMPICILLIN SODIUM AND SULBACTAM SODIUM 200 GRAM(S): 250; 125 INJECTION, POWDER, FOR SUSPENSION INTRAMUSCULAR; INTRAVENOUS at 07:58

## 2020-01-01 RX ADMIN — Medication 2.97 MICROGRAM(S)/KG/MIN: at 13:30

## 2020-01-01 RX ADMIN — SODIUM CHLORIDE 250 MILLILITER(S): 9 INJECTION INTRAMUSCULAR; INTRAVENOUS; SUBCUTANEOUS at 21:35

## 2020-01-01 RX ADMIN — OXYCODONE AND ACETAMINOPHEN 1 TABLET(S): 5; 325 TABLET ORAL at 20:19

## 2020-01-01 RX ADMIN — Medication 2 MILLIGRAM(S): at 08:46

## 2020-01-01 RX ADMIN — Medication 2 MILLIGRAM(S): at 17:29

## 2020-01-01 RX ADMIN — Medication 1 APPLICATION(S): at 17:46

## 2020-01-01 RX ADMIN — Medication 1 APPLICATION(S): at 16:51

## 2020-01-01 RX ADMIN — FENTANYL CITRATE 3.96 MICROGRAM(S)/KG/HR: 50 INJECTION INTRAVENOUS at 00:07

## 2020-01-01 RX ADMIN — CHLORHEXIDINE GLUCONATE 1 APPLICATION(S): 213 SOLUTION TOPICAL at 05:45

## 2020-01-01 RX ADMIN — Medication 2 MILLIGRAM(S): at 14:22

## 2020-01-01 RX ADMIN — AMPICILLIN SODIUM AND SULBACTAM SODIUM 200 GRAM(S): 250; 125 INJECTION, POWDER, FOR SUSPENSION INTRAMUSCULAR; INTRAVENOUS at 02:30

## 2020-01-01 RX ADMIN — ENOXAPARIN SODIUM 40 MILLIGRAM(S): 100 INJECTION SUBCUTANEOUS at 21:14

## 2020-01-01 RX ADMIN — AMPICILLIN SODIUM AND SULBACTAM SODIUM 200 GRAM(S): 250; 125 INJECTION, POWDER, FOR SUSPENSION INTRAMUSCULAR; INTRAVENOUS at 08:31

## 2020-01-01 RX ADMIN — Medication 3.71 MICROGRAM(S)/KG/MIN: at 21:33

## 2020-01-01 RX ADMIN — AMPICILLIN SODIUM AND SULBACTAM SODIUM 200 GRAM(S): 250; 125 INJECTION, POWDER, FOR SUSPENSION INTRAMUSCULAR; INTRAVENOUS at 20:17

## 2020-01-01 RX ADMIN — MORPHINE SULFATE 10 MILLIGRAM(S): 50 CAPSULE, EXTENDED RELEASE ORAL at 16:48

## 2020-01-01 RX ADMIN — Medication 1 APPLICATION(S): at 05:10

## 2020-01-01 RX ADMIN — Medication 2 MILLIGRAM(S): at 09:27

## 2020-01-01 RX ADMIN — Medication 1 APPLICATION(S): at 05:35

## 2020-01-01 RX ADMIN — MIDODRINE HYDROCHLORIDE 10 MILLIGRAM(S): 2.5 TABLET ORAL at 18:12

## 2020-01-01 RX ADMIN — MIDAZOLAM HYDROCHLORIDE 1 MILLIGRAM(S): 1 INJECTION, SOLUTION INTRAMUSCULAR; INTRAVENOUS at 12:30

## 2020-01-01 RX ADMIN — Medication 40 MILLIGRAM(S): at 05:53

## 2020-01-01 RX ADMIN — PANTOPRAZOLE SODIUM 10 MG/HR: 20 TABLET, DELAYED RELEASE ORAL at 06:51

## 2020-01-01 RX ADMIN — CHLORHEXIDINE GLUCONATE 1 APPLICATION(S): 213 SOLUTION TOPICAL at 06:04

## 2020-01-01 RX ADMIN — LINEZOLID 300 MILLIGRAM(S): 600 INJECTION, SOLUTION INTRAVENOUS at 05:38

## 2020-01-01 RX ADMIN — FENTANYL CITRATE 3.96 MICROGRAM(S)/KG/HR: 50 INJECTION INTRAVENOUS at 22:28

## 2020-01-01 RX ADMIN — OXYCODONE AND ACETAMINOPHEN 1 TABLET(S): 5; 325 TABLET ORAL at 07:46

## 2020-01-01 RX ADMIN — OCTREOTIDE ACETATE 100 MICROGRAM(S): 200 INJECTION, SOLUTION INTRAVENOUS; SUBCUTANEOUS at 05:49

## 2020-01-01 RX ADMIN — LINEZOLID 300 MILLIGRAM(S): 600 INJECTION, SOLUTION INTRAVENOUS at 05:29

## 2020-01-01 RX ADMIN — Medication 100 MILLIGRAM(S): at 06:03

## 2020-01-01 RX ADMIN — LINEZOLID 300 MILLIGRAM(S): 600 INJECTION, SOLUTION INTRAVENOUS at 18:13

## 2020-01-01 RX ADMIN — Medication 2.97 MICROGRAM(S)/KG/MIN: at 06:01

## 2020-01-01 RX ADMIN — OXYCODONE AND ACETAMINOPHEN 1 TABLET(S): 5; 325 TABLET ORAL at 10:30

## 2020-01-01 RX ADMIN — Medication 325 MILLIGRAM(S): at 14:21

## 2020-01-01 RX ADMIN — SODIUM CHLORIDE 1000 MILLILITER(S): 9 INJECTION, SOLUTION INTRAVENOUS at 06:16

## 2020-01-01 RX ADMIN — MAGNESIUM OXIDE 400 MG ORAL TABLET 400 MILLIGRAM(S): 241.3 TABLET ORAL at 11:16

## 2020-01-01 RX ADMIN — CHLORHEXIDINE GLUCONATE 1 APPLICATION(S): 213 SOLUTION TOPICAL at 06:05

## 2020-01-01 RX ADMIN — PANTOPRAZOLE SODIUM 40 MILLIGRAM(S): 20 TABLET, DELAYED RELEASE ORAL at 17:48

## 2020-01-01 RX ADMIN — OXYCODONE AND ACETAMINOPHEN 1 TABLET(S): 5; 325 TABLET ORAL at 19:40

## 2020-01-01 RX ADMIN — CHLORHEXIDINE GLUCONATE 1 APPLICATION(S): 213 SOLUTION TOPICAL at 06:46

## 2020-01-01 RX ADMIN — Medication 2 MILLIGRAM(S): at 21:22

## 2020-01-01 RX ADMIN — OXYCODONE AND ACETAMINOPHEN 1 TABLET(S): 5; 325 TABLET ORAL at 12:56

## 2020-01-01 RX ADMIN — PHENYLEPHRINE HYDROCHLORIDE 1.48 MICROGRAM(S)/KG/MIN: 10 INJECTION INTRAVENOUS at 10:54

## 2020-01-01 RX ADMIN — SODIUM CHLORIDE 1000 MILLILITER(S): 9 INJECTION, SOLUTION INTRAVENOUS at 12:50

## 2020-01-01 RX ADMIN — OXYCODONE AND ACETAMINOPHEN 1 TABLET(S): 5; 325 TABLET ORAL at 07:00

## 2020-01-01 RX ADMIN — Medication 3.71 MICROGRAM(S)/KG/MIN: at 06:01

## 2020-01-01 RX ADMIN — ENOXAPARIN SODIUM 40 MILLIGRAM(S): 100 INJECTION SUBCUTANEOUS at 21:47

## 2020-01-01 RX ADMIN — OXYCODONE AND ACETAMINOPHEN 1 TABLET(S): 5; 325 TABLET ORAL at 05:10

## 2020-01-01 RX ADMIN — MIDODRINE HYDROCHLORIDE 5 MILLIGRAM(S): 2.5 TABLET ORAL at 11:51

## 2020-01-01 RX ADMIN — OXYCODONE AND ACETAMINOPHEN 1 TABLET(S): 5; 325 TABLET ORAL at 08:15

## 2020-01-01 RX ADMIN — FENTANYL CITRATE 50 MICROGRAM(S): 50 INJECTION INTRAVENOUS at 04:30

## 2020-01-01 RX ADMIN — Medication 7.42 MICROGRAM(S)/KG/MIN: at 23:25

## 2020-01-01 RX ADMIN — Medication 100 MILLIGRAM(S): at 10:53

## 2020-01-01 RX ADMIN — AMPICILLIN SODIUM AND SULBACTAM SODIUM 200 GRAM(S): 250; 125 INJECTION, POWDER, FOR SUSPENSION INTRAMUSCULAR; INTRAVENOUS at 13:21

## 2020-01-01 RX ADMIN — MAGNESIUM OXIDE 400 MG ORAL TABLET 400 MILLIGRAM(S): 241.3 TABLET ORAL at 09:09

## 2020-01-01 RX ADMIN — ONDANSETRON 4 MILLIGRAM(S): 8 TABLET, FILM COATED ORAL at 00:20

## 2020-01-01 RX ADMIN — MAGNESIUM OXIDE 400 MG ORAL TABLET 400 MILLIGRAM(S): 241.3 TABLET ORAL at 07:47

## 2020-01-01 RX ADMIN — MIDODRINE HYDROCHLORIDE 10 MILLIGRAM(S): 2.5 TABLET ORAL at 11:43

## 2020-01-01 RX ADMIN — MIDODRINE HYDROCHLORIDE 10 MILLIGRAM(S): 2.5 TABLET ORAL at 05:35

## 2020-01-01 RX ADMIN — MIDODRINE HYDROCHLORIDE 10 MILLIGRAM(S): 2.5 TABLET ORAL at 12:16

## 2020-01-01 RX ADMIN — SODIUM CHLORIDE 75 MILLILITER(S): 9 INJECTION, SOLUTION INTRAVENOUS at 10:54

## 2020-01-01 RX ADMIN — Medication 2 MILLIGRAM(S): at 06:03

## 2020-01-01 RX ADMIN — Medication 100 MILLIGRAM(S): at 17:22

## 2020-01-01 RX ADMIN — MAGNESIUM OXIDE 400 MG ORAL TABLET 400 MILLIGRAM(S): 241.3 TABLET ORAL at 08:02

## 2020-01-01 RX ADMIN — ENOXAPARIN SODIUM 40 MILLIGRAM(S): 100 INJECTION SUBCUTANEOUS at 21:32

## 2020-01-01 RX ADMIN — ENOXAPARIN SODIUM 40 MILLIGRAM(S): 100 INJECTION SUBCUTANEOUS at 22:01

## 2020-01-01 RX ADMIN — Medication 1 APPLICATION(S): at 17:30

## 2020-01-01 RX ADMIN — Medication 2.97 MICROGRAM(S)/KG/MIN: at 06:10

## 2020-01-01 RX ADMIN — ENOXAPARIN SODIUM 40 MILLIGRAM(S): 100 INJECTION SUBCUTANEOUS at 21:18

## 2020-01-01 RX ADMIN — Medication 325 MILLIGRAM(S): at 06:02

## 2020-01-01 RX ADMIN — SODIUM CHLORIDE 75 MILLILITER(S): 9 INJECTION INTRAMUSCULAR; INTRAVENOUS; SUBCUTANEOUS at 04:25

## 2020-01-01 RX ADMIN — MIDODRINE HYDROCHLORIDE 10 MILLIGRAM(S): 2.5 TABLET ORAL at 11:19

## 2020-01-01 RX ADMIN — ENOXAPARIN SODIUM 40 MILLIGRAM(S): 100 INJECTION SUBCUTANEOUS at 22:24

## 2020-01-01 RX ADMIN — SODIUM CHLORIDE 500 MILLILITER(S): 9 INJECTION INTRAMUSCULAR; INTRAVENOUS; SUBCUTANEOUS at 06:26

## 2020-01-01 RX ADMIN — Medication 29.66 MICROGRAM(S)/KG/MIN: at 11:20

## 2020-01-01 RX ADMIN — MIDODRINE HYDROCHLORIDE 10 MILLIGRAM(S): 2.5 TABLET ORAL at 17:30

## 2020-01-01 RX ADMIN — MAGNESIUM OXIDE 400 MG ORAL TABLET 400 MILLIGRAM(S): 241.3 TABLET ORAL at 17:50

## 2020-01-01 RX ADMIN — Medication 1 APPLICATION(S): at 05:49

## 2020-01-01 RX ADMIN — MIDODRINE HYDROCHLORIDE 10 MILLIGRAM(S): 2.5 TABLET ORAL at 04:28

## 2020-01-01 RX ADMIN — MIDODRINE HYDROCHLORIDE 10 MILLIGRAM(S): 2.5 TABLET ORAL at 11:32

## 2020-01-01 RX ADMIN — ENOXAPARIN SODIUM 40 MILLIGRAM(S): 100 INJECTION SUBCUTANEOUS at 21:17

## 2020-01-01 RX ADMIN — Medication 100 MILLIGRAM(S): at 06:23

## 2020-01-16 NOTE — CDI QUERY NOTE - NSCDIOTHERTXTBX_GEN_ALL_CORE_HH
71 year old admission for shortness of breath.     The following clinical evidence was found in the record:     12/16 CT Chest IMPRESSION:  No PE  Bilateral moderate pleural effusions. Mild cardiomegaly with reflux of contrast within the IVC and hepatic veins compatible with right heart dysfunction.  Multiple chest wall soft tissue nodular lesions with reactive bony changes of the adjacent ribs bilaterally. Partially imaged upper intra-abdominal lymphadenopathy. Mottled appearance of the vertebral column as above. Findings are consistent with patient's known metastatic disease.    12/18 Cardiology Consult:   Reason for Referral/Consultation: carcinoid volume overload  Patient has carcinoid. Echo severe TI mild PHTN. EF 55%. Right sided fluid secondary TI and decreased alb. This is secondary carcinoid.. Lasix iv. Follow lytes and weight. dietician consult re low alb. F/U manzanares    H&P + Progress Notes 12/18 through 12/24 notes: : 65yo female with history of metastatic cancer (carcinoid?)     12/19 Progress Note Hospitalist: Neuroendocrine CA: Follows [facility], not on active treatment    12/20 thru 12/23 Progress Note Hospitalist: 1. Dyspnea on Exertion likely secondary to severe TR, CHF ruled out:     12/24 Hospitalist  Progress Note:  1. Dyspnea on Exertion likely secondary to severe TR, CHF ruled out: Pt aware of her severe TR and has been followed by cardio outpatient....  2. Neuroendocrine CA: Follows [facility], not on active treatment]    Discharge Note: fluid overloaded due to severe tricuspid regurgitation    QUERY:     Based on the above documentation and clinical evidence, can the patients diagnosis be further clarified by clarification of the following questions:      1.  	Agree with Cardiology Consult that fluid overload is carcinoid volume overload?     2.  	Is there acute on chronic right heart failure due to severe tricuspid regurgitation?    3. 	Is the tricuspid insufficiency rheumatic in origin?     4.	Are the Chest CT findings clinically significant for carcinoid tumor metastasis to the intra-abdominal lymph nodes, ribs and vertebra?    Thank you.

## 2020-02-06 NOTE — ED PROVIDER NOTE - NS ED ROS FT
CONSTITUTIONAL: Negatve   SKIN: Negatve   HEAD: Negatve   EYES: Negatve   ENT: Negatve   NECK: Negatve   CARD:Negatve   RESP:Negatve   ABD: Negatve   EXT: See hpi  LYMPH: Negatve   NEURO: Negatve   PSYCH: Negatve

## 2020-02-06 NOTE — ED PROVIDER NOTE - OBJECTIVE STATEMENT
63 yo f, she denies signif pmh other than anasarca (chart review shows some type of metastatic, possible carcinoid tumor), c/o BL LE pain, swelling, redness. seen at her pcp today, sent to ED for iv abx and admission. pt complaining of skin breakdown, weeping, and pain of bl LE. no fever or chills. no cp or sob. recent admission to [facility] for sob, had echo and cta neg for PE.

## 2020-02-06 NOTE — ED ADULT TRIAGE NOTE - NS ED NURSE BANDS TYPE
"Chief Complaint   Patient presents with     Consult     Allergy testing done.Food allergy testing.       Initial /62  Pulse 97  Ht 1.25 m (4' 1.21\")  Wt 30.5 kg (67 lb 3.2 oz)  SpO2 99%  BMI 19.51 kg/m2 Estimated body mass index is 19.51 kg/(m^2) as calculated from the following:    Height as of this encounter: 1.25 m (4' 1.21\").    Weight as of this encounter: 30.5 kg (67 lb 3.2 oz).  Medication Reconciliation: complete   Marleen Pereira MA.... 10:35 AM....8/14/2017      " Name band;

## 2020-02-06 NOTE — ED PROVIDER NOTE - CLINICAL SUMMARY MEDICAL DECISION MAKING FREE TEXT BOX
pt with b/l LE wounds/possible celulitis. art/jayant duplex neg. labs done. abx given. pt admitted. burn/wound consulted.

## 2020-02-06 NOTE — ED PROVIDER NOTE - PATIENT PORTAL LINK FT
You can access the FollowMyHealth Patient Portal offered by [facility] by registering at the following website: [email or web address]. By joining [employer]’s FollowMyHealth portal, you will also be able to view your health information using other applications (apps) compatible with our system.

## 2020-02-06 NOTE — ED PROVIDER NOTE - PHYSICAL EXAMINATION
VITAL SIGNS: I have reviewed nursing notes and confirm.  CONSTITUTIONAL: Well-developed; well-nourished; in no acute distress.  SKIN: Skin exam is warm and dry, no acute rash.  HEAD: Normocephalic; atraumatic.  EYES: PERRL, EOM intact; conjunctiva and sclera clear.  ENT: No nasal discharge; airway clear.   NECK: Supple; non tender.  CARD:+ S1, S2   RESP: No wheezes, rales or rhonchi.  ABD: Normal bowel sounds; soft; non-distended; non-tender;  EXT: Normal ROM. b/l LE edema, with skin ulceration b/l anterior LEs. non circum.  seroius weeping. tender. no erythema or crepitus. cap refill 3 sec. pulses 1+ b/l pt/dp.  LYMPH: No acute adenopathy.  NEURO: Alert. Grossly unremarkable. No focal deficits.  PSYCH: Cooperative, appropriate.

## 2020-02-07 NOTE — CONSULT NOTE ADULT - ASSESSMENT
63 yo F with PMH of metastatic neuroendocrine tumor s/p resection in liver, tricuspid regurg comes to the E with bilateral leg swelling.     IMPRESSION  # B/l leg swelling  # No sepsis on admission  # Macrocytic anemia  # Metastatic neuroendocrine tumor s/p liver resection    RECOMMENDATIONS      This is a pended note. All final recommendations to follow pending discussion with ID Attending 63 yo F with PMH of metastatic neuroendocrine tumor s/p resection in liver, tricuspid regurg comes to the E with bilateral leg swelling.     IMPRESSION  # B/l leg swelling and superficial weeping ulcers that are nonpurulent, likely from fluid overload  # B/l UE swelling, noncompliance with diuretics  # No sepsis on admission  # Macrocytic anemia  # Metastatic neuroendocrine tumor s/p liver resection    RECOMMENDATIONS  - D/c vancomycin  - Start Zyvox 600mg q12h    This is a pended note. All final recommendations to follow pending discussion with ID Attending 63 yo F with PMH of metastatic neuroendocrine tumor s/p resection in liver, tricuspid regurg comes to the E with bilateral leg swelling.     IMPRESSION  # B/l leg swelling and superficial weeping ulcers that are nonpurulent, likely from fluid overload  # B/l UE swelling, noncompliance with diuretics  # No sepsis on admission  # Macrocytic anemia  # Metastatic neuroendocrine tumor s/p liver resection  # 2/06/20 [facility] duplex arterial: no evidence of arterial occlusion or high grade stenosis in b/l LE  # 2/06/20 [facility] duplex vein: no evidence of DVT or superficial thrombophlebitis in b/l LE    RECOMMENDATIONS  - D/c vancomycin  - Start Zyvox 600mg q12h  - Silvadene for local wound care    This is a pended note. All final recommendations to follow pending discussion with ID Attending

## 2020-02-07 NOTE — H&P ADULT - ATTENDING COMMENTS
64 yr old female presented with weeping lower ext.  VITAL SIGNS (Last 24 hrs):  T(C): 36.7 (02-07-20 @ 06:59), Max: 36.7 (02-07-20 @ 05:03)  HR: 97 (02-07-20 @ 06:59) (76 - 98)  BP: 102/74 (02-07-20 @ 06:59) (93/56 - 102/74)  RR: 17 (02-07-20 @ 06:59) (17 - 18)  SpO2: 98% (02-07-20 @ 06:59) (98% - 100%)  Wt(kg): --  Daily     Daily     I&O's Summary                        11.0   7.93  )-----------( 251      ( 07 Feb 2020 11:31 )             32.3   02-07    138  |  103  |  34<H>  ----------------------------<  115<H>  4.0   |  19  |  1.0    Ca    8.1<L>      07 Feb 2020 11:31  Mg     1.8     02-07    TPro  5.7<L>  /  Alb  2.8<L>  /  TBili  0.5  /  DBili  x   /  AST  38  /  ALT  26  /  AlkPhos  264<H>  02-07  ekg-low voltage rate 91/min  o/e  aox3  chest-b/l air entry  [id number]  abd/gi--soft, bs+  no edema  assessment and plan:  #B/l cellulitis on superimposed edema--iv lasix 40mg bid and zyvox as per ID.  # hx of neuroendocrine tumor  s/p resection  # -- with severe TR in heart-- echo planned iv diuresis to continue. Patient was upset in ER hallway and code status not discussed-- to be revisited on when patient on floor.

## 2020-02-07 NOTE — ED ADULT NURSE NOTE - CHPI ED NUR SYMPTOMS NEG
no chills/no decreased eating/drinking/no weakness/no vomiting/no dizziness/no fever/no nausea/no tingling

## 2020-02-07 NOTE — H&P ADULT - NSHPPHYSICALEXAM_GEN_ALL_CORE
PHYSICAL EXAM:  GENERAL: NAD, well-developed,   HEAD:  Atraumatic, Normocephalic  EYES: EOMI, PERRLA, conjunctiva and sclera clear  NECK: Supple, No JVD  CHEST/LUNG: Clear to auscultation bilaterally; No wheeze  HEART: Regular rate and rhythm; No murmurs, rubs, or gallops  ABDOMEN: Soft, Nontender, Nondistended; Bowel sounds present  EXTREMITIES:  tense swollen legs b/l all the way, weeping erythematous skin, with blisters, tender to touch. No purulent drainage  PSYCH: AAOx3  NEUROLOGY: non-focal  SKIN: No rashes or lesions

## 2020-02-07 NOTE — H&P ADULT - ASSESSMENT
65 yo F with PMH of metastatic neuroendocrine tumor s/p resection in liver, tricuspid regurg comes to the E with bilateral leg swelling.     #) B/l LE cellulitis, LE edema in setting of severe TR  - clinically R sided HF with LE edema, weeping skin changes blisters  - HD stable, no sepsis on admission  - s/p cefazolin in ED  - Will give vancomycin for now, given extensive lesions, f/u MRSA  - ID and burn consult- f.u recs  - f/u blood cx  - limb elevation  - wound care as per burn      #) h/o Severe Right sided CHF ( severe TR?)   - admitted in dec 2019 for volume overload  - Seen by cardiology on that admission- stated Right sided heart failure, advised diuresis  - check echo, no echo read in EMR  - pt has been non compliant with lasix since discharge  - Start on lasix IV- BUN/ Creat > 20- possible intravascularly depleted  - Follow up albumin levels- pt had low albumin levels on last admission   optimize nutritional status    #) h/o Neuroendocrine tumor to liver s/p resection with known mets  - CT as above done on last admission with mets  - f/u CXR read- had effusion on last admission  - Respi status stable at present  - chronic anemia 2/2 Anemia of chronic disease- check folate, b12 given macrocytic picture.     #) diet- regular  #) ambulate as tolerated  #) DVT ppx- lovenox  #)gi ppx- not needed  #) dispo- acute  #) code status- as per last admission pt filled DNR in the past, but wanted to reconsider decision, Will clarify code status. 63 yo F with PMH of metastatic neuroendocrine tumor s/p resection in liver, tricuspid regurg comes to the E with bilateral leg swelling.     #) B/l LE cellulitis, LE edema in setting of severe TR  - clinically R sided HF with LE edema, weeping skin changes blisters  - HD stable, no sepsis on admission  - s/p cefazolin in ED  - Will give vancomycin for now, given extensive lesions, f/u MRSA  - ID and burn consult- f.u recs  - f/u blood cx, duplex read  - limb elevation  - wound care as per burn      #) h/o Severe Right sided CHF ( severe TR?)   - admitted in dec 2019 for volume overload  - Seen by cardiology on that admission- stated Right sided heart failure, advised diuresis  - check echo, no echo read in EMR  - pt has been non compliant with lasix since discharge  - Start on lasix IV- BUN/ Creat > 20- possible intravascularly depleted  - Follow up albumin levels- pt had low albumin levels on last admission   optimize nutritional status    #) h/o Neuroendocrine tumor to liver s/p resection with known mets  - CT as above done on last admission with mets  - f/u CXR read- had effusion on last admission  - Respi status stable at present  - chronic anemia 2/2 Anemia of chronic disease- check folate, b12 given macrocytic picture.     #) diet- regular  #) ambulate as tolerated  #) DVT ppx- lovenox  #)gi ppx- not needed  #) dispo- acute  #) code status- as per last admission pt filled DNR in the past, but wanted to reconsider decision, Will clarify code status.

## 2020-02-07 NOTE — H&P ADULT - HISTORY OF PRESENT ILLNESS
Pt is a 65 yo F with PMH of metastatic neuroendocrine tumor s/p resection in liver, tricuspid regurg comes to the E with bilateral leg swelling. Pt was admitted to the [facility] in Dec 2019 for similar complaints when she was diagnosed with severe TR and discharged on po lasix. Pt states that she took the lasix for a few days after discharge but stopped taking it as she was getting lightheaded and dizzy and it didnt agree with her.   Pt states that she has had these leg blisters since discharge but they have gotten worse- to the point that skin started to weep. Pt tried local wound care at home with no improvement. Pt was seen by her PMD and sent to the ED for Iv abx and burn eval.   Denies any fever, chills, headache, SOB, cough.  Pt  has h/o liver neuroendocrine tumor that was resected in 2013 at [facility] followed by embolization procedures. Pt states that she followed up with Dr oscar at [facility] but hasnot been able to see her in a while.   VS on arrival- noted to be stable. labs with chronic macrocytic anemia, BUN/Cr > 20.   VA duplex arterial and venous done in ED- pending read, received cefazolin in ED.

## 2020-02-07 NOTE — CONSULT NOTE ADULT - SUBJECTIVE AND OBJECTIVE BOX
ROZJANICE  64y, Female  Allergy: Gluten (Unknown)  lactose (Unknown)  No Known Drug Allergies      CHIEF COMPLAINT: Leg swelling, cellulitis (07 Feb 2020 04:00)      HPI:  HPI:  Pt is a 65 yo F with PMH of metastatic neuroendocrine tumor s/p resection in liver, tricuspid regurg comes to the E with bilateral leg swelling. Pt was admitted to the [facility] in Dec 2019 for similar complaints when she was diagnosed with severe TR and discharged on po lasix. Pt states that she took the lasix for a few days after discharge but stopped taking it as she was getting lightheaded and dizzy and it didnt agree with her.   Pt states that she has had these leg blisters since discharge but they have gotten worse- to the point that skin started to weep. Pt tried local wound care at home with no improvement. Pt was seen by her PMD and sent to the ED for Iv abx and burn eval.   Denies any fever, chills, headache, SOB, cough.  Pt  has h/o liver neuroendocrine tumor that was resected in 2013 at [facility] followed by embolization procedures. Pt states that she followed up with Dr oscar at [facility] but hasnot been able to see her in a while.   VS on arrival- noted to be stable. labs with chronic macrocytic anemia, BUN/Cr > 20.   VA duplex arterial and venous done in ED- pending read, received cefazolin in ED. (07 Feb 2020 04:00)      Infectious Diseases History:  Old Micro: N/A    FAMILY HISTORY:    PAST MEDICAL & SURGICAL HISTORY:  H/O tricuspid valve disorder  Neuroendocrine cancer  S/P cholecystectomy      SOCIAL HISTORY  Ex smoker, lives at home with       ROS  As above in HPI.    VITALS:  T(F): 98, Max: 98 (02-07-20 @ 05:03)  HR: 97  BP: 102/74  RR: 17Vital Signs Last 24 Hrs  T(C): 36.7 (07 Feb 2020 06:59), Max: 36.7 (07 Feb 2020 05:03)  T(F): 98 (07 Feb 2020 06:59), Max: 98 (07 Feb 2020 05:03)  HR: 97 (07 Feb 2020 06:59) (76 - 98)  BP: 102/74 (07 Feb 2020 06:59) (93/56 - 102/74)  BP(mean): 67 (07 Feb 2020 05:03) (67 - 67)  RR: 17 (07 Feb 2020 06:59) (17 - 18)  SpO2: 98% (07 Feb 2020 06:59) (98% - 100%)    PHYSICAL EXAM:  Gen: NAD, resting in bed  HEENT: Normocephalic, atraumatic  Neck: supple, no lymphadenopathy  CV: Regular rate & regular rhythm  Lungs: CTAB  Abdomen: Soft, BS present  Ext: Warm, well perfused  Neuro: non focal, awake  Skin: no rash, no lesions    TESTS & MEASUREMENTS:                        10.8   5.97  )-----------( 238      ( 07 Feb 2020 00:10 )             31.9     02-07    134<L>  |  103  |  35<H>  ----------------------------<  143<H>  3.7   |  19  |  0.9    Ca    7.9<L>      07 Feb 2020 01:30      eGFR if Non : 69 mL/min/1.73M2 (02-07-20 @ 01:30)  eGFR if : 79 mL/min/1.73M2 (02-07-20 @ 01:30)                INFECTIOUS DISEASES TESTING      RADIOLOGY & ADDITIONAL TESTS:  I have personally reviewed the last Chest xray  CXR      CT      CARDIOLOGY TESTING      All available historical records have been reviewed    MEDICATIONS  chlorhexidine 4% Liquid 1  enoxaparin Injectable 40  furosemide   Injectable 40  vancomycin  IVPB 1000      ANTIBIOTICS:  vancomycin  IVPB 1000 milliGRAM(s) IV Intermittent every 12 hours      All available historical data has been reviewed ROZJANICE  64y, Female  Allergy: Gluten (Unknown)  lactose (Unknown)  No Known Drug Allergies      CHIEF COMPLAINT: Leg swelling, cellulitis (07 Feb 2020 04:00)      HPI:  HPI:  Pt is a 63 yo F with PMH of metastatic neuroendocrine tumor s/p resection in liver, tricuspid regurg comes to the E with bilateral leg swelling. Pt was admitted to the [facility] in Dec 2019 for similar complaints when she was diagnosed with severe TR and discharged on po lasix. Pt states that she took the lasix for a few days after discharge but stopped taking it as she was getting lightheaded and dizzy and it didnt agree with her.   Pt states that she has had these leg blisters since discharge but they have gotten worse- to the point that skin started to weep. Pt tried local wound care at home with no improvement. Pt was seen by her PMD and sent to the ED for Iv abx and burn eval.   Denies any fever, chills, headache, SOB, cough.  Pt  has h/o liver neuroendocrine [place] [facility] [place]. Pt states that she [place] [facility] [place] hasnot been able to see her in a while.   VS on arrival- noted to be stable. labs with chronic macrocytic anemia, BUN/Cr > 20.   VA duplex arterial and venous done in ED- pending read, received cefazolin in ED. (07 Feb 2020 04:00)    As per patient, she initially had "water blisters" on her R leg in October for which she did daily dressing changes and eventually resolved. In December, she complained of SOB and blisters on her b/l LE and was admitted to the [facility] abd discharged on Lasix PO. She was on 20mg lasix three times a week but was increased to 40mg daily which she did not tolerate due to dizziness. Due to this, she was fairly noncompliant with taking her lasix. She states her LE started worsening in the past week and her legs started weeping. Denied fevers, chills, recent abx use, trauma, insect bite, nausea/vomiting, chest pain, palpitations, abdominal pain, diarrhea, and dysuria. She has dyspnea on exertion but denies cough, sputum, or runny nose.    Infectious Diseases History:  Old Micro: N/A    FAMILY HISTORY:    PAST MEDICAL & SURGICAL HISTORY:  H/O tricuspid valve disorder  Neuroendocrine cancer  S/P cholecystectomy      SOCIAL HISTORY  Ex smoker, lives at home with       ROS  As above in HPI.    VITALS:  T(F): 98, Max: 98 (02-07-20 @ 05:03)  HR: 97  BP: 102/74  RR: 17Vital Signs Last 24 Hrs  T(C): 36.7 (07 Feb 2020 06:59), Max: 36.7 (07 Feb 2020 05:03)  T(F): 98 (07 Feb 2020 06:59), Max: 98 (07 Feb 2020 05:03)  HR: 97 (07 Feb 2020 06:59) (76 - 98)  BP: 102/74 (07 Feb 2020 06:59) (93/56 - 102/74)  BP(mean): 67 (07 Feb 2020 05:03) (67 - 67)  RR: 17 (07 Feb 2020 06:59) (17 - 18)  SpO2: 98% (07 Feb 2020 06:59) (98% - 100%)    PHYSICAL EXAM:  Gen: NAD, resting in bed. Lying comfortably on the bed.  HEENT: Normocephalic, atraumatic  Neck: supple, no lymphadenopathy  CV: Regular rate & regular rhythm  Lungs: CTAB  Abdomen: Soft, nontender, nondistended, normoactive BS present  Ext: B/l UE (forearms) have pitting edema 1+; b/l LE also noted to have edema; RLE has 3x2cm superficial weeping ulcer on medial side; LLE has 10x5cm superficial weeping ulcer on anterior shin; b/l LE also has chronic venous stasis changes and xerosis of the skin near b/l ankles  Neuro: non focal, awake    TESTS & MEASUREMENTS:                        10.8   5.97  )-----------( 238      ( 07 Feb 2020 00:10 )             31.9     02-07    134<L>  |  103  |  35<H>  ----------------------------<  143<H>  3.7   |  19  |  0.9    Ca    7.9<L>      07 Feb 2020 01:30      eGFR if Non : 69 mL/min/1.73M2 (02-07-20 @ 01:30)  eGFR if : 79 mL/min/1.73M2 (02-07-20 @ 01:30)                INFECTIOUS DISEASES TESTING      RADIOLOGY & ADDITIONAL TESTS:  I have personally reviewed the last Chest xray  CXR      CT      CARDIOLOGY TESTING      All available historical records have been reviewed    MEDICATIONS  chlorhexidine 4% Liquid 1  enoxaparin Injectable 40  furosemide   Injectable 40  vancomycin  IVPB 1000      ANTIBIOTICS:  vancomycin  IVPB 1000 milliGRAM(s) IV Intermittent every 12 hours      All available historical data has been reviewed ROZJANICE  64y, Female  Allergy: Gluten (Unknown)  lactose (Unknown)  No Known Drug Allergies      CHIEF COMPLAINT: Leg swelling, cellulitis (07 Feb 2020 04:00)      HPI:  HPI:  Pt is a 63 yo F with PMH of metastatic neuroendocrine tumor s/p resection in liver, tricuspid regurg comes to the E with bilateral leg swelling. Pt was admitted to the [facility] in Dec 2019 for similar complaints when she was diagnosed with severe TR and discharged on po lasix. Pt states that she took the lasix for a few days after discharge but stopped taking it as she was getting lightheaded and dizzy and it didnt agree with her.   Pt states that she has had these leg blisters since discharge but they have gotten worse- to the point that skin started to weep. Pt tried local wound care at home with no improvement. Pt was seen by her PMD and sent to the ED for Iv abx and burn eval.   Denies any fever, chills, headache, SOB, cough.  Pt  has h/o liver neuroendocrine tumor that was resected in 2013 at [facility] followed by embolization procedures. Pt states that she followed up with Dr oscar at [facility] but hasnot been able to see her in a while.   VS on arrival- noted to be stable. labs with chronic macrocytic anemia, BUN/Cr > 20.   VA duplex arterial and venous done in ED- pending read, received cefazolin in ED. (07 Feb 2020 04:00)    As per patient, she initially had "water blisters" on her R leg in October for which she did daily dressing changes and eventually resolved. In December, she complained of SOB and blisters on her b/l LE and was admitted to the [facility] abd discharged on Lasix PO. She was on 20mg lasix three times a week but was increased to 40mg daily which she did not tolerate due to dizziness. Due to this, she was fairly noncompliant with taking her lasix. She states her LE started worsening in the past week and her legs started weeping. Denied fevers, chills, recent abx use, trauma, insect bite, nausea/vomiting, chest pain, palpitations, abdominal pain, diarrhea, and dysuria. She has dyspnea on exertion but denies cough, sputum, or runny nose.    Infectious Diseases History:  Old Micro: N/A    FAMILY HISTORY:    PAST MEDICAL & SURGICAL HISTORY:  H/O tricuspid valve disorder  Neuroendocrine cancer  S/P cholecystectomy      SOCIAL HISTORY  Ex smoker, lives at home with       ROS  As above in HPI.    VITALS:  T(F): 98, Max: 98 (02-07-20 @ 05:03)  HR: 97  BP: 102/74  RR: 17Vital Signs Last 24 Hrs  T(C): 36.7 (07 Feb 2020 06:59), Max: 36.7 (07 Feb 2020 05:03)  T(F): 98 (07 Feb 2020 06:59), Max: 98 (07 Feb 2020 05:03)  HR: 97 (07 Feb 2020 06:59) (76 - 98)  BP: 102/74 (07 Feb 2020 06:59) (93/56 - 102/74)  BP(mean): 67 (07 Feb 2020 05:03) (67 - 67)  RR: 17 (07 Feb 2020 06:59) (17 - 18)  SpO2: 98% (07 Feb 2020 06:59) (98% - 100%)    PHYSICAL EXAM:  Gen: NAD, resting in bed. Lying comfortably on the bed.  HEENT: Normocephalic, atraumatic  Neck: supple, no lymphadenopathy  CV: Regular rate & regular rhythm  Lungs: CTAB  Abdomen: Soft, nontender, nondistended, normoactive BS present  Ext: B/l UE (forearms) have pitting edema 1+; b/l LE also noted to have edema; RLE has 3x2cm superficial weeping ulcer on medial side; LLE has 10x5cm superficial weeping ulcer on anterior shin; b/l LE also has chronic venous stasis changes and xerosis of the skin near b/l ankles  Neuro: non focal, awake    TESTS & MEASUREMENTS:                        10.8   5.97  )-----------( 238      ( 07 Feb 2020 00:10 )             31.9     02-07    134<L>  |  103  |  35<H>  ----------------------------<  143<H>  3.7   |  19  |  0.9    Ca    7.9<L>      07 Feb 2020 01:30      eGFR if Non : 69 mL/min/1.73M2 (02-07-20 @ 01:30)  eGFR if : 79 mL/min/1.73M2 (02-07-20 @ 01:30)                INFECTIOUS DISEASES TESTING      RADIOLOGY & ADDITIONAL TESTS:  < from: VA Duplex Lower Extrem Arterial, Bilat (02.06.20 @ 22:45) >  Impression:    No evidence of arterial occlusion or high-grade stenosis in bilateral lower extremities.    ICD-10: I70.213    < end of copied text >      < from: VA Duplex Lower Ext Vein Scan, Bilat (02.06.20 @ 22:45) >  Impression:    No evidence of deep venous thrombosis or superficial thrombophlebitis in bilateral lower extremities.    ICD-10: M 79.89    < end of copied text >        CARDIOLOGY TESTING      All available historical records have been reviewed    MEDICATIONS  chlorhexidine 4% Liquid 1  enoxaparin Injectable 40  furosemide   Injectable 40  vancomycin  IVPB 1000      ANTIBIOTICS:  vancomycin  IVPB 1000 milliGRAM(s) IV Intermittent every 12 hours      All available historical data has been reviewed

## 2020-02-07 NOTE — ED ADULT NURSE NOTE - INTERVENTIONS DEFINITIONS
[place] to call system/Non-slip footwear when patient is off stretcher/Provide visual cue, wrist band, yellow gown, etc./Monitor for mental status changes and reorient to person, place, and time/Review medications for side effects contributing to fall risk/Stretcher in lowest position, wheels locked, appropriate side rails in place/Monitor gait and stability/Physically safe environment: no spills, clutter or unnecessary equipment/Call bell, personal items and telephone within reach/Instruct patient to call for assistance/Provide visual clues: red socks/Reinforce activity limits and safety measures with patient and family/Room bathroom lighting operational

## 2020-02-07 NOTE — ED ADULT NURSE REASSESSMENT NOTE - NS ED NURSE REASSESS COMMENT FT1
Patient's bp 94/72, c/o dizziness, covering physician Janae Nguyen notified, will order bolus 500mg NS.

## 2020-02-08 NOTE — PROGRESS NOTE ADULT - SUBJECTIVE AND OBJECTIVE BOX
Patient is a 64y old  Female who presents with a chief complaint of Leg swelling, blisters which ruptured.   Pt has a h/o diastolic CHF, severe  TR, admitted with acute CHF this time, treated with IV Lasix.   She has a h/o neuroendocrine tumor, follows up at [facility], stable for now and due for her follow up with private oncology.   Today pt is c/o open wounds in the legs and LE pitting edema ( up to her groin).       Vital Signs Last 24 Hrs  T(C): 35.6 (08 Feb 2020 14:28), Max: 36.8 (08 Feb 2020 05:02)  T(F): 96.1 (08 Feb 2020 14:28), Max: 98.3 (08 Feb 2020 05:02)  HR: 98 (08 Feb 2020 14:28) (93 - 102)  BP: 99/61 (08 Feb 2020 14:28) (86/52 - 106/63)  BP(mean): --  RR: 18 (08 Feb 2020 14:28) (18 - 102)    PHYSICAL EXAM:  GENERAL: NAD, ill appearing female   HEAD:  Atraumatic, Normocephalic  NECK: Supple, No JVD, Normal thyroid  NERVOUS SYSTEM:  Alert & Oriented X3, no focal neuro deficit   CHEST/LUNG: decreased BS at bases   HEART: Regular rate and rhythm; No murmurs, rubs, or gallops  ABDOMEN: Soft, Nontender, Nondistended; Bowel sounds present  EXTREMITIES: ACE wrap noted on LE, pitting  edema up to the groin     LABS:                        11.4   5.40  )-----------( 253      ( 08 Feb 2020 08:44 )             33.5     02-08    135  |  98  |  32<H>  ----------------------------<  119<H>  3.3<L>   |  23  |  1.0    Ca    8.2<L>      08 Feb 2020 08:44  Mg     1.6     02-08    TPro  5.7<L>  /  Alb  2.8<L>  /  TBili  0.5  /  DBili  x   /  AST  38  /  ALT  26  /  AlkPhos  264<H>  02-07    PT/INR - ( 08 Feb 2020 08:44 )   PT: 13.50 sec;   INR: 1.17 ratio         PTT - ( 08 Feb 2020 08:44 )  PTT:35.0 sec    RADIOLOGY & ADDITIONAL TESTS:  < from: Xray Chest 1 View- PORTABLE-Routine (02.07.20 @ 05:20) >  Impression:    Bilateral pleural effusions and adjacent atelectasis. Overall, stable exam since December 2019  < from: VA Duplex Lower Extrem Arterial, Bilat (02.06.20 @ 22:45) >    Impression:    No evidence of arterial occlusion or high-grade stenosis in bilateral lower extremities.  < from: Transthoracic Echocardiogram (02.08.20 @ 10:04) >  Summary:   1. Left ventricular ejection fraction, by visual estimation, is 60 to 65%.   2. Severely enlarged right ventricle.   3. Severely reduced RV systolic function.   4. Large pleural effusion in the left lateral region.   5. Severe tricuspid regurgitation.   6. Mild aortic regurgitation.   7. Moderate pulmonic valve regurgitation.   8. Estimated pulmonary artery systolic pressure is 46.4 mmHg assuming a right atrial pressure of 15 mmHg, which is consistent with mild pulmonary hypertension.    MEDICATIONS  (STANDING):  chlorhexidine 4% Liquid 1 Application(s) Topical <User Schedule>  collagenase Ointment 1 Application(s) Topical two times a day  Dakins Solution - 1/2 Strength 1 Application(s) Topical two times a day  enoxaparin Injectable 40 milliGRAM(s) SubCutaneous at bedtime  furosemide   Injectable 40 milliGRAM(s) IV Push two times a day  linezolid  IVPB 600 milliGRAM(s) IV Intermittent every 12 hours  magnesium oxide 400 milliGRAM(s) Oral two times a day with meals  potassium chloride    Tablet ER 40 milliEquivalent(s) Oral every 4 hours

## 2020-02-08 NOTE — PROGRESS NOTE ADULT - ASSESSMENT
65 yo F with PMH of metastatic neuroendocrine tumor s/p resection in liver, tricuspid regurg comes to the E with bilateral leg swelling. Pt was admitted to the [facility] in Dec 2019 for similar complaints when she was diagnosed with severe TR and discharged on po lasix. Pt states that she took the lasix for a few days after discharge but stopped taking it as she was getting lightheaded and dizzy and it didnt agree with her.   Pt states that she has had these leg blisters since discharge but they have gotten worse- to the point that skin started to weep. Pt tried local wound care at home with no improvement. Pt was seen by her PMD and sent to the ED for Iv abx and burn eval.     A/P   # Acute on chronic diastolic CHF/ Hypokalemia/ hypomagnesemia   - fluid restriction 1200 ml in 24 hours   - intake and output monitoring   - daily weight   - on IV Lasix 40 mg Q 12 hours , keep negative balance   - monitor and replete electrolytes ( potassium and magnesium)   - pt developed CHF after treatement for cancer     # LE cellulitis/ open wounds   - started on Zyvox by ID  - pt needs ID follow up   - nasal swab for MRSA is negative   - c/w local wound care   - burn consult     # h/o Neuroendocrine malignancy with liver MTs  - diagnosed in 2013  - pt is due to follow up in [facility], will see private oncology right after discharge from the hospital   - prognosis guarded     # DVT prophylaxis   - on Lovenox     #Progress Note Handoff  Pending (specify):  keep on IV Lasix for 48 hours, monitor output , burn for LE wound care, ID follow up   Family discussion: I spoke with  at the bedside, plan of care discussed with pt and her    Disposition: Home___/SNF___/Other________/Unknown at this time_____x ___

## 2020-02-08 NOTE — CONSULT NOTE ADULT - SUBJECTIVE AND OBJECTIVE BOX
pt with infected bilateral venous stasis ulcers    PE: bilateral lower leg--> edema, erythema and blisters lower legs    PE: excisonal debridement bilateral lower legs to subcut tissue--> cultrues sent

## 2020-02-08 NOTE — CHART NOTE - NSCHARTNOTEFT_GEN_A_CORE
rapid response was called as pt was non responsive for under a min, on my exam, pt was AO*3, she felt dizzy, denied any chest pain, SOB, cough or seizure like activity. pt was hypotensive to 70s/40s with HR of 105, lungs clear on examination. 5 min later pt blood pressure was 90s/50s.    A&P  -need to r/o orthostatic hypotension possibly secondary to dehydration secondary to overdiuresis (pt refused the 6pm 40 IV lasix dose)  -given 250CC bolus   -will decrease lasix to 40 oral once daily, please consider adjusting dose in the AM  -CXR ordered for AM rapid response was called as pt was non responsive for under a min, on my exam, pt was AO*3, she felt dizzy, denied any chest pain, SOB, cough or seizure like activity. pt was hypotensive to 70s/40s with HR of 105, lungs clear on examination with decreased breath sounds at the bases. 5 min later pt blood pressure was 90s/50s.    A&P  -need to r/o orthostatic hypotension possibly secondary to dehydration secondary to overdiuresis (pt refused the 6pm 40 IV lasix dose)  -given 250CC bolus   -will decrease lasix to 40 oral once daily, please consider adjusting dose in the AM  -CXR ordered for AM

## 2020-02-08 NOTE — CONSULT NOTE ADULT - ASSESSMENT
infected bilateral venous stasis ulcers    rec: soap and water qd, check cultures, santyl ointment and dakins wet to dry dressing, kerlex gauze, ace sraps form toes to knees change bid    no surgery needed at this time

## 2020-02-09 NOTE — PROGRESS NOTE ADULT - ASSESSMENT
· Assessment		  63 yo F with PMH of metastatic neuroendocrine tumor s/p resection in liver, tricuspid regurg comes to the E with bilateral leg swelling.     IMPRESSION  # B/l leg swelling and superficial weeping ulcers that are nonpurulent, likely from fluid overload  -erythema is decreasing  # B/l UE swelling, noncompliance with diuretics  # No sepsis on admission  # Macrocytic anemia  # Metastatic neuroendocrine tumor s/p liver resection  # 2/06/20 VA duplex arterial: no evidence of arterial occlusion or high grade stenosis in b/l LE  # 2/06/20 VA duplex vein: no evidence of DVT or superficial thrombophlebitis in b/l LE    RECOMMENDATIONS  - Zyvox 600mg q12h  - Silvadene for local wound care  -could change to po  Zyvox 600 mg q12h on 2/10 for 5 more days

## 2020-02-09 NOTE — PROGRESS NOTE ADULT - ASSESSMENT
65 yo F with PMH of metastatic neuroendocrine tumor s/p resection in liver, tricuspid regurg comes to the E with bilateral leg swelling. Pt was admitted to the [facility] in Dec 2019 for similar complaints when she was diagnosed with severe TR and discharged on po lasix. Pt states that she took the lasix for a few days after discharge but stopped taking it as she was getting lightheaded and dizzy and it didnt agree with her.   Pt states that she has had these leg blisters since discharge but they have gotten worse- to the point that skin started to weep. Pt tried local wound care at home with no improvement. Pt was seen by her PMD and sent to the ED for Iv abx and burn eval.     # Acute on chronic diastolic CHF/ Hypokalemia/ hypomagnesemia  - CXR shows b/l pleural effusions  - TTE shows LVEF 65%, RV systolic dysfunction, enlarged RV, severe TR and mild pulmonary HTN.   - fluid restriction 1200 ml in 24 hours, intake and output monitoring, daily weight   - pt having hypotension, lasix decreased to 40mg q24, s/p IVF 250cc overnight and 500cc in mortning  - on Midodrine 5mg tid for HD support   - monitor and replete electrolytes ( potassium and magnesium)   - pt developed CHF after treatement for cancer     # LE cellulitis/ open wounds   - started on Zyvox 600mg IV q12 by ID  - pt needs ID follow up   - nasal swab for MRSA is negative   - c/w local wound care as per burns, ace wraps  - burn consult     # h/o Neuroendocrine malignancy with liver MTs  - diagnosed in 2013  - pt is due to follow up in [facility], will see private oncology right after discharge from the hospital   - prognosis guarded     # DVTppx: lovenox  # Diet: regular, fluid restriction  # Dispo: acute  # Fullcode

## 2020-02-09 NOTE — PROGRESS NOTE ADULT - SUBJECTIVE AND OBJECTIVE BOX
Patient is a 64y old  Female who presents with a chief complaint of Leg swelling, blisters which ruptured.   Pt has a h/o diastolic CHF, severe  TR, admitted with acute CHF this time, treated with IV Lasix, developed hypotension with AMS ( very short episode last night) , rapid responce was called, pt responded to fluid bolus. For LE wound pt was consulted by burn and ID, on Zyvox po now, dressing was changed this morning.   She has a h/o neuroendocrine tumor, follows up at [facility] for now and due for her follow up with private oncology.   Today pt is c/o weakness, denies pain or any other complaints.       Vital Signs Last 24 Hrs  T(C): 35.6 (09 Feb 2020 13:14), Max: 36.6 (08 Feb 2020 21:11)  T(F): 96.1 (09 Feb 2020 13:14), Max: 97.9 (08 Feb 2020 21:11)  HR: 88 (09 Feb 2020 13:14) (84 - 99)  BP: 90/63 (09 Feb 2020 13:14) (78/50 - 90/63)  BP(mean): --  RR: 18 (09 Feb 2020 13:14) (18 - 20)  SpO2: 95% (09 Feb 2020 07:42) (95% - 95%)    PHYSICAL EXAM:  GENERAL: NAD, ill appearing female   HEAD:  Atraumatic, Normocephalic  NECK: Supple, No JVD, Normal thyroid  NERVOUS SYSTEM:  Alert & Oriented X3, no focal neuro deficit   CHEST/LUNG: decreased BS at bases   HEART: Regular rate and rhythm; No murmurs, rubs, or gallops  ABDOMEN: Soft, Nontender, Nondistended; Bowel sounds present  EXTREMITIES: ACE wrap noted on LE, pitting  edema above the knees     LABS:                                   11.2   4.81  )-----------( 233      ( 09 Feb 2020 08:29 )             31.6   02-09    135  |  101  |  33<H>  ----------------------------<  87  4.7   |  20  |  1.1    Ca    8.2<L>      09 Feb 2020 08:29  Mg     1.6     02-09    TPro  5.5<L>  /  Alb  2.7<L>  /  TBili  0.8  /  DBili  x   /  AST  42<H>  /  ALT  27  /  AlkPhos  294<H>  02-09      PTT - ( 08 Feb 2020 08:44 )  PTT:35.0 sec      RADIOLOGY & ADDITIONAL TESTS:  < from: Xray Chest 1 View- PORTABLE-Routine (02.07.20 @ 05:20) >  Impression:    Bilateral pleural effusions and adjacent atelectasis. Overall, stable exam since December 2019  < from: VA Duplex Lower Extrem Arterial, Bilat (02.06.20 @ 22:45) >    Impression:    No evidence of arterial occlusion or high-grade stenosis in bilateral lower extremities.  < from: Transthoracic Echocardiogram (02.08.20 @ 10:04) >  Summary:   1. Left ventricular ejection fraction, by visual estimation, is 60 to 65%.   2. Severely enlarged right ventricle.   3. Severely reduced RV systolic function.   4. Large pleural effusion in the left lateral region.   5. Severe tricuspid regurgitation.   6. Mild aortic regurgitation.   7. Moderate pulmonic valve regurgitation.   8. Estimated pulmonary artery systolic pressure is 46.4 mmHg assuming a right atrial pressure of 15 mmHg, which is consistent with mild pulmonary hypertension.      MEDICATIONS  (STANDING):  chlorhexidine 4% Liquid 1 Application(s) Topical <User Schedule>  collagenase Ointment 1 Application(s) Topical two times a day  Dakins Solution - 1/2 Strength 1 Application(s) Topical two times a day  enoxaparin Injectable 40 milliGRAM(s) SubCutaneous at bedtime  furosemide    Tablet 20 milliGRAM(s) Oral daily  linezolid  IVPB 600 milliGRAM(s) IV Intermittent every 12 hours  magnesium oxide 400 milliGRAM(s) Oral two times a day with meals  midodrine. 5 milliGRAM(s) Oral three times a day

## 2020-02-09 NOTE — PROGRESS NOTE ADULT - SUBJECTIVE AND OBJECTIVE BOX
SUBJECTIVE:    Patient is a 64y old Female who presents with a chief complaint of Leg swelling, cellulitis (08 Feb 2020 17:51)    Currently admitted to medicine with the primary diagnosis of Leg ulcer     Today is hospital day 2d. This morning she is resting comfortably in bed and reports no new issues or overnight events.     PAST MEDICAL & SURGICAL HISTORY  H/O tricuspid valve disorder  Neuroendocrine cancer  S/P cholecystectomy    SOCIAL HISTORY:  Negative for smoking/alcohol/drug use.     ALLERGIES:  Gluten (Unknown)  lactose (Unknown)  No Known Drug Allergies    MEDICATIONS:  STANDING MEDICATIONS  chlorhexidine 4% Liquid 1 Application(s) Topical <User Schedule>  collagenase Ointment 1 Application(s) Topical two times a day  Dakins Solution - 1/2 Strength 1 Application(s) Topical two times a day  enoxaparin Injectable 40 milliGRAM(s) SubCutaneous at bedtime  furosemide    Tablet 40 milliGRAM(s) Oral daily  linezolid  IVPB 600 milliGRAM(s) IV Intermittent every 12 hours  magnesium oxide 400 milliGRAM(s) Oral two times a day with meals  midodrine. 5 milliGRAM(s) Oral three times a day    PRN MEDICATIONS    VITALS:   T(F): 96.4  HR: 96  BP: 84/60  RR: 18  SpO2: 95%    LABS:                        11.2   4.81  )-----------( 233      ( 09 Feb 2020 08:29 )             31.6     02-08    135  |  98  |  32<H>  ----------------------------<  119<H>  3.3<L>   |  23  |  1.0    Ca    8.2<L>      08 Feb 2020 08:44  Mg     1.6     02-08    TPro  5.7<L>  /  Alb  2.8<L>  /  TBili  0.5  /  DBili  x   /  AST  38  /  ALT  26  /  AlkPhos  264<H>  02-07    PT/INR - ( 08 Feb 2020 08:44 )   PT: 13.50 sec;   INR: 1.17 ratio         PTT - ( 08 Feb 2020 08:44 )  PTT:35.0 sec              RADIOLOGY:  no radiology in past 24 hour.   PHYSICAL EXAM:  GENERAL: NAD, ill appearing female   HEAD:  Atraumatic, Normocephalic  NECK: Supple, No JVD, Normal thyroid  NERVOUS SYSTEM:  Alert & Oriented X3, no focal neuro deficit   CHEST/LUNG: decreased BS at bases   HEART: Regular rate and rhythm; No murmurs, rubs, or gallops  ABDOMEN: Soft, Nontender, Nondistended; Bowel sounds present  EXTREMITIES: ACE wrap noted on LE, pitting  edema up to the groin

## 2020-02-09 NOTE — PROGRESS NOTE ADULT - ASSESSMENT
63 yo F with PMH of metastatic neuroendocrine tumor s/p resection in liver, tricuspid regurg comes to the E with bilateral leg swelling. Pt was admitted to the [facility] in Dec 2019 for similar complaints when she was diagnosed with severe TR and discharged on po lasix. Pt states that she took the lasix for a few days after discharge but stopped taking it as she was getting lightheaded and dizzy and it didnt agree with her.   Pt states that she has had these leg blisters since discharge but they have gotten worse- to the point that skin started to weep. Pt tried local wound care at home with no improvement. Pt was seen by her PMD and sent to the ED for Iv abx and burn eval.     A/P   # Acute on chronic diastolic CHF/ hypomagnesemia   - fluid restriction 1200 ml in 24 hours   - intake and output monitoring   - daily weight   - Lasix dose was lowered today ( pt was hypotensive last night and this morning)    - maintain fluid balance   - monitor and replete electrolytes ( potassium and magnesium) , replete Mg today   - pt developed CHF after treatement for cancer     # Episodes of hypotension / r/o adrenal insufficiency   - likely due to diuretics   - check orthostatic VS, Lasix dose was lowered today   - responded to fluid boluses   - pt has h/o neuroendocrine malignancy, might have adrenal insufficiency  - check cortisol level at 5 AM   - pt was started on Midodrine      # LE cellulitis/ open wounds   - on Zyvox as per  ID  - nasal swab for MRSA is negative   - c/w local wound care   - burn consulted, recommendations for wound care noted     # h/o Neuroendocrine malignancy with liver MTs  - diagnosed in 2013  - pt is due to follow up in [facility], will see private oncology right after discharge from the hospital   - prognosis guarded     # DVT prophylaxis   - on Lovenox     #Progress Note Handoff  Pending (specify):  check orthostatic VS, cortisol level in AM, fall precautions, Pt/rehab   Family discussion: n/a , I spoke with pt, she agreed with a plan of care   Disposition: Home___/SNF___/Other________/Unknown at this time_____x ___

## 2020-02-09 NOTE — PROGRESS NOTE ADULT - SUBJECTIVE AND OBJECTIVE BOX
JANICE COURTNEY  64y, Female    All available historical data reviewed    OVERNIGHT EVENTS:  none    ROS:  General: Denies rigors, nightsweats  HEENT: Denies headache, rhinorrhea, sore throat, eye pain  CV: Denies CP, palpitations  PULM: Denies wheezing, hemoptysis  GI: Denies hematemesis, hematochezia, melena  : Denies discharge, hematuria  MSK: Denies arthralgias, myalgias  SKIN: Denies rash, lesions  NEURO: Denies paresthesias, weakness  PSYCH: Denies depression, anxiety    VITALS:  T(F): 96.4, Max: 97.9 (02-08-20 @ 21:11)  HR: 84  BP: 87/55  RR: 18Vital Signs Last 24 Hrs  T(C): 35.8 (09 Feb 2020 07:42), Max: 36.6 (08 Feb 2020 21:11)  T(F): 96.4 (09 Feb 2020 07:42), Max: 97.9 (08 Feb 2020 21:11)  HR: 84 (09 Feb 2020 10:35) (84 - 99)  BP: 87/55 (09 Feb 2020 10:35) (78/50 - 99/61)  BP(mean): --  RR: 18 (09 Feb 2020 07:42) (18 - 20)  SpO2: 95% (09 Feb 2020 07:42) (95% - 95%)    TESTS & MEASUREMENTS:                        11.2   4.81  )-----------( 233      ( 09 Feb 2020 08:29 )             31.6     02-09    135  |  101  |  33<H>  ----------------------------<  87  4.7   |  20  |  1.1    Ca    8.2<L>      09 Feb 2020 08:29  Mg     1.6     02-09    TPro  5.5<L>  /  Alb  2.7<L>  /  TBili  0.8  /  DBili  x   /  AST  42<H>  /  ALT  27  /  AlkPhos  294<H>  02-09    LIVER FUNCTIONS - ( 09 Feb 2020 08:29 )  Alb: 2.7 g/dL / Pro: 5.5 g/dL / ALK PHOS: 294 U/L / ALT: 27 U/L / AST: 42 U/L / GGT: x                   RADIOLOGY & ADDITIONAL TESTS:  Personal review of radiological diagnostics performed  Echo and EKG results noted when applicable.     MEDICATIONS:  chlorhexidine 4% Liquid 1 Application(s) Topical <User Schedule>  collagenase Ointment 1 Application(s) Topical two times a day  Dakins Solution - 1/2 Strength 1 Application(s) Topical two times a day  enoxaparin Injectable 40 milliGRAM(s) SubCutaneous at bedtime  furosemide    Tablet 40 milliGRAM(s) Oral daily  linezolid  IVPB 600 milliGRAM(s) IV Intermittent every 12 hours  magnesium oxide 400 milliGRAM(s) Oral two times a day with meals  midodrine. 5 milliGRAM(s) Oral three times a day      ANTIBIOTICS:  linezolid  IVPB 600 milliGRAM(s) IV Intermittent every 12 hours

## 2020-02-10 NOTE — PROGRESS NOTE ADULT - SUBJECTIVE AND OBJECTIVE BOX
Patient is a 64y old  Female who presents with a chief complaint of Leg swelling, blisters which ruptured.   Pt has a h/o diastolic CHF, severe  TR, admitted with acute CHF this time, treated with IV Lasix, developed hypotension with AMS ( very short episode last night) , rapid responce was called, pt responded to fluid bolus. For LE wound pt was consulted by burn and ID, on Zyvox po now, and local wound care.   She has a h/o neuroendocrine tumor, follows up at [facility] for now and due for her follow up with private oncology.   Pt was found to have orthostatic hypotension, started on Midodrine po .   Today pt feels better, she is c/o low extremities pain, unable to put weight on her left leg.       Vital Signs Last 24 Hrs  T(C): 35.9 (10 Feb 2020 04:59), Max: 36.1 (09 Feb 2020 21:21)  T(F): 96.6 (10 Feb 2020 04:59), Max: 96.9 (09 Feb 2020 21:21)  HR: --  BP: --  BP(mean): --  RR: 18 (10 Feb 2020 04:59) (18 - 18)      PHYSICAL EXAM:  GENERAL: NAD, ill appearing female   HEAD:  Atraumatic, Normocephalic  NECK: Supple, No JVD, Normal thyroid  NERVOUS SYSTEM:  Alert & Oriented X3, no focal neuro deficit   CHEST/LUNG: decreased BS at bases   HEART: Regular rate and rhythm; No murmurs, rubs, or gallops  ABDOMEN: Soft, Nontender, Nondistended; Bowel sounds present  EXTREMITIES: ACE wrap noted on LE, pitting  edema above the knees ( less than day before)     LABS:                                 10.9   4.86  )-----------( 236      ( 10 Feb 2020 07:03 )             32.1   02-10    132<L>  |  99  |  33<H>  ----------------------------<  110<H>  3.8   |  21  |  1.0    Ca    7.9<L>      10 Feb 2020 07:03  Mg     1.6     02-09    TPro  5.6<L>  /  Alb  2.7<L>  /  TBili  0.8  /  DBili  x   /  AST  46<H>  /  ALT  32  /  AlkPhos  319<H>  02-10    Culture - Other (02.08.20 @ 15:19)    Specimen Source: .Other Right leg    Culture Results:   Culture yields growth of greater than 3 colony types of  bacteria,  which may indicate contamination and normal fatou  Call client services within 7 days if further workup is clinically  indicated.        RADIOLOGY & ADDITIONAL TESTS:  < from: Xray Chest 1 View- PORTABLE-Routine (02.07.20 @ 05:20) >  Impression:    Bilateral pleural effusions and adjacent atelectasis. Overall, stable exam since December 2019  < from: VA Duplex Lower Extrem Arterial, Bilat (02.06.20 @ 22:45) >    Impression:    No evidence of arterial occlusion or high-grade stenosis in bilateral lower extremities.  < from: Transthoracic Echocardiogram (02.08.20 @ 10:04) >  Summary:   1. Left ventricular ejection fraction, by visual estimation, is 60 to 65%.   2. Severely enlarged right ventricle.   3. Severely reduced RV systolic function.   4. Large pleural effusion in the left lateral region.   5. Severe tricuspid regurgitation.   6. Mild aortic regurgitation.   7. Moderate pulmonic valve regurgitation.   8. Estimated pulmonary artery systolic pressure is 46.4 mmHg assuming a right atrial pressure of 15 mmHg, which is consistent with mild pulmonary hypertension.  < from: Xray Chest 1 View- PORTABLE-Routine (02.09.20 @ 08:48) >  IMPRESSION:      Increasing bilateral basilar opacities/effusions. Pleural nodules better delineated on recent CT chest.    < end of copied text >      MEDICATIONS  (STANDING):  chlorhexidine 4% Liquid 1 Application(s) Topical <User Schedule>  collagenase Ointment 1 Application(s) Topical two times a day  Dakins Solution - 1/2 Strength 1 Application(s) Topical two times a day  enoxaparin Injectable 40 milliGRAM(s) SubCutaneous at bedtime  linezolid  IVPB 600 milliGRAM(s) IV Intermittent every 12 hours  magnesium oxide 400 milliGRAM(s) Oral two times a day with meals  midodrine. 10 milliGRAM(s) Oral three times a day

## 2020-02-10 NOTE — PROGRESS NOTE ADULT - ASSESSMENT
63 yo F with PMH of metastatic neuroendocrine tumor s/p resection in liver, tricuspid regurg comes to the E with bilateral leg swelling. Pt was admitted to the [facility] in Dec 2019 for similar complaints when she was diagnosed with severe TR and discharged on po lasix. Pt states that she took the lasix for a few days after discharge but stopped taking it as she was getting lightheaded and dizzy and it didnt agree with her.   Pt states that she has had these leg blisters since discharge but they have gotten worse- to the point that skin started to weep. Pt tried local wound care at home with no improvement. Pt was seen by her PMD and sent to the ED for Iv abx and burn eval.     # Acute on chronic diastolic CHF/ Hypokalemia/ hypomagnesemia  - CXR shows b/l pleural effusions  - TTE shows LVEF 65%, RV systolic dysfunction, enlarged RV, severe TR and mild pulmonary HTN.   - fluid restriction 1200 ml in 24 hours, intake and output monitoring, daily weight   - pt having hypotension, lasix decreased to 40mg q24, s/p IVF 250cc overnight and 500cc in morning of 2/9  - on Midodrine 5mg tid for HD support   - monitor and replete electrolytes (potassium and magnesium)   - pt developed CHF after treatement for cancer     # Episodic hypotension possibly secondary to overdiuresis vs sepsis vs r/o adrenal insufficiency  - orthostatic hypotension, lasix dose lowered  - s/p IVF, on midodrine 5mg tid  - h/o NEM, f/u am cortisol level    # LE cellulitis/ open wounds   - started on Zyvox 600mg IV q12 by ID  - pt needs ID follow up   - nasal swab for MRSA is negative   - c/w local wound care as per burns, ace wraps  - burn consult     # h/o Neuroendocrine malignancy with liver MTs  - diagnosed in 2013  - pt is due to follow up in [facility], will see private oncology right after discharge from the hospital   - prognosis guarded     # DVTppx: lovenox  # Diet: regular, fluid restriction  # Dispo: acute  # Fullcode 63 yo F with PMH of metastatic neuroendocrine tumor s/p resection in liver, tricuspid regurg comes to the E with bilateral leg swelling. Pt was admitted to the [facility] in Dec 2019 for similar complaints when she was diagnosed with severe TR and discharged on po lasix. Pt states that she took the lasix for a few days after discharge but stopped taking it as she was getting lightheaded and dizzy and it didnt agree with her.   Pt states that she has had these leg blisters since discharge but they have gotten worse- to the point that skin started to weep. Pt tried local wound care at home with no improvement. Pt was seen by her PMD and sent to the ED for Iv abx and burn eval.     # Acute on chronic diastolic CHF  - CXR shows b/l pleural effusions  - TTE shows LVEF 65%, RV systolic dysfunction, enlarged RV, severe TR and mild pulmonary HTN.   - fluid restriction 1200 ml in 24 hours, intake and output monitoring, daily weight   - pt having hypotension, lasix decreased to 40mg q24, s/p IVF 250cc overnight and 500cc in morning of 2/9  - on Midodrine 10mg tid for HD support   - monitor and replete electrolytes (potassium and magnesium)   - pt developed CHF after treatement for cancer     # Episodic hypotension possibly secondary to overdiuresis vs sepsis vs r/o adrenal insufficiency  - orthostatic hypotension, lasix dose lowered  - s/p IVF, on midodrine 10mg tid  - h/o NEM, f/u am cortisol level    # Hypokalemia / hypomagnesemia  - s/p repletion  - f/u CMP    # LE cellulitis/ open wounds   - started on Zyvox 600mg IV q12 by ID  - pt needs ID follow up   - nasal swab for MRSA is negative   - c/w local wound care as per burns, ace wraps  - burn consult     # h/o Neuroendocrine malignancy with liver MTs  - diagnosed in 2013  - pt is due to follow up in [facility], will see private oncology right after discharge from the hospital   - prognosis guarded     # DVTppx: lovenox  # Diet: regular, fluid restriction  # Dispo: acute  # Fullcode 63 yo F with PMH of metastatic neuroendocrine tumor s/p resection in liver, tricuspid regurg comes to the E with bilateral leg swelling. Pt was admitted to the [facility] in Dec 2019 for similar complaints when she was diagnosed with severe TR and discharged on po lasix. Pt states that she took the lasix for a few days after discharge but stopped taking it as she was getting lightheaded and dizzy and it didnt agree with her.   Pt states that she has had these leg blisters since discharge but they have gotten worse- to the point that skin started to weep. Pt tried local wound care at home with no improvement. Pt was seen by her PMD and sent to the ED for Iv abx and burn eval.     # Acute on chronic diastolic CHF  - CXR shows b/l pleural effusions  - TTE shows LVEF 65%, RV systolic dysfunction, enlarged RV, severe TR and mild pulmonary HTN.   - fluid restriction 1200 ml in 24 hours, intake and output monitoring, daily weight   - pt having hypotension, lasix decreased to 40mg q24, s/p IVF 250cc overnight and 500cc in morning of 2/9  - on Midodrine 10mg tid for HD support   - monitor and replete electrolytes (potassium and magnesium)   - pt developed CHF after treatement for cancer     # Episodic hypotension possibly secondary to overdiuresis vs sepsis vs r/o adrenal insufficiency  - orthostatic hypotension, lasix dose lowered  - s/p IVF, on midodrine 10mg tid  - h/o NEM, f/u am cortisol level    # Hypokalemia / hypomagnesemia  - s/p repletion  - f/u CMP    # LE cellulitis/ open wounds   - started on Zyvox 600mg IV q12 by ID  - pt needs ID follow up   - nasal swab for MRSA is negative   - c/w local wound care as per burns, ace wraps  - f/u wound cx, burns     # h/o Neuroendocrine malignancy with liver MTs  - diagnosed in 2013  - pt is due to follow up in [facility], will see private oncology right after discharge from the hospital   - prognosis guarded     # DVTppx: lovenox  # Diet: regular, fluid restriction  # Dispo: acute  # Fullcode

## 2020-02-10 NOTE — DISCHARGE NOTE NURSING/CASE MANAGEMENT/SOCIAL WORK - PATIENT PORTAL LINK FT
You can access the FollowMyHealth Patient Portal offered by [facility] by registering at the following website: [email or web address]. By joining [employer]’s FollowMyHealth portal, you will also be able to view your health information using other applications (apps) compatible with our system.

## 2020-02-10 NOTE — PROGRESS NOTE ADULT - SUBJECTIVE AND OBJECTIVE BOX
SUBJECTIVE:    Patient is a 64y old Female who presents with a chief complaint of Leg swelling, cellulitis (2020 15:31)    Currently admitted to medicine with the primary diagnosis of Leg ulcer     Today is hospital day 3d. This morning she is resting comfortably in bed and reports no new issues or overnight events.     PAST MEDICAL & SURGICAL HISTORY  H/O tricuspid valve disorder  Neuroendocrine cancer  S/P cholecystectomy    SOCIAL HISTORY:  Negative for smoking/alcohol/drug use.     ALLERGIES:  Gluten (Unknown)  lactose (Unknown)  No Known Drug Allergies    MEDICATIONS:  STANDING MEDICATIONS  chlorhexidine 4% Liquid 1 Application(s) Topical <User Schedule>  collagenase Ointment 1 Application(s) Topical two times a day  Dakins Solution - 1/2 Strength 1 Application(s) Topical two times a day  enoxaparin Injectable 40 milliGRAM(s) SubCutaneous at bedtime  furosemide    Tablet 20 milliGRAM(s) Oral daily  linezolid  IVPB 600 milliGRAM(s) IV Intermittent every 12 hours  magnesium oxide 400 milliGRAM(s) Oral two times a day with meals  midodrine. 5 milliGRAM(s) Oral three times a day    PRN MEDICATIONS    VITALS:   T(F): 96.6  HR: 88  BP: 90/63  RR: 18  SpO2: 95%    LABS:                        11.2   4.81  )-----------( 233      ( 2020 08:29 )             31.6     02-09    135  |  101  |  33<H>  ----------------------------<  87  4.7   |  20  |  1.1    Ca    8.2<L>      2020 08:29  Mg     1.6     02-09    TPro  5.5<L>  /  Alb  2.7<L>  /  TBili  0.8  /  DBili  x   /  AST  42<H>  /  ALT  27  /  AlkPhos  294<H>  02-    PT/INR - ( 2020 08:44 )   PT: 13.50 sec;   INR: 1.17 ratio         PTT - ( 2020 08:44 )  PTT:35.0 sec  Urinalysis Basic - ( 2020 19:00 )    Color: Yellow / Appearance: Slightly Turbid / S.020 / pH: x  Gluc: x / Ketone: Negative  / Bili: Small / Urobili: <2 mg/dL   Blood: x / Protein: 30 mg/dL / Nitrite: Negative   Leuk Esterase: Small / RBC: 26 /HPF / WBC 6 /HPF   Sq Epi: x / Non Sq Epi: >27 /HPF / Bacteria: Moderate            Culture - Other (collected 2020 15:19)  Source: .Other Right leg  Final Report (2020 23:14):    Culture yields growth of greater than 3 colony types of    bacteria,  which may indicate contamination and normal fatou    Call client services within 7 days if further workup is clinically    indicated.    Culture - Other (collected 2020 14:35)  Source: .Other bilateral leg  Final Report (2020 23:15):    Culture yields growth of greater than 3 colony types of    bacteria,  which may indicate contamination and normal fatou    Call client services within 7 days if further workup is clinically    indicated.          RADIOLOGY:  no radiology in past 24 hours.  PHYSICAL EXAM:  GENERAL: NAD, ill appearing female   HEAD:  Atraumatic, Normocephalic  NECK: Supple, No JVD, Normal thyroid  NERVOUS SYSTEM:  Alert & Oriented X3, no focal neuro deficit   CHEST/LUNG: decreased BS at bases   HEART: Regular rate and rhythm; No murmurs, rubs, or gallops  ABDOMEN: Soft, Nontender, Nondistended; Bowel sounds present  EXTREMITIES: ACE wrap noted on LE, pitting  edema above the knees

## 2020-02-10 NOTE — PROGRESS NOTE ADULT - ASSESSMENT
65 yo F with PMH of metastatic neuroendocrine tumor s/p resection in liver, tricuspid regurg comes to the E with bilateral leg swelling. Pt was admitted to the [facility] in Dec 2019 for similar complaints when she was diagnosed with severe TR and discharged on po lasix. Pt states that she took the lasix for a few days after discharge but stopped taking it as she was getting lightheaded and dizzy and it didnt agree with her.   Pt states that she has had these leg blisters since discharge but they have gotten worse- to the point that skin started to weep. Pt tried local wound care at home with no improvement. Pt was seen by her PMD and sent to the ED for Iv abx and burn eval.     A/P   # Acute on chronic diastolic CHF/ hypomagnesemia   - fluid restriction 1200 ml in 24 hours   - intake and output monitoring   - daily weight   - Lasix dose was lowered today ( pt was hypotensive last night and this morning)    - maintain fluid balance   - monitor and replete electrolytes ( potassium and magnesium) , replete Mg today   - pt developed CHF after treatement for cancer   - in case if pt is still hypotensive with worsening CHF, consider a Dopamine drip with diuretics as a last resort     # Orthostatic  hypotension / r/o adrenal insufficiency   - likely due to diuretics   -  orthostatic VS Q shift, hold Lasix   - pt has h/o neuroendocrine malignancy, might have adrenal insufficiency  - check cortisol level   - pt was started on Midodrine      # LE cellulitis/ open wounds   - on Zyvox as per  ID  - nasal swab for MRSA is negative   - c/w local wound care   - burn consulted, recommendations for wound care noted     # h/o Neuroendocrine malignancy with liver MTs  - diagnosed in 2013  - [facility], will see private oncology right after discharge from the hospital   - prognosis guarded     # DVT prophylaxis   - on Lovenox     #Progress Note Handoff  Pending (specify):  check orthostatic Q shift, f/u  cortisol level , fall precautions, Pt/rehab , hold Lasix for now   Family discussion: n/a , I spoke with pt, she agreed with a plan of care   Disposition: Home___/SNF___/Other________/Unknown at this time_____x ___

## 2020-02-11 NOTE — PROGRESS NOTE ADULT - ASSESSMENT
· Assessment		  65 yo F with PMH of metastatic neuroendocrine tumor s/p resection in liver, tricuspid regurg comes to the E with bilateral leg swelling.     IMPRESSION  # B/l leg swelling and superficial weeping ulcers that are nonpurulent, likely from fluid overload  -erythema is decreasing  # B/l UE swelling, noncompliance with diuretics  # No sepsis on admission  # Macrocytic anemia  # Metastatic neuroendocrine tumor s/p liver resection  # 2/06/20 [facility] duplex arterial: no evidence of arterial occlusion or high grade stenosis in b/l LE  # 2/06/20 [facility] duplex vein: no evidence of DVT or superficial thrombophlebitis in b/l LE    RECOMMENDATIONS  - Zyvox 600mg q12h  - Silvadene for local wound care  -could change to po  doxycycline 100  mg q12h on for 5 more days  -recall prn please

## 2020-02-11 NOTE — PROGRESS NOTE ADULT - ASSESSMENT
63 yo F with PMH of metastatic neuroendocrine tumor s/p resection in liver, tricuspid regurg comes to the E with bilateral leg swelling. Pt was admitted to the [facility] in Dec 2019 for similar complaints when she was diagnosed with severe TR and discharged on po lasix. Pt states that she took the lasix for a few days after discharge but stopped taking it as she was getting lightheaded and dizzy and it didnt agree with her.   Pt states that she has had these leg blisters since discharge but they have gotten worse- to the point that skin started to weep. Pt tried local wound care at home with no improvement. Pt was seen by her PMD and sent to the ED for Iv abx and burn eval.     # Acute on chronic diastolic CHF  - CXR shows b/l pleural effusions  - TTE shows LVEF 65%, RV systolic dysfunction, enlarged RV, severe TR and mild pulmonary HTN.   - fluid restriction 1200 ml in 24 hours, intake and output monitoring, daily weight   - pt having hypotension, lasix decreased to 40mg q24, s/p IVF 250cc overnight and 500cc in morning of 2/9  - on Midodrine 10mg tid for HD support   - monitor and replete electrolytes (potassium and magnesium)   - pt developed CHF after treatement for cancer     # Episodic hypotension possibly secondary to overdiuresis vs sepsis vs r/o adrenal insufficiency  - orthostatic hypotension, lasix dose lowered  - s/p IVF, on midodrine 10mg tid  - h/o NEM, f/u am cortisol level    # Hypokalemia / hypomagnesemia  - s/p repletion  - f/u CMP    # LE cellulitis/ open wounds   - started on Zyvox 600mg IV q12 by ID  - pt needs ID follow up   - nasal swab for MRSA is negative   - c/w local wound care as per burns, ace wraps  - f/u wound cx, burns     # h/o Neuroendocrine malignancy with liver MTs  - diagnosed in 2013  - pt is due to follow up in [facility], will see private oncology right after discharge from the hospital   - prognosis guarded     # DVTppx: lovenox  # Diet: regular, fluid restriction  # Dispo: acute  # Fullcode 65 yo F with PMH of metastatic neuroendocrine tumor s/p resection in liver, tricuspid regurg comes to the E with bilateral leg swelling. Pt was admitted to the [facility] in Dec 2019 for similar complaints when she was diagnosed with severe TR and discharged on po lasix. Pt states that she took the lasix for a few days after discharge but stopped taking it as she was getting lightheaded and dizzy and it didnt agree with her.   Pt states that she has had these leg blisters since discharge but they have gotten worse- to the point that skin started to weep. Pt tried local wound care at home with no improvement. Pt was seen by her PMD and sent to the ED for Iv abx and burn eval.     # Acute on chronic diastolic CHF  - CXR shows b/l pleural effusions  - TTE shows LVEF 65%, RV systolic dysfunction, enlarged RV, severe TR and mild pulmonary HTN.   - fluid restriction 1200 ml in 24 hours, intake and output monitoring, daily weight   - pt having hypotension, lasix decreased to 40mg q24, s/p IVF 250cc overnight and 500cc in morning of 2/9  - on Midodrine 10mg tid for HD support   - monitor and replete electrolytes (potassium and magnesium)   - pt developed CHF after treatement for cancer     # Episodic hypotension possibly secondary to overdiuresis vs sepsis vs r/o adrenal insufficiency  - orthostatic hypotension, lasix dose lowered  - s/p IVF, on midodrine 10mg tid  - h/o NEM, cortisol level elevated    # Hypokalemia / hypomagnesemia  - s/p repletion  - f/u CMP    # LE cellulitis/ open wounds   - started on Zyvox 600mg IV q12 by ID  - pt needs ID follow up   - nasal swab for MRSA is negative   - c/w local wound care as per burns, ace wraps  - f/u wound cx, burns     # h/o Neuroendocrine malignancy with liver MTs  - diagnosed in 2013  - pt is due to follow up in [facility], will see private oncology right after discharge from the hospital   - prognosis guarded     # DVTppx: lovenox  # Diet: regular, fluid restriction  # Dispo: acute  # Fullcode 63 yo F with PMH of metastatic neuroendocrine tumor s/p resection in liver, tricuspid regurg comes to the E with bilateral leg swelling. Pt was admitted to the [facility] in Dec 2019 for similar complaints when she was diagnosed with severe TR and discharged on po lasix. Pt states that she took the lasix for a few days after discharge but stopped taking it as she was getting lightheaded and dizzy and it didnt agree with her.   Pt states that she has had these leg blisters since discharge but they have gotten worse- to the point that skin started to weep. Pt tried local wound care at home with no improvement. Pt was seen by her PMD and sent to the ED for Iv abx and burn eval.     # Acute on chronic diastolic CHF  - CXR shows b/l pleural effusions  - TTE shows LVEF 65%, RV systolic dysfunction, enlarged RV, severe TR and mild pulmonary HTN.   - fluid restriction 1200 ml in 24 hours, intake and output monitoring, daily weight   - pt having hypotension, will hold lasix for now, s/p IVF 250cc overnight and 500cc in morning of 2/9  - on Midodrine 10mg tid for HD support   - monitor and replete electrolytes (potassium and magnesium)   - pt developed CHF after treatement for cancer   - if patint dvelops SOB again or becomes hypotensive, will start on dopamine drip with diuretics    # Episodic hypotension possibly secondary to overdiuresis vs sepsis vs r/o adrenal insufficiency  - orthostatic hypotension, lasix dose lowered  - s/p IVF, on midodrine 10mg tid  - h/o NEM, cortisol level elevated    # Hypokalemia / hypomagnesemia  - s/p repletion  - f/u CMP    # LE cellulitis/ open wounds   - switched from Zyvox to doxycycline 100mg q12 for 7 days as per ID  - pt needs ID follow up   - nasal swab for MRSA is negative   - c/w local wound care as per burns, ace wraps  - f/u wound cx, burns     # h/o Neuroendocrine malignancy with liver MTs  - diagnosed in 2013  - pt is due to follow up in [facility], will see private oncology right after discharge from the hospital   - prognosis guarded     # DVTppx: lovenox  # Diet: regular, fluid restriction  # Dispo: acute  # Fullcode 63 yo F with PMH of metastatic neuroendocrine tumor s/p resection in liver, tricuspid regurg comes to the E with bilateral leg swelling. [facility] when she was diagnosed with severe TR and discharged on po Lasix Pt states that she took the Lasix for a few days after discharge but stopped taking it as she was getting lightheaded and dizzy and it didn't agree with her.   Pt states that she has had these leg blisters since discharge but they have gotten worse- to the point that skin started to weep. Pt tried local wound care at home with no improvement. Pt was seen by her PMD and sent to the ED for Iv abx and burn eval.     # Acute on chronic diastolic CHF  - CXR shows b/l pleural effusions  - TTE shows LVEF 65%, RV systolic dysfunction, enlarged RV, severe TR and mild pulmonary HTN.   - fluid restriction 1200 ml in 24 hours, intake and output monitoring, daily weight   - pt having hypotension, will hold Lasix for now, s/p IVF 250cc overnight and 500cc in morning of 2/9  - on Midodrine 10mg tid for HD support   - monitor and replete electrolytes (potassium and magnesium)   - pt developed CHF after treatement for cancer   - if patient develops SOB again or becomes hypotensive, will start on dopamine drip with diuretics  - f/u orthostatics, if negative will restart Lasix    # Episodic hypotension possibly secondary to overdiuresis vs sepsis vs r/o adrenal insufficiency  - orthostatic hypotension, lasix dose lowered  - s/p IVF, on midodrine 10mg tid  - h/o NEM, cortisol level elevated  - f/u orthostatics    # Hypokalemia / hypomagnesemia  - s/p repletion  - f/u CMP    # LE cellulitis/ open wounds   - switched from Zyvox to doxycycline 100mg q12 for 5 days till 2/16 as per ID  - pt needs ID follow up   - nasal swab for MRSA is negative   - c/w local wound care as per burns, ace wraps  - pain control with percocet  - f/u wound cx, burns     # h/o Neuroendocrine malignancy with liver MTs  - diagnosed in 2013  - pt is due to follow up in [facility], will see private oncology right after discharge from the hospital   - prognosis guarded     # DVTppx: lovenox  # Diet: regular, fluid restriction  # Dispo: acute  # Fullcode

## 2020-02-11 NOTE — PROGRESS NOTE ADULT - SUBJECTIVE AND OBJECTIVE BOX
Patient is a 64y old  Female who presents with a chief complaint of Leg swelling, blisters which ruptured.   Pt has a h/o diastolic CHF, severe  TR, admitted with acute CHF this time, treated with IV Lasix, developed hypotension with AMS ( very short episode last night) , rapid responce was called, pt responded to fluid bolus. For LE wound pt was consulted by burn and ID, on Zyvox po now, and local wound care.   She has a h/o neuroendocrine tumor, follows up at [facility] for now and due for her follow up with private oncology.   Pt was found to have orthostatic hypotension, started on Midodrine po .   Today pt c/o SOB at times and generalized weakness, denies pain.       Vital Signs Last 24 Hrs  T(C): 35.6 (11 Feb 2020 14:09), Max: 35.9 (10 Feb 2020 19:57)  T(F): 96 (11 Feb 2020 14:09), Max: 96.7 (10 Feb 2020 19:57)  HR: 84 (11 Feb 2020 14:09) (84 - 94)  BP: 90/57 (11 Feb 2020 14:09) (90/57 - 97/63)  BP(mean): --  RR: 18 (11 Feb 2020 14:09) (18 - 18)  SpO2: 96% (11 Feb 2020 09:59) (95% - 96%)      PHYSICAL EXAM:  GENERAL: NAD, ill appearing female   HEAD:  Atraumatic, Normocephalic  NECK: Supple, No JVD, Normal thyroid  NERVOUS SYSTEM:  Alert & Oriented X3, no focal neuro deficit   CHEST/LUNG: decreased BS at bases   HEART: Regular rate and rhythm; No murmurs, rubs, or gallops  ABDOMEN: Soft, Nontender, Nondistended; Bowel sounds present  EXTREMITIES: ACE wrap noted on LE, pitting  edema above the knees ( less than day before)     LABS:                                                11.0   4.25  )-----------( 242      ( 11 Feb 2020 06:19 )             32.0   02-11    133<L>  |  100  |  37<H>  ----------------------------<  142<H>  5.3<H>   |  18  |  1.2    Ca    8.3<L>      11 Feb 2020 06:19  Mg     2.0     02-11    TPro  5.3<L>  /  Alb  2.4<L>  /  TBili  0.8  /  DBili  x   /  AST  36  /  ALT  27  /  AlkPhos  302<H>  02-11      Culture - Other (02.08.20 @ 15:19)    Specimen Source: .Other Right leg    Culture Results:   Culture yields growth of greater than 3 colony types of  bacteria,  which may indicate contamination and normal fatou  Call client services within 7 days if further workup is clinically  indicated.        RADIOLOGY & ADDITIONAL TESTS:  < from: Xray Chest 1 View- PORTABLE-Routine (02.07.20 @ 05:20) >  Impression:    Bilateral pleural effusions and adjacent atelectasis. Overall, stable exam since December 2019  < from: VA Duplex Lower Extrem Arterial, Bilat (02.06.20 @ 22:45) >    Impression:    No evidence of arterial occlusion or high-grade stenosis in bilateral lower extremities.  < from: Transthoracic Echocardiogram (02.08.20 @ 10:04) >  Summary:   1. Left ventricular ejection fraction, by visual estimation, is 60 to 65%.   2. Severely enlarged right ventricle.   3. Severely reduced RV systolic function.   4. Large pleural effusion in the left lateral region.   5. Severe tricuspid regurgitation.   6. Mild aortic regurgitation.   7. Moderate pulmonic valve regurgitation.   8. Estimated pulmonary artery systolic pressure is 46.4 mmHg assuming a right atrial pressure of 15 mmHg, which is consistent with mild pulmonary hypertension.  < from: Xray Chest 1 View- PORTABLE-Routine (02.09.20 @ 08:48) >  IMPRESSION:      Increasing bilateral basilar opacities/effusions. Pleural nodules better delineated on recent CT chest.    < end of copied text >      MEDICATIONS  (STANDING):  chlorhexidine 4% Liquid 1 Application(s) Topical <User Schedule>  collagenase Ointment 1 Application(s) Topical two times a day  Dakins Solution - 1/2 Strength 1 Application(s) Topical two times a day  doxycycline hyclate Capsule 100 milliGRAM(s) Oral every 12 hours  enoxaparin Injectable 40 milliGRAM(s) SubCutaneous at bedtime  magnesium oxide 400 milliGRAM(s) Oral two times a day with meals  midodrine. 10 milliGRAM(s) Oral three times a day    MEDICATIONS  (PRN):  oxycodone    5 mG/acetaminophen 325 mG 1 Tablet(s) Oral every 6 hours PRN Severe Pain (7 - 10)

## 2020-02-11 NOTE — PROGRESS NOTE ADULT - SUBJECTIVE AND OBJECTIVE BOX
JANICE COURTNEY  64y, Female    All available historical data reviewed    OVERNIGHT EVENTS:  no fevers  has no complaints    ROS:  General: Denies rigors, night sweats  HEENT: Denies headache, rhinorrhea, sore throat, eye pain  CV: Denies CP, palpitations  PULM: Denies wheezing, hemoptysis  GI: Denies hematemesis, hematochezia, melena  : Denies discharge, hematuria  MSK: Denies arthralgias, myalgias  SKIN: Denies rash, lesions  NEURO: Denies paresthesias, weakness  PSYCH: Denies depression, anxiety    VITALS:  T(F): 96.4, Max: 96.7 (02-10-20 @ 19:57)  HR: 89  BP: 91/54  RR: 18Vital Signs Last 24 Hrs  T(C): 35.8 (2020 05:00), Max: 35.9 (10 Feb 2020 19:57)  T(F): 96.4 (2020 05:00), Max: 96.7 (10 Feb 2020 19:57)  HR: 89 (2020 05:00) (89 - 94)  BP: 91/54 (2020 05:00) (91/54 - 97/63)  BP(mean): --  RR: 18 (2020 05:00) (18 - 18)  SpO2: 96% (2020 09:59) (95% - 96%)    TESTS & MEASUREMENTS:                        11.0   4.25  )-----------( 242      ( 2020 06:19 )             32.0         133<L>  |  100  |  37<H>  ----------------------------<  142<H>  5.3<H>   |  18  |  1.2    Ca    8.3<L>      2020 06:19  Mg     2.0         TPro  5.3<L>  /  Alb  2.4<L>  /  TBili  0.8  /  DBili  x   /  AST  36  /  ALT  27  /  AlkPhos  302<H>  02-11    LIVER FUNCTIONS - ( 2020 06:19 )  Alb: 2.4 g/dL / Pro: 5.3 g/dL / ALK PHOS: 302 U/L / ALT: 27 U/L / AST: 36 U/L / GGT: x             Culture - Blood (collected 20 @ 12:05)  Source: .Blood None  Preliminary Report (02-10-20 @ 18:01):    No growth to date.    Culture - Other (collected 20 @ 15:19)  Source: .Other Right leg  Final Report (20 @ 23:14):    Culture yields growth of greater than 3 colony types of    bacteria,  which may indicate contamination and normal fatou    Call client services within 7 days if further workup is clinically    indicated.    Culture - Other (collected 20 @ 14:35)  Source: .Other bilateral leg  Final Report (20 @ 23:15):    Culture yields growth of greater than 3 colony types of    bacteria,  which may indicate contamination and normal fatou    Call client services within 7 days if further workup is clinically    indicated.      Urinalysis Basic - ( 2020 19:00 )    Color: Yellow / Appearance: Slightly Turbid / S.020 / pH: x  Gluc: x / Ketone: Negative  / Bili: Small / Urobili: <2 mg/dL   Blood: x / Protein: 30 mg/dL / Nitrite: Negative   Leuk Esterase: Small / RBC: 26 /HPF / WBC 6 /HPF   Sq Epi: x / Non Sq Epi: >27 /HPF / Bacteria: Moderate          RADIOLOGY & ADDITIONAL TESTS:  Personal review of radiological diagnostics performed  Echo and EKG results noted when applicable.     MEDICATIONS:  chlorhexidine 4% Liquid 1 Application(s) Topical <User Schedule>  collagenase Ointment 1 Application(s) Topical two times a day  Dakins Solution - 1/2 Strength 1 Application(s) Topical two times a day  doxycycline hyclate Capsule 100 milliGRAM(s) Oral every 12 hours  enoxaparin Injectable 40 milliGRAM(s) SubCutaneous at bedtime  magnesium oxide 400 milliGRAM(s) Oral two times a day with meals  midodrine. 10 milliGRAM(s) Oral three times a day  oxycodone    5 mG/acetaminophen 325 mG 1 Tablet(s) Oral every 6 hours PRN      ANTIBIOTICS:  doxycycline hyclate Capsule 100 milliGRAM(s) Oral every 12 hours

## 2020-02-11 NOTE — PROGRESS NOTE ADULT - ASSESSMENT
63 yo F with PMH of metastatic neuroendocrine tumor s/p resection in liver, tricuspid regurg comes to the E with bilateral leg swelling. Pt was admitted to the [facility] in Dec 2019 for similar complaints when she was diagnosed with severe TR and discharged on po lasix. Pt states that she took the lasix for a few days after discharge but stopped taking it as she was getting lightheaded and dizzy and it didnt agree with her.   Pt states that she has had these leg blisters since discharge but they have gotten worse- to the point that skin started to weep. Pt tried local wound care at home with no improvement. Pt was seen by her PMD and sent to the ED for Iv abx and burn eval.     A/P   # Acute on chronic diastolic CHF/ hypomagnesemia   - fluid restriction 1200 ml in 24 hours   - intake and output monitoring   - daily weight   - diuretics held ( pt developed orthostatic hypotension )   - maintain fluid balance   - monitor and replete electrolytes   - pt developed CHF after treatement for cancer   - in case if pt is still hypotensive with worsening CHF, consider a Dopamine drip with diuretics as a last resort     # Orthostatic  hypotension / r/o adrenal insufficiency   - likely due to diuretics   -  orthostatic VS Q shift  - tight ACE wrap dressing on LE, abdominal binder   - pt has h/o neuroendocrine malignancy, might have adrenal insufficiency  -  cortisol level was high ( has to be drown at 5 AM)   - pt was started on Midodrine      # LE cellulitis/ open wounds   - pt was on Zyvox, ID followed up, can change Abx to po Doxycycline   - nasal swab for MRSA is negative   - c/w local wound care   - burn consulted, recommendations for wound care noted   - Silvadene topical    # h/o Neuroendocrine malignancy with liver MTs  - diagnosed in 2013  - pt is due to follow up in [facility], will see private oncology right after discharge from the hospital   - prognosis guarded     # DVT prophylaxis   - on Lovenox     #Progress Note Handoff  Pending (specify):  check orthostatic Q shift, f/u  cortisol level  ( repeat at 5AM) , hold diuretics for now, resume if SBP more that 100   Family discussion: n/a , I spoke with pt, she agreed with a plan of care   Disposition: Home___/SNF___/Other________/Unknown at this time_____x ___

## 2020-02-11 NOTE — PROGRESS NOTE ADULT - SUBJECTIVE AND OBJECTIVE BOX
SUBJECTIVE:    Patient is a 64y old Female who presents with a chief complaint of Leg swelling, cellulitis (10 Feb 2020 13:11)    Currently admitted to medicine with the primary diagnosis of Leg ulcer     Today is hospital day 4d. This morning she is resting comfortably in bed and reports no new issues or overnight events.     PAST MEDICAL & SURGICAL HISTORY  H/O tricuspid valve disorder  Neuroendocrine cancer  S/P cholecystectomy    SOCIAL HISTORY:  Negative for smoking/alcohol/drug use.     ALLERGIES:  Gluten (Unknown)  lactose (Unknown)  No Known Drug Allergies    MEDICATIONS:  STANDING MEDICATIONS  chlorhexidine 4% Liquid 1 Application(s) Topical <User Schedule>  collagenase Ointment 1 Application(s) Topical two times a day  Dakins Solution - 1/2 Strength 1 Application(s) Topical two times a day  enoxaparin Injectable 40 milliGRAM(s) SubCutaneous at bedtime  linezolid  IVPB 600 milliGRAM(s) IV Intermittent every 12 hours  magnesium oxide 400 milliGRAM(s) Oral two times a day with meals  midodrine. 10 milliGRAM(s) Oral three times a day    PRN MEDICATIONS    VITALS:   T(F): 96.4  HR: 89  BP: 91/54  RR: 18  SpO2: 95%    LABS:                        11.0   4.25  )-----------( 242      ( 2020 06:19 )             32.0     02-10    132<L>  |  99  |  33<H>  ----------------------------<  110<H>  3.8   |  21  |  1.0    Ca    7.9<L>      10 Feb 2020 07:03  Mg     1.6     02-    TPro  5.6<L>  /  Alb  2.7<L>  /  TBili  0.8  /  DBili  x   /  AST  46<H>  /  ALT  32  /  AlkPhos  319<H>  02-10      Urinalysis Basic - ( 2020 19:00 )    Color: Yellow / Appearance: Slightly Turbid / S.020 / pH: x  Gluc: x / Ketone: Negative  / Bili: Small / Urobili: <2 mg/dL   Blood: x / Protein: 30 mg/dL / Nitrite: Negative   Leuk Esterase: Small / RBC: 26 /HPF / WBC 6 /HPF   Sq Epi: x / Non Sq Epi: >27 /HPF / Bacteria: Moderate            Culture - Blood (collected 2020 12:05)  Source: .Blood None  Preliminary Report (10 Feb 2020 18:01):    No growth to date.    Culture - Other (collected 2020 15:19)  Source: .Other Right leg  Final Report (2020 23:14):    Culture yields growth of greater than 3 colony types of    bacteria,  which may indicate contamination and normal fatou    Call client services within 7 days if further workup is clinically    indicated.    Culture - Other (collected 2020 14:35)  Source: .Other bilateral leg  Final Report (2020 23:15):    Culture yields growth of greater than 3 colony types of    bacteria,  which may indicate contamination and normal fatou    Call client services within 7 days if further workup is clinically    indicated.          RADIOLOGY:  no radiology in past 24 hours.  PHYSICAL EXAM:  GENERAL: NAD, ill appearing female   HEAD:  Atraumatic, Normocephalic  NECK: Supple, No JVD, Normal thyroid  NERVOUS SYSTEM:  Alert & Oriented X3, no focal neuro deficit   CHEST/LUNG: decreased BS at bases   HEART: Regular rate and rhythm; No murmurs, rubs, or gallops  ABDOMEN: Soft, Nontender, Nondistended; Bowel sounds present  EXTREMITIES: ACE wrap noted on LE, pitting  edema above the knees

## 2020-02-12 NOTE — PROGRESS NOTE ADULT - SUBJECTIVE AND OBJECTIVE BOX
SUBJECTIVE:    Patient is a 64y old Female who presents with a chief complaint of Leg swelling, cellulitis (11 Feb 2020 14:12)    Currently admitted to medicine with the primary diagnosis of Leg ulcer     Today is hospital day 5d. This morning she is resting comfortably in bed and reports no new issues or overnight events.     PAST MEDICAL & SURGICAL HISTORY  H/O tricuspid valve disorder  Neuroendocrine cancer  S/P cholecystectomy    SOCIAL HISTORY:  Negative for smoking/alcohol/drug use.     ALLERGIES:  Gluten (Unknown)  lactose (Unknown)  No Known Drug Allergies    MEDICATIONS:  STANDING MEDICATIONS  chlorhexidine 4% Liquid 1 Application(s) Topical <User Schedule>  doxycycline hyclate Capsule 100 milliGRAM(s) Oral every 12 hours  enoxaparin Injectable 40 milliGRAM(s) SubCutaneous at bedtime  magnesium oxide 400 milliGRAM(s) Oral two times a day with meals  midodrine. 10 milliGRAM(s) Oral three times a day  silver sulfADIAZINE 1% Cream 1 Application(s) Topical two times a day    PRN MEDICATIONS  oxycodone    5 mG/acetaminophen 325 mG 1 Tablet(s) Oral every 6 hours PRN    VITALS:   T(F): 96.5  HR: 77  BP: 90/57  RR: 18  SpO2: 96%    LABS:                        11.0   4.25  )-----------( 242      ( 11 Feb 2020 06:19 )             32.0     02-11    133<L>  |  100  |  37<H>  ----------------------------<  142<H>  5.3<H>   |  18  |  1.2    Ca    8.3<L>      11 Feb 2020 06:19  Mg     2.0     02-11    TPro  5.3<L>  /  Alb  2.4<L>  /  TBili  0.8  /  DBili  x   /  AST  36  /  ALT  27  /  AlkPhos  302<H>  02-11              Culture - Blood (collected 09 Feb 2020 12:05)  Source: .Blood None  Preliminary Report (10 Feb 2020 18:01):    No growth to date.          RADIOLOGY:    < from: Xray Chest 1 View- PORTABLE-Routine (02.09.20 @ 08:48) >    IMPRESSION:      Increasing bilateral basilar opacities/effusions. Pleural nodules better delineated on recent CT chest.    < end of copied text >  PHYSICAL EXAM:  GENERAL: NAD, ill appearing female   HEAD:  Atraumatic, Normocephalic  NECK: Supple, No JVD, Normal thyroid  NERVOUS SYSTEM:  Alert & Oriented X3, no focal neuro deficit   CHEST/LUNG: decreased BS at bases   HEART: Regular rate and rhythm; No murmurs, rubs, or gallops  ABDOMEN: Soft, Nontender, Nondistended; Bowel sounds present  EXTREMITIES: ACE wrap noted on LE, pitting  edema above the knees

## 2020-02-12 NOTE — PROGRESS NOTE ADULT - ASSESSMENT
63 yo F with PMH of metastatic neuroendocrine tumor s/p resection in liver, tricuspid regurg comes to the E with bilateral leg swelling. [place] [facility] for similar complaints when she was diagnosed with severe TR and discharged on po Lasix Pt states that she took the Lasix for a few days after discharge but stopped taking it as she was getting lightheaded and dizzy and it didn't agree with her.   Pt states that she has had these leg blisters since discharge but they have gotten worse- to the point that skin started to weep. Pt tried local wound care at home with no improvement. Pt was seen by her PMD and sent to the ED for Iv abx and burn eval.     # Acute on chronic diastolic CHF  - CXR shows b/l pleural effusions  - TTE shows LVEF 65%, RV systolic dysfunction, enlarged RV, severe TR and mild pulmonary HTN.   - fluid restriction 1200 ml in 24 hours, intake and output monitoring, daily weight   - pt having hypotension, will hold Lasix for now, s/p multiple boluses of IVF  - on Midodrine 10mg tid for HD support   - monitor and replete electrolytes (potassium and magnesium)   - pt developed CHF after treatement for cancer   - if patient develops SOB again or becomes hypotensive, will start on dopamine drip with diuretics  - f/u orthostatics, if negative will restart Lasix    # Episodic hypotension possibly secondary to overdiuresis vs sepsis vs r/o adrenal insufficiency  - orthostatic hypotension, Lasix dose lowered  - s/p IVF, on midodrine 10mg tid  - h/o NEM, cortisol level elevated  - f/u orthostatics    # Hypokalemia / hypomagnesemia  - s/p repletion  - f/u CMP    # LE cellulitis/ open wounds   - switched from Zyvox to doxycycline 100mg q12 for 5 days till 2/16 as per ID  - nasal swab for MRSA is negative   - c/w local wound care as per burns, ace wraps  - pain control with percocet  - f/u wound cx, burns     # h/o Neuroendocrine malignancy with liver MTs  - diagnosed in 2013  - pt is due to follow up in [facility], will see private oncology right after discharge from the hospital   - prognosis guarded     # DVTppx: lovenox  # Diet: regular, fluid restriction  # Dispo: acute  # Fullcode 63 yo F with PMH of metastatic neuroendocrine tumor s/p resection in liver, tricuspid regurg comes to the E with bilateral leg swelling. Pt was admitted to the [facility] [place], operated by [facility] in Dec 2019 for similar complaints when she was diagnosed with severe TR and discharged on po Lasix Pt states that she took the Lasix for a few days after discharge but stopped taking it as she was getting lightheaded and dizzy and it didn't agree with her.   Pt states that she has had these leg blisters since discharge but they have gotten worse- to the point that skin started to weep. Pt tried local wound care at home with no improvement. Pt was seen by her PMD and sent to the ED for Iv abx and burn eval.     # Acute on chronic diastolic CHF  - CXR shows b/l pleural effusions  - TTE shows LVEF 65%, RV systolic dysfunction, enlarged RV, severe TR and mild pulmonary HTN.   - fluid restriction 1200 ml in 24 hours, intake and output monitoring, daily weight   - pt having hypotension, will hold Lasix for now, s/p multiple boluses of IVF  - on Midodrine 10mg tid for HD support   - monitor and replete electrolytes (potassium and magnesium)   - pt developed CHF after treatement for cancer   - if patient develops SOB again or becomes hypotensive, will give bolus and might start on dopamine drip with diuretics  - f/u orthostatics, if negative will restart Lasix    # Episodic hypotension possibly secondary to overdiuresis vs sepsis vs r/o adrenal insufficiency  - orthostatic hypotension, Lasix dose lowered  - s/p IVF, on midodrine 10mg tid  - h/o NEM, cortisol level elevated  - f/u orthostatics  - if hypotensive SBP < 80, give bolus.    # Hypokalemia / hypomagnesemia  - s/p repletion  - f/u CMP    # LE cellulitis/ open wounds   - switched from Zyvox to doxycycline 100mg q12 for 5 days till 2/16 as per ID  - nasal swab for MRSA is negative   - c/w local wound care as per burns, ace wraps  - pain control with percocet  - f/u wound cx, burns     # h/o Neuroendocrine malignancy with liver MTs  - diagnosed in 2013  - pt is due to follow up in [facility], will see private oncology right after discharge from the hospital   - prognosis guarded     # DVTppx: lovenox  # Diet: regular, fluid restriction  # Dispo: acute  # Fullcode 65 yo F with PMH of metastatic neuroendocrine tumor s/p resection in liver, tricuspid regurg comes to the E with bilateral leg swelling. Pt was admitted to the [facility] in Dec 2019 for similar complaints when she was diagnosed with severe TR and discharged on po Lasix Pt states that she took the Lasix for a few days after discharge but stopped taking it as she was getting lightheaded and dizzy and it didn't agree with her.   Pt states that she has had these leg blisters since discharge but they have gotten worse- to the point that skin started to weep. Pt tried local wound care at home with no improvement. Pt was seen by her PMD and sent to the ED for Iv abx and burn eval.     # Acute on chronic diastolic CHF  - CXR shows b/l pleural effusions  - TTE shows LVEF 65%, RV systolic dysfunction, enlarged RV, severe TR and mild pulmonary HTN.   - fluid restriction 1200 ml in 24 hours, intake and output monitoring, daily weight   - pt having hypotension, will hold Lasix for now, s/p multiple boluses of IVF  - on Midodrine 10mg tid for HD support   - monitor and replete electrolytes (potassium and magnesium)   - pt developed CHF after treatement for cancer   - if patient develops SOB again or becomes hypotensive, will give bolus and might start on dopamine drip with diuretics  - f/u orthostatics, if negative will restart Lasix    # Episodic hypotension possibly secondary to overdiuresis vs sepsis vs carcinoid syndrome  - orthostatic hypotension, Lasix dose lowered  - s/p IVF, on midodrine 10mg tid  - h/o NEM, cortisol level elevated  - f/u orthostatics  - if hypotensive SBP < 80, give bolus.  - c/w NS @75cc  - f/u urine 5HIAA  - consult cardio, endo and heme/onc    # Hypokalemia / hypomagnesemia  - s/p repletion  - f/u CMP    # LE cellulitis/ open wounds   - switched from Zyvox to doxycycline 100mg q12 for 5 days till 2/16 as per ID  - nasal swab for MRSA is negative   - c/w local wound care as per burns, ace wraps  - pain control with percocet  - f/u wound cx, burns     # h/o Neuroendocrine malignancy with liver MTs  - diagnosed in 2013  - pt is due to follow up in [facility], will see private oncology right after discharge from the hospital   - prognosis guarded     # DVTppx: lovenox  # Diet: regular, fluid restriction  # Dispo: acute  # Fullcode

## 2020-02-12 NOTE — PHYSICAL THERAPY INITIAL EVALUATION ADULT - SPECIFY REASON(S)
As per EZEKIEL Delgado, the patient had a syncopal like episode when the doctor tried to sit the patient up from supine. PT IE will be deferred until the patient is medically stable.

## 2020-02-13 NOTE — PROGRESS NOTE ADULT - ASSESSMENT
65 yo F with PMH of metastatic neuroendocrine tumor s/p resection in liver, tricuspid regurg comes to the E with bilateral leg swelling. Pt was admitted to the south [facility] in Dec 2019 for similar complaints when she was diagnosed with severe TR and discharged on po Lasix Pt states that she took the Lasix for a few days after discharge but stopped taking it as she was getting lightheaded and dizzy and it didn't agree with her.   Pt states that she has had these leg blisters since discharge but they have gotten worse- to the point that skin started to weep. Pt tried local wound care at home with no improvement. Pt was seen by her PMD and sent to the ED for Iv abx and burn eval.     # Acute on chronic diastolic CHF  - CXR shows b/l pleural effusions  - TTE shows LVEF 65%, RV systolic dysfunction, enlarged RV, severe TR and mild pulmonary HTN.   - fluid restriction 1200 ml in 24 hours, intake and output monitoring, daily weight   - pt having hypotension, will hold Lasix for now, s/p multiple boluses of IVF  - on Midodrine 10mg tid for HD support   - monitor and replete electrolytes (potassium and magnesium)   - pt developed CHF after treatement for cancer   - if patient develops SOB again or becomes hypotensive, will give bolus and might start on dopamine drip with diuretics  - f/u orthostatics, if negative will restart Lasix    # Episodic hypotension possibly secondary to overdiuresis vs sepsis vs carcinoid syndrome  - orthostatic hypotension, Lasix dose lowered  - TTE showed severe TR  - s/p IVF, on midodrine 10mg tid  - h/o NEM, cortisol level elevated  - f/u orthostatics  - if hypotensive SBP < 80, give bolus.  - c/w NS @75cc  - f/u urine 5HIAA  - consult cardio, endo and heme/onc    # Hypokalemia / hypomagnesemia  - s/p repletion  - f/u CMP    # LE cellulitis/ open wounds   - switched from Zyvox to doxycycline 100mg q12 for 5 days till 2/16 as per ID  - nasal swab for MRSA is negative   - c/w local wound care as per burns, ace wraps  - pain control with percocet  - f/u wound cx, burns     # h/o Neuroendocrine malignancy with liver MTs  - diagnosed in 2013  - pt is due to follow up in [place], will see private oncology right after discharge from the hospital   - prognosis guarded     # DVTppx: lovenox  # Diet: regular, fluid restriction  # Dispo: acute  # Fullcode 63 yo F with PMH of metastatic neuroendocrine tumor s/p resection in liver, tricuspid regurg comes to the E with bilateral leg swelling. Pt [facility] in Dec [facility] for similar complaints when she was diagnosed with severe TR and discharged on po Lasix Pt states that she took the Lasix for a few days after discharge but stopped taking it as she was getting lightheaded and dizzy and it didn't agree with her.   Pt states that she has had these leg blisters since discharge but they have gotten worse- to the point that skin started to weep. Pt tried local wound care at home with no improvement. Pt was seen by her PMD and sent to the ED for Iv abx and burn eval.     # Acute on chronic diastolic CHF  - CXR shows b/l pleural effusions  - TTE shows LVEF 65%, RV systolic dysfunction, enlarged RV, severe TR and mild pulmonary HTN.   - fluid restriction 1200 ml in 24 hours, intake and output monitoring, daily weight   - pt having hypotension, will hold Lasix for now, s/p multiple boluses of IVF  - on Midodrine 10mg tid for HD support   - monitor and replete electrolytes (potassium and magnesium)   - pt developed CHF after treatement for cancer   - if patient develops SOB again or becomes hypotensive, will give bolus and might start on dopamine drip with diuretics  - f/u orthostatics, if negative will restart Lasix    # Episodic hypotension possibly secondary to overdiuresis vs sepsis vs carcinoid syndrome  - orthostatic hypotension, Lasix dose lowered  - TTE showed severe TR  - s/p IVF, on midodrine 10mg tid  - h/o NEM, cortisol level elevated  - f/u orthostatics  - if hypotensive SBP < 80, give bolus.  - c/w NS @75cc  - f/u urine 5HIAA  - consult cardio, endo and heme/onc    # Diarrhea possibly component of carcinoid  - off laxatives, Cdeff negative  - c/w loperamide    # Hypokalemia / hypomagnesemia  - s/p repletion  - f/u CMP    # LE cellulitis/ open wounds   - switched from Zyvox to doxycycline 100mg q12 for 5 days till 2/16 as per ID  - nasal swab for MRSA is negative  - wound cx 2/8 contaminated   - c/w local wound care as per burns, ace wraps  - pain control with percocet  - f/u burns     # h/o Neuroendocrine malignancy with liver MTs  - diagnosed in 2013  - pt is due to [facility] up [facility] [place] [facility], will see private oncology right after discharge from the hospital   - prognosis guarded     # DVTppx: lovenox  # Diet: regular, fluid restriction  # Dispo: acute  # Fullcode 65 yo F with PMH of metastatic neuroendocrine tumor s/p resection in liver, tricuspid regurg comes to the E with bilateral leg swelling. [facility] for similar complaints when she was diagnosed with severe TR and discharged on po Lasix Pt states that she took the Lasix for a few days after discharge but stopped taking it as she was getting lightheaded and dizzy and it didn't agree with her.   Pt states that she has had these leg blisters since discharge but they have gotten worse- to the point that skin started to weep. Pt tried local wound care at home with no improvement. Pt was seen by her PMD and sent to the ED for Iv abx and burn eval.     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   - fluid restriction 1200 ml in 24 hours, intake and output monitoring, daily weight   - pt having hypotension, will hold Lasix for now, s/p multiple boluses of IVF  - on Midodrine 10mg tid for HD support   - monitor and replete electrolytes (potassium and magnesium)   - pt developed CHF after treatement for cancer   - if patient develops SOB again or becomes hypotensive, will give bolus and might start on dopamine drip with diuretics  - f/u orthostatics, if negative will restart Lasix    # Episodic hypotension possibly secondary to diarrhea and carcinoid syndrome  - orthostatic hypotension, Lasix dose lowered  - TTE showed severe TR  - s/p IVF, on midodrine 10mg tid  - h/o NEM, cortisol level elevated  - f/u orthostatics  - if hypotensive SBP < 80, give bolus.  - c/w NS @75cc  - f/u urine 5HIAA  - consult cardio, endo and heme/onc    # Diarrhea possibly component of carcinoid  - off laxatives, Cdeff negative  - c/w loperamide    # Hypokalemia / hypomagnesemia  - s/p repletion  - f/u CMP    # LE cellulitis/ open wounds   - switched from Zyvox to doxycycline 100mg q12 for 5 days till 2/16 as per ID  - nasal swab for MRSA is negative  - wound cx 2/8 contaminated   - c/w local wound care as per burns, ace wraps  - pain control with percocet  - f/u burns     # h/o Neuroendocrine malignancy with liver MTs  - diagnosed in 2013  - pt is due to follow up in [facility], will see private oncology right after discharge from the hospital   - prognosis guarded     # DVT ppx: Lovenox  # Diet: regular   # Dispo: acute  # Fullcode

## 2020-02-13 NOTE — PHYSICAL THERAPY INITIAL EVALUATION ADULT - GENERAL OBSERVATIONS, REHAB EVAL
chart reviewed - pt approached for PT IE - pt reports being vewry tired and sleepy currently - she is also eating currently - she states she eats very slowly - PT will follow up

## 2020-02-13 NOTE — PROGRESS NOTE ADULT - SUBJECTIVE AND OBJECTIVE BOX
SUBJECTIVE:    Patient is a 64y old Female who presents with a chief complaint of Leg swelling, cellulitis (12 Feb 2020 07:36)    Currently admitted to medicine with the primary diagnosis of Leg ulcer     Today is hospital day 6d. This morning she is resting comfortably in bed and reports no new issues or overnight events. Pt was hypotensive overnight, received bolus of 500cc.     PAST MEDICAL & SURGICAL HISTORY  H/O tricuspid valve disorder  Neuroendocrine cancer  S/P cholecystectomy    SOCIAL HISTORY:  Negative for smoking/alcohol/drug use.     ALLERGIES:  Gluten (Unknown)  lactose (Unknown)  No Known Drug Allergies    MEDICATIONS:  STANDING MEDICATIONS  chlorhexidine 4% Liquid 1 Application(s) Topical <User Schedule>  doxycycline hyclate Capsule 100 milliGRAM(s) Oral every 12 hours  enoxaparin Injectable 40 milliGRAM(s) SubCutaneous at bedtime  magnesium oxide 400 milliGRAM(s) Oral two times a day with meals  midodrine. 10 milliGRAM(s) Oral three times a day  silver sulfADIAZINE 1% Cream 1 Application(s) Topical two times a day  sodium chloride 0.9%. 1000 milliLiter(s) IV Continuous <Continuous>    PRN MEDICATIONS  oxycodone    5 mG/acetaminophen 325 mG 1 Tablet(s) Oral every 6 hours PRN    VITALS:   T(F): 96.3  HR: 85  BP: 89/51  RR: 18  SpO2: 96%    LABS:                        11.6   3.98  )-----------( 242      ( 12 Feb 2020 08:27 )             32.7     02-12    127<L>  |  96<L>  |  43<H>  ----------------------------<  100<H>  4.8   |  17  |  1.4    Ca    8.3<L>      12 Feb 2020 08:27    TPro  5.2<L>  /  Alb  2.5<L>  /  TBili  0.9  /  DBili  x   /  AST  30  /  ALT  26  /  AlkPhos  283<H>  02-12                  RADIOLOGY:  no radiology in past 24 hours.  PHYSICAL EXAM:  GENERAL: NAD, ill appearing female   HEAD:  Atraumatic, Normocephalic  NECK: Supple, No JVD, Normal thyroid  NERVOUS SYSTEM:  Alert & Oriented X3, no focal neuro deficit   CHEST/LUNG: decreased BS at bases   HEART: Regular rate and rhythm; No murmurs, rubs, or gallops  ABDOMEN: Soft, Nontender, Nondistended; Bowel sounds present  EXTREMITIES: ACE wrap noted on LE, pitting  edema above the knees

## 2020-02-14 NOTE — DIETITIAN INITIAL EVALUATION ADULT. - OTHER INFO
Pt p/w worsening leg swelling and cellulitis--local wound care as per burn. Suspected carcinoid heart disease and severe TR, s/p CXR indicating b/l pleural effusions. Episodic hypotension possibly 2/2 diarrhea and carcinoid syndrome, lasix dose decreased. Hypokalemia / hypomagnesemia s/p repletion. h/o neuroendocrine cancer with mets to lung (diagnosed 2013) s/p resection in liver; f/u oncologist at [facility].

## 2020-02-14 NOTE — PROGRESS NOTE ADULT - SUBJECTIVE AND OBJECTIVE BOX
SUBJECTIVE:    Patient is a 64y old Female who presents with a chief complaint of Leg swelling, cellulitis (13 Feb 2020 07:04)    Currently admitted to medicine with the primary diagnosis of Leg ulcer     Today is hospital day 7d. This morning she is resting comfortably in bed and reports no new issues or overnight events.     PAST MEDICAL & SURGICAL HISTORY  H/O tricuspid valve disorder  Neuroendocrine cancer  S/P cholecystectomy    SOCIAL HISTORY:  Negative for smoking/alcohol/drug use.     ALLERGIES:  Gluten (Unknown)  lactose (Unknown)  No Known Drug Allergies    MEDICATIONS:  STANDING MEDICATIONS  chlorhexidine 4% Liquid 1 Application(s) Topical <User Schedule>  doxycycline hyclate Capsule 100 milliGRAM(s) Oral every 12 hours  enoxaparin Injectable 40 milliGRAM(s) SubCutaneous at bedtime  magnesium oxide 400 milliGRAM(s) Oral two times a day with meals  midodrine. 10 milliGRAM(s) Oral three times a day  silver sulfADIAZINE 1% Cream 1 Application(s) Topical two times a day  sodium chloride 0.9%. 1000 milliLiter(s) IV Continuous <Continuous>    PRN MEDICATIONS  loperamide 2 milliGRAM(s) Oral three times a day PRN  oxycodone    5 mG/acetaminophen 325 mG 1 Tablet(s) Oral every 6 hours PRN    VITALS:   T(F): 96.1  HR: 91  BP: 98/62  RR: 18  SpO2: 95%    LABS:                        12.2   4.42  )-----------( 232      ( 13 Feb 2020 08:13 )             35.1     02-13    131<L>  |  96<L>  |  48<H>  ----------------------------<  111<H>  4.8   |  18  |  1.7<H>    Ca    8.6      13 Feb 2020 08:13    TPro  5.3<L>  /  Alb  2.6<L>  /  TBili  0.7  /  DBili  x   /  AST  29  /  ALT  25  /  AlkPhos  286<H>  02-13                  RADIOLOGY:  no radiology in past 24 hours.  PHYSICAL EXAM:  GEN: No acute distress  LUNGS: Clear to auscultation bilaterally   HEART: S1/S2 present. RRR.   ABD: Soft, non-tender, non-distended. Bowel sounds present  EXT: NC/NC/NE/2+PP/OTERO  NEURO: AAOX3

## 2020-02-14 NOTE — DIETITIAN INITIAL EVALUATION ADULT. - PHYSICAL APPEARANCE
BMI 23.3 (172#, 72in). EMR wt hx of 185# on 12/18/19 admit. unsure of accuracy of CBW vs EMR wt hx and pt unable to confirm wt loss. will continue to monitor. 1+ generalized edema, 2+ edema R arm and 3+ edema L arm.

## 2020-02-14 NOTE — DIETITIAN INITIAL EVALUATION ADULT. - CONTINUE CURRENT NUTRITION CARE PLAN
1. Add Ensure compact BID and trial of Vital 1.0 q24H. 2. Continue current diet order. 3. Continue imodium. Consider daily multivitamin with minerals.

## 2020-02-14 NOTE — CONSULT NOTE ADULT - ASSESSMENT
Pt is a 63 yo F with PMH of metastatic neuroendocrine tumor s/p resection in liver, tricuspid regurg comes to the E with bilateral leg swelling. [place] when she was diagnosed with severe TR and discharged on po lasix and some other medications for carcinoid which she stopped taking. Admitted to [facility] for right sided HF, she was diuresed and now hypotensive. Rapid response called earlier today for unresponsiveness for few seconds with hypotension BP in 70s resolved with Trendelenburg position.    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 Pt is a 65 yo F with PMH of metastatic neuroendocrine tumor s/p resection in liver, tricuspid regurg comes to the E with bilateral leg swelling. Pt was admitted to the south [facility] in Dec 2019 for similar complaints when she was diagnosed with severe TR and discharged on po lasix and some other medications for carcinoid which she stopped taking. Admitted to [facility] for right sided HF, she was diuresed and now hypotensive. Rapid response called earlier today for unresponsiveness for few seconds with hypotension BP in 70s resolved with Trendelenburg position.    # IMPRESSION  Neuroendocrine tumor with mets to liver s/p resection at [place]  Severe TR  Mod PI  Mild to Mod AI  b/l pleural effusions  ELOINA  Low albumin  HAGMA  Low voltage QRS (can be seen in carcinoid)    # Recommendations  hold diuresis for now given ELOINA and pt is hypotensive  f/u endo recs for metastatic carcinoid heart syndrome; octreotide vs sandostatin  get records from [facility] and Dr Bueno office  Pt will likely need intervention for severe TR once BP and renal function improves.    Will discuss with attending. Pt is a 65 yo F with PMH of metastatic neuroendocrine tumor s/p resection in liver, tricuspid regurg comes to the E with bilateral leg swelling. Pt was admitted to the [facility] in Dec 2019 for similar complaints when she was diagnosed with severe TR and discharged on po lasix and some other medications for carcinoid which she stopped taking. Admitted to [facility] for right sided HF, she was diuresed and now hypotensive. Rapid response called earlier today for unresponsiveness for few seconds with hypotension BP in 70s resolved with Trendelenburg position.    # IMPRESSION  Neuroendocrine tumor with mets to liver s/p resection at [place]  Severe TR  Mod PI  Mild to Mod AI  b/l pleural effusions  ELOINA  Low albumin  HAGMA  Low voltage QRS (can be seen in carcinoid)    # Recommendations  hold diuresis for now given ELOINA and pt is hypotensive  f/u endo recs for metastatic carcinoid heart syndrome; octreotide vs sandostatin  get records from [facility] and Dr Bueno office  Valvular abnormalities likely from secondary carcinoid  Pt will likely need intervention for severe TR once BP and renal function improves.    Will discuss with attending. Pt is a 63 yo F with PMH of metastatic neuroendocrine tumor s/p resection in liver, tricuspid regurg comes to the E with bilateral leg swelling. Pt was admitted to the [facility] in Dec 2019 for similar complaints when she was diagnosed with severe TR and discharged on po lasix and some other medications for carcinoid which she stopped taking. Admitted to [facility] for right sided HF, she was diuresed and now hypotensive. Rapid response called earlier today for unresponsiveness for few seconds with hypotension BP in 70s resolved with Trendelenburg position.    # IMPRESSION  Neuroendocrine tumor with mets to liver s/p resection at [place]  Severe TR  Mod PI  Mild to Mod AI  b/l pleural effusions  ELOINA  Low albumin  HAGMA  Low voltage QRS (can be seen in carcinoid)    # Recommendations  hold diuresis for now given ELOINA and pt is hypotensive  f/u endo recs for metastatic carcinoid heart syndrome; octreotide vs sandostatin  get records from [facility] and Dr Bueno office  Valvular abnormalities likely from secondary carcinoid  Pt will likely need intervention for severe TR once her carcinoid disease is under control with appropriate therapy and after renal function improves.  Can f/u as outpt with CT surgery.

## 2020-02-14 NOTE — DIETITIAN INITIAL EVALUATION ADULT. - ENERGY INTAKE
PO intake very varied since admit. Observed 100% consumption of cereal and lactaid milk at breakfast but pt and staff reporting intake recently 50% at best. Pt reports diarrhea impacting appetite. Requesting nutrition supplement--recs d/w LIP

## 2020-02-14 NOTE — CONSULT NOTE ADULT - SUBJECTIVE AND OBJECTIVE BOX
Patient is a 64y old  Female who presents with a chief complaint of Leg swelling, cellulitis (14 Feb 2020 07:03)    HPI:  Pt is a 63 yo F with PMH of metastatic neuroendocrine tumor s/p resection in liver, tricuspid regurg comes to the E with bilateral leg swelling. Pt was admitted to the [facility] in Dec 2019 for similar complaints when she was diagnosed with severe TR and discharged on po lasix. Pt states that she took the lasix for a few days after discharge but stopped taking it as she was getting lightheaded and dizzy and it didnt agree with her.   Pt states that she has had these leg blisters since discharge but they have gotten worse- to the point that skin started to weep. Pt tried local wound care at home with no improvement. Pt was seen by her PMD and sent to the ED for Iv abx and burn eval.   Denies any fever, chills, headache, SOB, cough.  Pt  has h/o liver neuroendocrine tumor that was resected in 2013 at [facility] followed by embolization procedures. Pt states that she followed up with Dr oscar at [facility] but hasnot been able to see her in a while.   VS on arrival- noted to be stable. labs with chronic macrocytic anemia, BUN/Cr > 20.   VA duplex arterial and venous done in ED- pending read, received cefazolin in ED. (07 Feb 2020 04:00)    Interval History:  Pt admitted for cellulitis also started on lasix for picture of heart failure. Pt with continued diarrhea Cdiff negative started on loperimide thought to be due to carcinoid syndrome (per primary team further info obtained with confirmed carcinoid). Lasix stopped and patient started on midodrine for hypotesion with pt now having persistent episodes of symptomatic hypotension despite current meds.  Discussed with pt her history, notes surgery in 2013 for resection of her primary in the bowel along with liver resection and cholecystectomy and continued follow up at [facility]. She endorses chronic being cold but no chills, no flushing or diaphoresis, chronic diarrhea for 3-4 wks, previous intermittent LE edema improved by lasix with current episode starting about 1-2 wks prior to presentation.   Current workup significant for right sided heart failure with valvular pathology.        FAMILY HISTORY:    PAST MEDICAL & SURGICAL HISTORY:  H/O tricuspid valve disorder  Neuroendocrine cancer  S/P cholecystectomy      REVIEW OF SYSTEMS:    CONSTITUTIONAL: No weakness, fevers or chills  EYES/ENT: No visual changes;  No vertigo or throat pain   NECK: No pain or stiffness  RESPIRATORY: No cough, wheezing, hemoptysis; No shortness of breath  CARDIOVASCULAR: No chest pain or palpitations, + extremity edema  GASTROINTESTINAL: + Diarrhea, No abdominal or epigastric pain. No nausea, vomiting, or hematemesis; No constipation. No melena or hematochezia.  GENITOURINARY: No dysuria, frequency or hematuria  NEUROLOGICAL: No numbness or weakness  SKIN: No itching, See HPI      Allergies    Gluten (Unknown)  lactose (Unknown)  No Known Drug Allergies    Intolerances      MEDICATIONS  (STANDING):  chlorhexidine 4% Liquid 1 Application(s) Topical <User Schedule>  dextrose 5% 1000 milliLiter(s) (100 mL/Hr) IV Continuous <Continuous>  doxycycline hyclate Capsule 100 milliGRAM(s) Oral every 12 hours  enoxaparin Injectable 40 milliGRAM(s) SubCutaneous at bedtime  magnesium oxide 400 milliGRAM(s) Oral two times a day with meals  midodrine. 10 milliGRAM(s) Oral three times a day  silver sulfADIAZINE 1% Cream 1 Application(s) Topical two times a day    MEDICATIONS  (PRN):  loperamide 2 milliGRAM(s) Oral three times a day PRN Diarrhea  oxycodone    5 mG/acetaminophen 325 mG 1 Tablet(s) Oral every 6 hours PRN Severe Pain (7 - 10)      Vital Signs Last 24 Hrs  T(C): 35.6 (14 Feb 2020 04:44), Max: 35.7 (13 Feb 2020 14:24)  T(F): 96.1 (14 Feb 2020 04:44), Max: 96.2 (13 Feb 2020 14:24)  HR: 91 (14 Feb 2020 04:44) (87 - 91)  BP: 98/62 (14 Feb 2020 04:44) (89/53 - 126/83)  BP(mean): --  RR: 18 (14 Feb 2020 04:44) (18 - 18)  SpO2: --      PHYSICAL EXAM  GENERAL: NAD, appears tired,   HEENT: NCAT  CHEST/LUNG: right mid/lower lung crackles  HEART: Regular rate and rhythm; s1 s2 appreciated, Prominent murmurs, No rubs, ?gallop  ABDOMEN: Soft, Nontender, Nondistended; Bowel sounds present  EXTREMITIES: diffuse upper and lower extremity edema  NERVOUS SYSTEM:  Alert & Oriented X3, slow with responses and recall        LABS                          10.9   3.66  )-----------( 201      ( 14 Feb 2020 07:27 )             31.8     02-14    131<L>  |  100  |  51<H>  ----------------------------<  101<H>  5.1<H>   |  14<L>  |  1.8<H>    Ca    8.6      14 Feb 2020 07:27    TPro  5.5<L>  /  Alb  2.6<L>  /  TBili  0.4  /  DBili  x   /  AST  36  /  ALT  24  /  AlkPhos  283<H>  02-14        CAPILLARY BLOOD GLUCOSE    Cortisol AM, Serum (02.10.20 @ 07:03)    Cortisol AM, Serum: 27.9 ug/dL    Serum Pro-Brain Natriuretic Peptide (12.16.19 @ 18:46)    Serum Pro-Brain Natriuretic Peptide: 1614 pg/mL    < from: Transthoracic Echocardiogram (02.08.20 @ 10:04) >  Summary:   1. Left ventricular ejection fraction, by visual estimation, is 60 to 65%.   2. Severely enlarged right ventricle.   3. Severely reduced RV systolic function.   4. Large pleural effusion in the left lateral region.   5. Severe tricuspid regurgitation.   6. Mild aortic regurgitation.   7. Moderate pulmonic valve regurgitation.   8. Estimated pulmonary artery systolic pressure is 46.4 mmHg assuming a right atrial pressure of 15 mmHg, which is consistent with mild pulmonary hypertension.  < end of copied text > Patient is a 64y old  Female who presents with a chief complaint of Leg swelling, cellulitis (14 Feb 2020 07:03)    HPI:  Pt is a 63 yo F with PMH of metastatic neuroendocrine tumor s/p resection in liver, tricuspid regurg comes to the E with bilateral leg swelling. Pt was admitted to the [facility] in Dec 2019 for similar complaints when she was diagnosed with severe TR and discharged on po lasix. Pt states that she took the lasix for a few days after discharge but stopped taking it as she was getting lightheaded and dizzy and it didnt agree with her.   Pt states that she has had these leg blisters since discharge but they have gotten worse- to the point that skin started to weep. Pt tried local wound care at home with no improvement. Pt was seen by her PMD and sent to the ED for Iv abx and burn eval.   Denies any fever, chills, headache, SOB, cough.  Pt  has h/o liver neuroendocrine tumor that was rese[place] in [place] [facility] followed by embolization procedures. Pt states that she followed up with Dr oscar at [facility] but hasnot been able to see her in a while.   VS on arrival- noted to be stable. labs with chronic macrocytic anemia, BUN/Cr > 20.   VA duplex arterial and venous done in ED- pending read, received cefazolin in ED. (07 Feb 2020 04:00)    Interval History:  Pt admitted for cellulitis also started on lasix for picture of heart failure. Pt with continued diarrhea Cdiff negative started on loperimide thought to be due to carcinoid syndrome (per primary team further info obtained with confirmed carcinoid). Lasix stopped and patient started on midodrine for hypotesion with pt now having persistent episodes of symptomatic hypotension despite current meds.  Discussed with pt her history, notes surgery in 2013 for resection of her primary in the bowel along with liver resection and cholecystectomy and continued follow up at [facility]. She endorses chronic being cold but no chills, no flushing or diaphoresis, chronic diarrhea for 3-4 wks, previous intermittent LE edema improved by lasix with current episode starting about 1-2 wks prior to presentation.   Current workup significant for right sided heart failure with valvular pathology.        FAMILY HISTORY:    PAST MEDICAL & SURGICAL HISTORY:  H/O tricuspid valve disorder  Neuroendocrine cancer  S/P cholecystectomy      REVIEW OF SYSTEMS:    CONSTITUTIONAL: No weakness, fevers or chills  EYES/ENT: No visual changes;  No vertigo or throat pain   NECK: No pain or stiffness  RESPIRATORY: No cough, wheezing, hemoptysis; No shortness of breath  CARDIOVASCULAR: No chest pain or palpitations, + extremity edema  GASTROINTESTINAL: + Diarrhea, No abdominal or epigastric pain. No nausea, vomiting, or hematemesis; No constipation. No melena or hematochezia.  GENITOURINARY: No dysuria, frequency or hematuria  NEUROLOGICAL: No numbness or weakness  SKIN: No itching, See HPI      Allergies    Gluten (Unknown)  lactose (Unknown)  No Known Drug Allergies    Intolerances      MEDICATIONS  (STANDING):  chlorhexidine 4% Liquid 1 Application(s) Topical <User Schedule>  dextrose 5% 1000 milliLiter(s) (100 mL/Hr) IV Continuous <Continuous>  doxycycline hyclate Capsule 100 milliGRAM(s) Oral every 12 hours  enoxaparin Injectable 40 milliGRAM(s) SubCutaneous at bedtime  magnesium oxide 400 milliGRAM(s) Oral two times a day with meals  midodrine. 10 milliGRAM(s) Oral three times a day  silver sulfADIAZINE 1% Cream 1 Application(s) Topical two times a day    MEDICATIONS  (PRN):  loperamide 2 milliGRAM(s) Oral three times a day PRN Diarrhea  oxycodone    5 mG/acetaminophen 325 mG 1 Tablet(s) Oral every 6 hours PRN Severe Pain (7 - 10)      Vital Signs Last 24 Hrs  T(C): 35.6 (14 Feb 2020 04:44), Max: 35.7 (13 Feb 2020 14:24)  T(F): 96.1 (14 Feb 2020 04:44), Max: 96.2 (13 Feb 2020 14:24)  HR: 91 (14 Feb 2020 04:44) (87 - 91)  BP: 98/62 (14 Feb 2020 04:44) (89/53 - 126/83)  RR: 18 (14 Feb 2020 04:44) (18 - 18)      PHYSICAL EXAM  GENERAL: NAD, appears tired,   HEENT: NCAT  CHEST/LUNG: right mid/lower lung crackles  HEART: Regular rate and rhythm; s1 s2 appreciated, Prominent murmurs, No rubs, ?gallop  ABDOMEN: Soft, Nontender, Nondistended; Bowel sounds present  EXTREMITIES: diffuse upper and lower extremity edema  NERVOUS SYSTEM:  Alert & Oriented X3, slow with responses and recall        LABS                          10.9   3.66  )-----------( 201      ( 14 Feb 2020 07:27 )             31.8     02-14    131<L>  |  100  |  51<H>  ----------------------------<  101<H>  5.1<H>   |  14<L>  |  1.8<H>    Ca    8.6      14 Feb 2020 07:27    TPro  5.5<L>  /  Alb  2.6<L>  /  TBili  0.4  /  DBili  x   /  AST  36  /  ALT  24  /  AlkPhos  283<H>  02-14        CAPILLARY BLOOD GLUCOSE    Cortisol AM, Serum (02.10.20 @ 07:03)    Cortisol AM, Serum: 27.9 ug/dL    Serum Pro-Brain Natriuretic Peptide (12.16.19 @ 18:46)    Serum Pro-Brain Natriuretic Peptide: 1614 pg/mL    < from: Transthoracic Echocardiogram (02.08.20 @ 10:04) >  Summary:   1. Left ventricular ejection fraction, by visual estimation, is 60 to 65%.   2. Severely enlarged right ventricle.   3. Severely reduced RV systolic function.   4. Large pleural effusion in the left lateral region.   5. Severe tricuspid regurgitation.   6. Mild aortic regurgitation.   7. Moderate pulmonic valve regurgitation.   8. Estimated pulmonary artery systolic pressure is 46.4 mmHg assuming a right atrial pressure of 15 mmHg, which is consistent with mild pulmonary hypertension.  < end of copied text >

## 2020-02-14 NOTE — DIETITIAN INITIAL EVALUATION ADULT. - FEEDING SKILL
Pt providing very limited nutrition history and brief conversation with RD. Reports she has been following gluten and lactose free diet for "quite some time" d/t intolerance. Pt reports "I don't know" when asked about intake PTA, despite multiple explanations and RD questions. ?confused. Pt unsure of supplement use PTA.

## 2020-02-14 NOTE — CONSULT NOTE ADULT - SUBJECTIVE AND OBJECTIVE BOX
HPI:  Pt is a 65 yo F with PMH of metastatic neuroendocrine tumor s/p resection in liver, tricuspid regurg comes to the E with bilateral leg swelling. Pt was admitted to the south [facility], [place], operated by [facility] in Dec 2019 for similar complaints when she was diagnosed with severe TR and discharged on po lasix. Pt states that she took the lasix for a few days after discharge but stopped taking it as she was getting lightheaded and dizzy and it didnt agree with her.   Pt states that she has had these leg blisters since discharge but they have gotten worse- to the point that skin started to weep. Pt tried local wound care at home with no improvement. Pt was seen by her PMD and sent to the ED for Iv abx and burn eval.   Denies any fever, chills, headache, SOB, cough.  Pt  has h/o liver neuroendocrine tumor that was resected in 2013 at [facility] [place] followed by embolization procedures. Pt states that she followed up with Dr oscar at [facility] [place] but hasnot been able to see her in a while.   VS on arrival- noted to be stable. labs with chronic macrocytic anemia, BUN/Cr > 20. (07 Feb 2020 04:00)      Cardio consulted for right sided HF and secondary carcinoid syndrome.    PAST MEDICAL & SURGICAL HISTORY  H/O tricuspid valve disorder  Neuroendocrine cancer  S/P cholecystectomy      FAMILY HISTORY:  FAMILY HISTORY:      SOCIAL HISTORY:  []smoker  []Alcohol  []Drug    ALLERGIES:  Gluten (Unknown)  lactose (Unknown)  No Known Drug Allergies      MEDICATIONS:  MEDICATIONS  (STANDING):  chlorhexidine 4% Liquid 1 Application(s) Topical <User Schedule>  dextrose 5% 1000 milliLiter(s) (100 mL/Hr) IV Continuous <Continuous>  doxycycline hyclate Capsule 100 milliGRAM(s) Oral every 12 hours  enoxaparin Injectable 40 milliGRAM(s) SubCutaneous at bedtime  magnesium oxide 400 milliGRAM(s) Oral two times a day with meals  midodrine. 10 milliGRAM(s) Oral three times a day  multivitamin/minerals 1 Tablet(s) Oral daily  silver sulfADIAZINE 1% Cream 1 Application(s) Topical two times a day    MEDICATIONS  (PRN):  loperamide 2 milliGRAM(s) Oral three times a day PRN Diarrhea  oxycodone    5 mG/acetaminophen 325 mG 1 Tablet(s) Oral every 6 hours PRN Severe Pain (7 - 10)      HOME MEDICATIONS:  Home Medications:      VITALS:   T(F): 96.1 (02-14 @ 04:44), Max: 96.9 (02-11 @ 19:54)  HR: 91 (02-14 @ 04:44) (77 - 95)  BP: 98/62 (02-14 @ 04:44) (52/28 - 126/83)  BP(mean): 61 (02-12 @ 05:05) (61 - 61)  RR: 18 (02-14 @ 04:44) (17 - 18)  SpO2: 95% (02-13 @ 08:46) (95% - 96%)    I&O's Summary    14 Feb 2020 07:01  -  14 Feb 2020 13:18  --------------------------------------------------------  IN: 150 mL / OUT: 0 mL / NET: 150 mL        REVIEW OF SYSTEMS:  CONSTITUTIONAL: see HPI  EYES: No visual changes  ENT: No vertigo or throat pain   NECK: No pain or stiffness  RESPIRATORY: see HPI  CARDIOVASCULAR: see HPI  GASTROINTESTINAL: No abdominal or epigastric pain. No nausea, vomiting, or hematemesis; No diarrhea or constipation. No melena or hematochezia.  GENITOURINARY: No dysuria, frequency or hematuria  NEUROLOGICAL: No numbness or weakness  SKIN: No itching, no rashes  MSK: no    PHYSICAL EXAM:  NEURO: patient is awake, alert and oriented X 3  GEN: Not in acute distress, weak  NECK: no thyroid enlargement, no JVD  LUNGS: Clear to auscultation bilaterally   CARDIOVASCULAR: S1/S2 present, RRR , systolic ejection murmurs 3/6 in pulmonic area and 4/6 right sternal border 4th intercostal space, no rubs, no carotid bruits,  + PP bilaterally  ABD: Soft, non-tender, non-distended, +BS  EXT: LE edema with chronic skin blistering andstasis   SKIN: Intact    LABS:                        10.9   3.66  )-----------( 201      ( 14 Feb 2020 07:27 )             31.8     02-14    131<L>  |  100  |  51<H>  ----------------------------<  101<H>  5.1<H>   |  14<L>  |  1.8<H>    Ca    8.6      14 Feb 2020 07:27    TPro  5.5<L>  /  Alb  2.6<L>  /  TBili  0.4  /  DBili  x   /  AST  36  /  ALT  24  /  AlkPhos  283<H>  02-14              Troponin trend:            RADIOLOGY:  -CXR:  -TTE:  -CCTA:  -STRESS TEST:  -CATHETERIZATION:    ECG:    TELEMETRY EVENTS: HPI:  Pt is a 63 yo F with PMH of metastatic neuroendocrine tumor s/p resection in liver, tricuspid regurg comes to the E with bilateral leg swelling. Pt was admitted to the south [facility] in Dec 2019 for similar complaints when she was diagnosed with severe TR and discharged on po lasix. Pt states that she took the lasix for a few days after discharge but stopped taking it as she was getting lightheaded and dizzy and it didnt agree with her.   Pt states that she has had these leg blisters since discharge but they have gotten worse- to the point that skin started to weep. Pt tried local wound care at home with no improvement. Pt was seen by her PMD and sent to the ED for Iv abx and burn eval.   Denies any fever, chills, headache, SOB, cough.  Pt  has h/o liver neuroendocrine tumor that was resected in 2013 at [facility] [place] followed by embolization procedures. Pt states that she followed up with Dr oscar at [facility] [place] but hasnot been able to see her in a while.   VS on arrival- noted to be stable. labs with chronic macrocytic anemia, BUN/Cr > 20. (07 Feb 2020 04:00)      Cardio consulted for right sided HF and secondary carcinoid syndrome.    PAST MEDICAL & SURGICAL HISTORY  H/O tricuspid valve disorder  Neuroendocrine cancer  S/P cholecystectomy      FAMILY HISTORY:  FAMILY HISTORY:      SOCIAL HISTORY:  []smoker  []Alcohol  []Drug    ALLERGIES:  Gluten (Unknown)  lactose (Unknown)  No Known Drug Allergies      MEDICATIONS:  MEDICATIONS  (STANDING):  chlorhexidine 4% Liquid 1 Application(s) Topical <User Schedule>  dextrose 5% 1000 milliLiter(s) (100 mL/Hr) IV Continuous <Continuous>  doxycycline hyclate Capsule 100 milliGRAM(s) Oral every 12 hours  enoxaparin Injectable 40 milliGRAM(s) SubCutaneous at bedtime  magnesium oxide 400 milliGRAM(s) Oral two times a day with meals  midodrine. 10 milliGRAM(s) Oral three times a day  multivitamin/minerals 1 Tablet(s) Oral daily  silver sulfADIAZINE 1% Cream 1 Application(s) Topical two times a day    MEDICATIONS  (PRN):  loperamide 2 milliGRAM(s) Oral three times a day PRN Diarrhea  oxycodone    5 mG/acetaminophen 325 mG 1 Tablet(s) Oral every 6 hours PRN Severe Pain (7 - 10)      HOME MEDICATIONS:  Home Medications:      VITALS:   T(F): 96.1 (02-14 @ 04:44), Max: 96.9 (02-11 @ 19:54)  HR: 91 (02-14 @ 04:44) (77 - 95)  BP: 98/62 (02-14 @ 04:44) (52/28 - 126/83)  BP(mean): 61 (02-12 @ 05:05) (61 - 61)  RR: 18 (02-14 @ 04:44) (17 - 18)  SpO2: 95% (02-13 @ 08:46) (95% - 96%)    I&O's Summary    14 Feb 2020 07:01  -  14 Feb 2020 13:18  --------------------------------------------------------  IN: 150 mL / OUT: 0 mL / NET: 150 mL        REVIEW OF SYSTEMS:  CONSTITUTIONAL: see HPI  EYES: No visual changes  ENT: No vertigo or throat pain   NECK: No pain or stiffness  RESPIRATORY: see HPI  CARDIOVASCULAR: see HPI  GASTROINTESTINAL: No abdominal or epigastric pain. No nausea, vomiting, or hematemesis; No diarrhea or constipation. No melena or hematochezia.  GENITOURINARY: No dysuria, frequency or hematuria  NEUROLOGICAL: No numbness or weakness  SKIN: No itching, no rashes  MSK: see HPI    PHYSICAL EXAM:  NEURO: patient is awake, alert and oriented X 3  GEN: Not in acute distress, weak & lethargic  NECK: no thyroid enlargement, JVD raised  LUNGS: Clear to auscultation bilaterally   CARDIOVASCULAR: S1/S2 present, RRR , systolic ejection murmurs 3/6 in pulmonic area and 4/6 right sternal border 4th intercostal space, no rubs, no carotid bruits,  + PP bilaterally  ABD: Soft, non-tender, non-distended, +BS  EXT: LE edema with chronic skin blistering and stasis dermatitis changes. LUE swelling   SKIN: Intact    LABS:                        10.9   3.66  )-----------( 201      ( 14 Feb 2020 07:27 )             31.8     02-14    131<L>  |  100  |  51<H>  ----------------------------<  101<H>  5.1<H>   |  14<L>  |  1.8<H>    Ca    8.6      14 Feb 2020 07:27    TPro  5.5<L>  /  Alb  2.6<L>  /  TBili  0.4  /  DBili  x   /  AST  36  /  ALT  24  /  AlkPhos  283<H>  02-14              Troponin trend:            RADIOLOGY:  -CXR:   -TTE: < from: Transthoracic Echocardiogram (02.08.20 @ 10:04) >    Summary:   1. Left ventricular ejection fraction, by visual estimation, is 60 to 65%.   2. Severely enlarged right ventricle.   3. Severely reduced RV systolic function.   4. Large pleural effusion in the left lateral region.   5. Severe tricuspid regurgitation.   6. Mild aortic regurgitation.   7. Moderate pulmonic valve regurgitation.   8. Estimated pulmonary artery systolic pressure is 46.4 mmHg assuming a right atrial pressure of 15 mmHg, which is consistent with mild pulmonary hypertension.    PHYSICIAN INTERPRETATION:  Left Ventricle: Normal left ventricular size and wall thicknesses, with normal systolic and diastolic function. Left ventricular ejection fraction, by visual estimation, is 60 to 65%.  Right Ventricle: The right ventricular size is severely enlarged. RV systolic function is severely reduced.  Left Atrium: Normal left atrial size.  Right Atrium: Severely enlarged right atrium. RA Area is 28.96 cm² cm2.  Pericardium: There is no evidence of pericardial effusion. There is a large pleural effusion in the left lateral region.  Mitral Valve: Structurally normal mitral valve, with normal leaflet excursion. The mitral valve is normal in structure. No evidence of mitral valve regurgitation is seen.  Tricuspid Valve: Structurally normal tricuspid valve, with normal leaflet excursion. The tricuspid valve is normal in structure. Severe tricuspid regurgitation is visualized. Estimated pulmonary artery systolic pressure is 46.4 mmHg assuming a right atrial pressure of 15 mmHg, which is consistent with mild pulmonary hypertension.  Aortic Valve: The aortic valve was not well visualized. The aortic valve is trileaflet. No evidence of aortic stenosis. Mild aortic valve regurgitation is seen.  Pulmonic Valve: The pulmonic valve was not well visualized. The pulmonic valve is thickened with good excursion. Moderate pulmonic valve regurgitation.  Aorta: The aortic root and ascending aorta are structurally normal, with no evidence of dilitation.  Pulmonary Artery: The main pulmonary artery is normal in size.       < end of copied text >    -CCTA:< from: Xray Chest 1 View- PORTABLE-Routine (02.09.20 @ 08:48) >  IMPRESSION:      Increasing bilateral basilar opacities/effusions. Pleural nodules better delineated on recent CT chest.      < end of copied text >    -STRESS TEST:   -CATHETERIZATION:    ECG: < from: 12 Lead ECG (02.09.20 @ 08:46) >  Diagnosis Line Unusual P axis andshort UT, probable junctional tachycardia  Right axis deviation  Incomplete right bundle branch block  Low voltage QRS  Cannot rule out Anteroseptal infarct , age undetermined  Abnormal ECG    < end of copied text >

## 2020-02-14 NOTE — DIETITIAN INITIAL EVALUATION ADULT. - ENERGY NEEDS
estimated calorie needs = 3981-0075 kcal/day (MSJ x1.2-1.3).   estimated protein needs = 78-94 g/day (1.0-1.2 g/kg CBW).   estimated fluid needs = per LIP

## 2020-02-14 NOTE — PROGRESS NOTE ADULT - ASSESSMENT
63 yo F with PMH of metastatic neuroendocrine tumor s/p resection in liver, tricuspid regurg comes to the E with bilateral leg swelling. [place] [facility] for similar complaints when she was diagnosed with severe TR and discharged on po Lasix Pt states that she took the Lasix for a few days after discharge but stopped taking it as she was getting lightheaded and dizzy and it didn't agree with her.   Pt states that she has had these leg blisters since discharge but they have gotten worse- to the point that skin started to weep. Pt tried local wound care at home with no improvement. Pt was seen by her PMD and sent to the ED for Iv abx and burn eval.     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   - fluid restriction 1200 ml in 24 hours, intake and output monitoring, daily weight   - pt having hypotension, will hold Lasix for now, s/p multiple boluses of IVF  - on Midodrine 10mg tid for HD support   - monitor and replete electrolytes (potassium and magnesium)   - pt developed CHF after treatement for cancer   - if patient develops SOB again or becomes hypotensive, will give bolus and might start on dopamine drip with diuretics  - f/u orthostatics, if negative will restart Lasix    # Episodic hypotension possibly secondary to diarrhea and carcinoid syndrome  - orthostatic hypotension, Lasix dose lowered  - TTE showed severe TR  - s/p IVF, on midodrine 10mg tid  - h/o NEM, cortisol level elevated  - f/u orthostatics  - if hypotensive SBP < 80, give bolus.  - c/w NS @75cc  - f/u urine 5HIAA  - consult cardio, endo and heme/onc    # Diarrhea possibly component of carcinoid  - off laxatives, Cdeff negative  - c/w loperamide    # Hypokalemia / hypomagnesemia  - s/p repletion  - f/u CMP    # LE cellulitis/ open wounds   - switched from Zyvox to doxycycline 100mg q12 for 5 days till 2/16 as per ID  - nasal swab for MRSA is negative  - wound cx 2/8 contaminated   - c/w local wound care as per burns, ace wraps  - pain control with percocet  - f/u burns     # h/o Neuroendocrine malignancy with liver MTs  - diagnosed in 2013  - pt is due to follow up in [facility], will see private oncology right after discharge from the hospital   - prognosis guarded     # DVT ppx: Lovenox  # Diet: regular   # Dispo: acute  # Fullcode 65 yo F with PMH of metastatic neuroendocrine tumor s/p resection in liver, tricuspid regurg comes to the E with bilateral leg swelling. Pt [place] [facility] [place] [facility] in Dec [place] for similar complaints when she was diagnosed with severe TR and discharged on po Lasix Pt states that she took the Lasix for a few days after discharge but stopped taking it as she was getting lightheaded and dizzy and it didn't agree with her.   Pt states that she has had these leg blisters since discharge but they have gotten worse- to the point that skin started to weep. Pt tried local wound care at home with no improvement. Pt was seen by her PMD and sent to the ED for Iv abx and burn eval.     # Carcinoid heart disease w/ severe TR, diarrhea and episodic hypotension.  # Severe TR   - pt has history of carcinoid heart disease - records obtained from cardiologist  - CXR shows b/l pleural effusions  - TTE shows LVEF 65%, RV systolic dysfunction, enlarged RV, severe TR and mild pulmonary HTN.   - fluid restriction 1200 ml in 24 hours, intake and output monitoring, daily weight   - pt having hypotension, will hold Lasix for now, s/p multiple boluses of IVF  - on Midodrine 10mg tid for HD support   - monitor and replete electrolytes (potassium and magnesium)   - if patient develops SOB again or becomes hypotensive, will give bolus and might start on dopamine drip with diuretics  - f/u orthostatics, if negative will restart Lasix  - f/u blood serotonin, urine 5HIAA and chromogranin A  - consider starting Sandostatin analogs as per endo  - request for records sent to ocologist and [facility]    # Episodic hypotension possibly secondary to diarrhea and carcinoid syndrome  - orthostatic hypotension, Lasix dose lowered  - TTE showed severe TR  - s/p IVF, on midodrine 10mg tid  - h/o NEM, cortisol level elevated  - f/u orthostatics  - if hypotensive SBP < 80, give bolus.  - c/w NS @75cc  - f/u urine 5HIAA  - consult cardio, endo and heme/onc    # ELOINA secondary to hypotension  - switch fluids from NS to D5 and NaHCO3 @ 100cc  - monitor BMP    # Diarrhea possibly component of carcinoid  - off laxatives, Cdeff negative  - c/w loperamide    # Hypokalemia / hypomagnesemia  - s/p repletion  - f/u CMP    # LE cellulitis/ open wounds   - switched from Zyvox to doxycycline 100mg q12 for 5 days till 2/16 as per ID  - nasal swab for MRSA is negative  - wound cx 2/8 contaminated   - c/w local wound care as per burns, ace wraps  - pain control with percocet  - f/u burns     # h/o Neuroendocrine malignancy with liver MTs  - diagnosed in 2013  - pt is due to follow up in [facility], will see private oncology right after discharge from the hospital   - prognosis guarded     # DVT ppx: Lovenox  # Diet: regular   # Dispo: acute  # Fullcode 65 yo F with PMH of metastatic neuroendocrine tumor s/p resection in liver, tricuspid regurg comes to the E with bilateral leg swelling. Pt [place] [facility] in Dec [facility] for similar complaints when she was diagnosed with severe TR and discharged on po Lasix Pt states that she took the Lasix for a few days after discharge but stopped taking it as she was getting lightheaded and dizzy and it didn't agree with her.   Pt states that she has had these leg blisters since discharge but they have gotten worse- to the point that skin started to weep. Pt tried local wound care at home with no improvement. Pt was seen by her PMD and sent to the ED for Iv abx and burn eval.     # Carcinoid heart disease w/ severe TR, diarrhea and episodic hypotension.  # Severe TR   - pt has history of carcinoid heart disease - records obtained from cardiologist  - CXR shows b/l pleural effusions  - TTE shows LVEF 65%, RV systolic dysfunction, enlarged RV, severe TR and mild pulmonary HTN.   - fluid restriction 1200 ml in 24 hours, intake and output monitoring, daily weight   - pt having hypotension, will hold Lasix for now, s/p multiple boluses of IVF  - on Midodrine 10mg tid for HD support   - monitor and replete electrolytes (potassium and magnesium)   - if patient develops SOB again or becomes hypotensive, will give bolus and might start on dopamine drip  - f/u blood serotonin, urine 5HIAA and chromogranin A  - consider starting Sandostatin or octreotide analogs as per endo  - request for records sent to oncologist and [facility]   - f/u cardio for possible TV repair    # Episodic hypotension possibly secondary to diarrhea and carcinoid syndrome  - orthostatic hypotension, Lasix dose lowered  - TTE showed severe TR  - s/p IVF, on midodrine 10mg tid  - h/o NEM, cortisol level elevated  - f/u orthostatics  - if hypotensive SBP < 80, give bolus.  - c/w NS @75cc  - f/u urine 5HIAA  - consult cardio, endo and heme/onc    # ELOINA secondary to hypotension  - switch fluids from NS to D5 and NaHCO3 @ 100cc  - monitor BMP    # Diarrhea possibly component of carcinoid  - off laxatives, Cdeff negative  - c/w loperamide    # Hypokalemia / hypomagnesemia  - s/p repletion  - f/u CMP    # LE cellulitis/ open wounds   - switched from Zyvox to doxycycline 100mg q12 for 5 days till 2/16 as per ID  - nasal swab for MRSA is negative  - wound cx 2/8 contaminated   - c/w local wound care as per burns, ace wraps  - pain control with percocet  - f/u burns     # h/o Neuroendocrine malignancy with liver MTs  - diagnosed in 2013  - pt is due to follow up in [facility], will see private oncology right after discharge from the hospital   - prognosis guarded     # DVT ppx: Lovenox  # Diet: regular   # Dispo: acute  # Fullcode 63 yo F with PMH of metastatic neuroendocrine tumor s/p resection in liver, tricuspid regurg comes to the E with bilateral leg swelling. [facility] [place] [facility] Dec [place] for similar complaints when she was diagnosed with severe TR and discharged on po Lasix Pt states that she took the Lasix for a few days after discharge but stopped taking it as she was getting lightheaded and dizzy and it didn't agree with her.   Pt states that she has had these leg blisters since discharge but they have gotten worse- to the point that skin started to weep. Pt tried local wound care at home with no improvement. Pt was seen by her PMD and sent to the ED for Iv abx and burn eval.     # Carcinoid heart disease w/ severe TR, diarrhea and episodic hypotension.  # Severe TR   - pt has history of carcinoid heart disease - records obtained from cardiologist  - CXR shows b/l pleural effusions  - TTE shows LVEF 65%, RV systolic dysfunction, enlarged RV, severe TR and mild pulmonary HTN.   - fluid restriction 1200 ml in 24 hours, intake and output monitoring, daily weight   - pt having hypotension, will hold Lasix for now, s/p multiple boluses of IVF  - on Midodrine 10mg tid for HD support   - monitor and replete electrolytes (potassium and magnesium)   - if patient develops SOB again or becomes hypotensive, will give bolus and might start on dopamine drip  - f/u blood serotonin, urine 5HIAA and chromogranin A  - start Sandostatin 100mcg SQ q8 for first 2 weeks and then will switch to long acting dept Sandostatin 20mg IM q 4 weeks for 2 months  - request for records sent to oncologist and [facility]   - f/u cardio for possible TV repair    # Episodic hypotension possibly secondary to diarrhea and carcinoid syndrome  - orthostatic hypotension, Lasix dose lowered  - TTE showed severe TR  - s/p IVF, on midodrine 10mg tid  - h/o NEM, cortisol level elevated  - f/u orthostatics  - if hypotensive SBP < 80, give bolus.  - c/w NS @75cc  - f/u urine 5HIAA  - consult cardio, endo and heme/onc    # ELOINA secondary to hypotension  - switch fluids from NS to D5 and NaHCO3 @ 100cc  - monitor BMP    # Diarrhea possibly component of carcinoid  - off laxatives, Cdeff negative  - c/w loperamide    # Hypokalemia / hypomagnesemia  - s/p repletion  - f/u CMP    # LE cellulitis/ open wounds   - switched from Zyvox to doxycycline 100mg q12 for 5 days till 2/16 as per ID  - nasal swab for MRSA is negative  - wound cx 2/8 contaminated   - c/w local wound care as per burns, ace wraps  - pain control with percocet  - f/u burns     # h/o Neuroendocrine malignancy with liver MTs  - diagnosed in 2013  - pt is due to follow up in [facility], will see private oncology right after discharge from the hospital   - prognosis guarded     # DVT ppx: Lovenox  # Diet: regular   # Dispo: acute  # Fullcode

## 2020-02-14 NOTE — CONSULT NOTE ADULT - SUBJECTIVE AND OBJECTIVE BOX
Patient is a 64y old  Female who presents with a chief complaint of Leg swelling, cellulitis (14 Feb 2020 13:18)      HPI:  Pt is a 65 yo F with PMH of metastatic neuroendocrine tumor s/p resection in liver, tricuspid regurg comes to the E with bilateral leg swelling. Pt was admitted to the [facility] in Dec 2019 for similar complaints when she was diagnosed with severe TR and discharged on po lasix. Pt states that she took the lasix for a few days after discharge but stopped taking it as she was getting lightheaded and dizzy and it didnt agree with her.   Pt states that she has had these leg blisters since discharge but they have gotten worse- to the point that skin started to weep. Pt tried local wound care at home with no improvement. Pt was seen by her PMD and sent to the ED for Iv abx and burn eval.   Denies any fever, chills, headache, SOB, cough.  Pt  has h/o liver neuroendocrine tumor that was resected in 2013 at [facility] [place] followed by embolization procedures. Pt states that she followed up with Dr oscar at [facility] [place] but hasnot been able to see her in a while.   VS on arrival- noted to be stable. labs with chronic macrocytic anemia, BUN/Cr > 20.   VA duplex arterial and venous done in ED- pending read, received cefazolin in ED. (07 Feb 2020 04:00)    Patient has psychomotor slowness, she said she is weak and has shortness of breath. She takes time to answer, her voice is low, does not look in distress         PAST MEDICAL & SURGICAL HISTORY:  H/O tricuspid valve disorder  Neuroendocrine cancer  S/P cholecystectomy      SOCIAL HISTORY:    FAMILY HISTORY:    Allergies    Gluten (Unknown)  lactose (Unknown)  No Known Drug Allergies    Intolerances      ROS:  Negative except for:        Height (cm): 182.88 (02-14-20 @ 12:34)      HOME MEDICATIONS:      Vital Signs Last 24 Hrs  T(C): 35.6 (14 Feb 2020 04:44), Max: 35.7 (13 Feb 2020 21:16)  T(F): 96.1 (14 Feb 2020 04:44), Max: 96.2 (13 Feb 2020 21:16)  HR: 88 (14 Feb 2020 14:30) (88 - 91)  BP: 90/56 (14 Feb 2020 14:30) (89/53 - 98/62)  BP(mean): --  RR: 18 (14 Feb 2020 04:44) (18 - 18)  SpO2: --    PHYSICAL EXAM  General: adult in NAD, NC/AT, anicteric, pale  HEENT: clear oropharynx, anicteric sclera, pink conjunctiva  Neck: supple  CV: normal S1/S2 with no murmur rubs or gallops  Lungs: positive air movement b/l ant lungs, clear to auscultation, no wheezes, no rales  Abdomen: soft non-tender, distended with pitting edema, no hepatosplenomegaly  Ext: Generalized edema in her LE and UE, LE wrapped with ace and binders for the blisters.  Skin: no rashes and no petechiae  Neuro: alert and oriented X 4, no focal deficits. slow mentation, psychomotor retardation    MEDICATIONS  (STANDING):  chlorhexidine 4% Liquid 1 Application(s) Topical <User Schedule>  dextrose 5% 1000 milliLiter(s) (100 mL/Hr) IV Continuous <Continuous>  doxycycline hyclate Capsule 100 milliGRAM(s) Oral every 12 hours  enoxaparin Injectable 40 milliGRAM(s) SubCutaneous at bedtime  midodrine. 10 milliGRAM(s) Oral three times a day  multivitamin/minerals 1 Tablet(s) Oral daily  silver sulfADIAZINE 1% Cream 1 Application(s) Topical two times a day    MEDICATIONS  (PRN):  loperamide 2 milliGRAM(s) Oral three times a day PRN Diarrhea  oxycodone    5 mG/acetaminophen 325 mG 1 Tablet(s) Oral every 6 hours PRN Severe Pain (7 - 10)      LABS:                          10.9   3.66  )-----------( 201      ( 14 Feb 2020 07:27 )             31.8         Mean Cell Volume : 106.4 fL  Mean Cell Hemoglobin : 36.5 pg  Mean Cell Hemoglobin Concentration : 34.3 g/dL  Auto Neutrophil # : 2.77 K/uL  Auto Lymphocyte # : 0.43 K/uL  Auto Monocyte # : 0.45 K/uL  Auto Eosinophil # : 0.00 K/uL  Auto Basophil # : 0.00 K/uL  Auto Neutrophil % : 75.7 %  Auto Lymphocyte % : 11.7 %  Auto Monocyte % : 12.3 %  Auto Eosinophil % : 0.0 %  Auto Basophil % : 0.0 %      Serial CBC's  02-14 @ 07:27  Hct-31.8 / Hgb-10.9 / Plat-201 / RBC-2.99 / WBC-3.66  Serial CBC's  02-13 @ 08:13  Hct-35.1 / Hgb-12.2 / Plat-232 / RBC-3.34 / WBC-4.42  Serial CBC's  02-12 @ 08:27  Hct-32.7 / Hgb-11.6 / Plat-242 / RBC-3.09 / WBC-3.98  Serial CBC's  02-11 @ 06:19  Hct-32.0 / Hgb-11.0 / Plat-242 / RBC-2.96 / WBC-4.25      02-14    131<L>  |  100  |  51<H>  ----------------------------<  101<H>  5.1<H>   |  14<L>  |  1.8<H>    Ca    8.6      14 Feb 2020 07:27    TPro  5.5<L>  /  Alb  2.6<L>  /  TBili  0.4  /  DBili  x   /  AST  36  /  ALT  24  /  AlkPhos  283<H>  02-14      BLOOD SMEAR INTERPRETATION: Not performed

## 2020-02-14 NOTE — CONSULT NOTE ADULT - ASSESSMENT
Pt is a 65 yo F with PMH of metastatic neuroendocrine tumor s/p resection in liver, tricuspid regurgitation admitted for worsening bilateral lower extremity swelling and blisters on the LE    # Metastatic neuroendocrine tumor to the liver, chest wall and ribs, diagnosed in 2013:  - Patient follows up at [facility] [place] with Dr. Sylvia Prieto, says she last saw her doctor in October 2019.  - Per patient, she never received treatment and they were observing the disease with imaging  - Currently she has diarrhea (brown liquid stools with no blood) and shortness of breath, no flushing  - Echo showing EF 60%, severely enlarged RV, severely reduced RV systolic function and large pleural effusion on the left side. Severe TR and moderate PVR. EKG showing low voltage QRS. 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 Cardiology recommendations would be appreciated.  - Will obtain records from [facility] [place] and will contact Dr. Chandan Prieto. If patient wants to continue care there, we will not offer treatment at this moment. However, patient needs treatment with Somatostatin analogs (Sandostatin) to control her current symptoms( diarrhea, shortness of breath).    # Macrocytic anemia:  - Normal B12 and folate  - F/up TSH  - Likely due to liver disease from liver mets ( alk Phos 300)    # ELOINA likely pre-renal:  - C/w hydration and stabilize blood pressure  - C/w midodrine Pt is a 63 yo F with PMH of metastatic neuroendocrine tumor with liver and bone metastasis ,  s/p liver resection , embolization , primary tumor resection from small bowel , chronic diarrhea , intolerant to octreotide ? severe tricuspid regurgitation with intractable right sided heart failure likely secondary to carcinoid heart .  Admitted for worsening bilateral lower extremity swelling and blisters on the LE , episodes of hypotension probably due to intravascular depletion , mild diarrhea ? No flushing . NO resent CT abdomen , CT chest shows multiple chest wall/rib soft tissue masses.     - Echo showing EF 60%, severely enlarged RV, severely reduced RV systolic function and large pleural effusion on the left side. Severe TR and moderate PVR. EKG showing low voltage QRS. 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 Cardiology recommendations would be appreciated.  - Will obtain records from [facility] and will contact Dr. Chandan Prieto. If patient wants to continue care there, we will not offer treatment at this moment.   # Macrocytic anemia: chronic with negative work up .   - Normal B12 and folate  - F/up TSH  Check chromogranin , urine 5HIAA .  consider to resume sandostatin analogues , consider telotristat for diarrhea.     # ELOINA likely pre-renal:  - C/w hydration and stabilize blood pressure  - C/w midodrine

## 2020-02-14 NOTE — DIETITIAN INITIAL EVALUATION ADULT. - FACTORS AFF FOOD INTAKE
diarrhea noted--laxatives stopped and C.diff negative, imodium in place. LBM 2/14. denies difficulty chewing/swallowing.

## 2020-02-14 NOTE — CONSULT NOTE ADULT - ASSESSMENT
65 y/o F with PMHx Metastatic neuroendocrine tumor s/p partial bowel and liver resection, tricuspid regurg comes to the ED with bilateral leg swelling.    # Metastatic neuroendocrine tumor-per primary team pt with known carcinoid syndrome  # Cardiac symptoms suggestive of cardiac carcinoid (low voltage QRS, right sided valvular abnormalities and heart failure)  # ELOINA like due to episodes of hypotension, with resultant metabolic acidosis and hyperkalemia    -can have cardiology review echo and comment on valvular pathology specific to carcinoid (valve thickening, shortened chodae, immobile leaflets)   -c/w loperamide as pt notes improvement in stool consistency    The above is an interim resident provided plan to be discussed with the attending.  Recommendations are not final unless attending co-signature is present. 65 y/o F with PMHx Metastatic neuroendocrine tumor s/p partial bowel and liver resection, tricuspid regurg comes to the ED with bilateral leg swelling.    # Metastatic neuroendocrine tumor-per primary team pt with known carcinoid syndrome  # Cardiac symptoms suggestive of cardiac carcinoid (low voltage QRS, right sided valvular abnormalities and heart failure)  # ELOINA like due to episodes of hypotension, with resultant metabolic acidosis and hyperkalemia      -c/w loperamide as pt notes improvement in stool consistency

## 2020-02-15 NOTE — PROGRESS NOTE ADULT - ASSESSMENT
63 yo F with PMH of metastatic neuroendocrine tumor s/p resection in liver, tricuspid regurg comes to the E with bilateral leg swelling. [place] complaints when she was diagnosed with severe TR and discharged on po Lasix Pt states that she took the Lasix for a few days after discharge but stopped taking it as she was getting lightheaded and dizzy and it didn't agree with her.   Pt states that she has had these leg blisters since discharge but they have gotten worse- to the point that skin started to weep. Pt tried local wound care at home with no improvement. Pt was seen by her PMD and sent to the ED for Iv abx and burn eval.     # Carcinoid heart disease w/ severe TR   # Bilateral Pleural Effusion   # Acute on chronic right sided heart failure   - pt has history of carcinoid heart disease - records obtained from cardiologist  - CXR shows b/l pleural effusions  - TTE shows LVEF 65%, RV systolic dysfunction, enlarged RV, severe TR and mild pulmonary HTN.   - holding Lasix in lieu of ELOINA and hypotension   - c/w Midodrine 10mg tid    - monitor and replete electrolytes (potassium and magnesium)   - discontinue IVF     # Episodic hypotension and diarrhea possibly secondary to carcinoid syndrome  - AM cortisol negative   - c/w imodium, c diff negative   - refusing octreotide due to intolerance (reports RUQ pain)     # ELOINA, possibly ATN due to hypotension - improving   - discontinue bicarb gtt due to anasarca and heart failure   - start PO sodium bicarb   - monitor BMP     # LE cellulitis/ open wounds   - switched from Zyvox to doxycycline 100mg q12 for 5 days till 2/16 as per ID  - nasal swab for MRSA is negative  - wound cx 2/8 contaminated   - c/w local wound care as per burns, ace wraps  - pain control with percocet  - f/u burns     # h/o Neuroendocrine malignancy with liver MTs  - diagnosed in 2013  - pending records from [facility]   - prognosis guarded     # DVT ppx: Lovenox  # Diet: regular   # Dispo: acute  # Fullcode      #Progress Note Handoff  Pending (specify):  cardio f/u, obtain records from [facility], oncology f/u for possible treatment   Family discussion: tina pt plan of care for oncology f/u   Disposition: STR likely

## 2020-02-15 NOTE — CHART NOTE - NSCHARTNOTEFT_GEN_A_CORE
RR called ~530Pm  for acute hypoxia: pt has been satting  pt was on RR called ~530Pm for acute hypoxia and inability to get a manual BP.  pt was mentating well, speaking to team, crackles at bases b/l midaxillary, with anasarca  -pt desatted to 78% on RA, 90's all day, placed on non-rebreather, O2 improve to 98%. multiple attempts for manual BP as pt is edematous,  by doppler. ABG on NRB pH 7.48/32/104/24 1o rsp alkalosis w 2ndary MA, pt was transitioned to bipap 10/5. stat CXR revealed vascular congestion/ b/l pleural effusions (pre-johnson read)    #acute hypoxic resp failure secondary to pulm edema likely sequela carcinoid heart disease w/ severe TR and pulm htn   -bipap for an hour, transition to NC (~7pm) and re-eval   -pulm c/s placed, f/u recs   -consider bipap 4 on 4 off  -f/u official CXR read, will hold off diuresis to avoid hypotension, ionotrope unlikely beneficial, definitive tx as per cardio, TV repair   -repeat CXR in am  -f/u BNP 2000, likely elevated   -strict I/O's, daily wts, continue with fluid restriction   -consider advanced heart failure eval   -spoke w Dr. Taylor RR called ~530Pm for acute hypoxia and inability to get a manual BP.  pt was mentating well, speaking to team, NAD, with anasarca  -pt desatted to 78% on RA, 90's all day, placed on non-rebreather, O2 improve to 98%. multiple attempts for manual BP as pt is edematous,  by doppler. ABG on NRB pH 7.48/32/104/24 1o rsp alkalosis w 2ndary MA, pt was transitioned to bipap 10/5. stat CXR revealed vascular congestion/ b/l pleural effusions (pre-johnson read)    #acute hypoxic resp failure secondary to pulm edema likely sequela carcinoid heart disease w/ severe TR and pulm htn   -bipap for an hour, transition to NC (~7pm) and re-eval   -pulm c/s placed, f/u recs   -f/u official CXR read, will hold off diuresis to avoid hypotension, inotrope unlikely beneficial, definitive tx as per cardio, TV repair   -repeat CXR in am  -strict I/O's, daily wts, continue with fluid restriction   -consider advanced heart failure eval   -spoke w Dr. Taylor: IV lasix challenge: 40mg  -f/u 8pm labs : CBC, BNP, CMP, trops/CKMB, repeat lactate RR called ~530Pm for acute hypoxia and inability to get a manual BP.  pt was mentating well, speaking to team, NAD, with anasarca  -pt desatted to 78% on RA, 90's all day, placed on non-rebreather, O2 improve to 98%. multiple attempts for manual BP as pt is edematous,  by doppler. ABG on NRB pH 7.48/32/104/24 1o rsp alkalosis w 2ndary MA, pt was transitioned to bipap 10/5. stat CXR revealed vascular congestion/ b/l pleural effusions (pre-johnson read)    #acute hypoxic resp failure secondary to pulm edema likely sequela carcinoid heart disease w/ severe TR and pulm htn   -bipap for an hour, transition to NC (~7pm) and re-eval   -pulm c/s placed, f/u recs   -f/u official CXR read, will hold off diuresis to avoid hypotension, inotrope unlikely beneficial, definitive tx as per cardio, TV repair   -repeat CXR in am  -strict I/O's, daily wts, continue with fluid restriction   -consider advanced heart failure eval   -spoke w Dr. Taylor: IV lasix challenge: 40mg  -f/u 8pm labs : CBC, BNP, CMP, trops/CKMB, repeat lactate  Critical Care time pent: 20 mins RR called ~530Pm for acute hypoxia and inability to get a manual BP.  pt was mentating well, speaking to team, NAD, with anasarca  -pt desatted to 78% on RA, 90's all day, placed on non-rebreather, O2 improve to 98%. multiple attempts for manual BP as pt is edematous,  by doppler. ABG on NRB pH 7.48/32/104/24 1o rsp alkalosis w 2ndary MA, pt was transitioned to bipap 10/5. stat CXR revealed vascular congestion/ b/l pleural effusions (pre-johnson read)    #acute hypoxic resp failure secondary to pulm edema likely sequela carcinoid heart disease w/ severe TR and pulm htn   -bipap for an hour, transition to NC (~7pm) and re-eval; bipap PRN   -f/u official CXR read, will hold off diuresis to avoid hypotension, inotrope unlikely beneficial, definitive tx as per cardio, TV repair   -repeat CXR in am  -strict I/O's, daily wts, continue with fluid restriction   -consider advanced heart failure eval   -spoke w Dr. Taylor: IV lasix challenge: 40mg  -f/u 8pm labs : CBC, BNP, CMP, trops/CKMB, repeat lactate  Critical Care time spent: 20 mins RR called ~1715 for acute hypoxia and inability to get a manual BP.  pt was mentating well, speaking to team, NAD, with anasarca  -pt desatted to 78% on RA, 90's all day, placed on non-rebreather, O2 improve to 98%. multiple attempts for manual BP as pt is edematous,  by doppler, P87 RR20 ABG on NRB pH 7.48/32/104/24 lactic 2.4,1o rsp alkalosis w 2ndary MA, pt was transitioned to bipap 10/5. stat CXR revealed vascular congestion/ b/l pleural effusions (pre-johnson read)    #acute hypoxic resp failure secondary to pulm edema likely sequela carcinoid heart disease w/ severe TR and pulm htn   -bipap for an hour, transition to NC (~7pm) and re-eval; bipap PRN   -f/u official CXR read, will hold off diuresis to avoid hypotension, inotrope unlikely beneficial, definitive tx as per cardio, TV repair   -repeat CXR in am  -strict I/O's, daily wts, continue with fluid restriction   -consider advanced heart failure eval   -spoke w Dr. Taylor: IV lasix challenge: 40mg  -f/u 8pm labs : CBC, BNP, CMP, trops/CKMB, repeat lactate  Critical Care time spent: 20 mins RR called ~1715 for acute hypoxia and inability to get a manual BP.  pt was mentating well, speaking to team, NAD, with anasarca  -pt desatted to 78% on RA, was satting 90's all day, placed on non-rebreather, O2 improved to 98%. multiple attempts for manual BP (pt has anasarca),  by doppler, P87 RR20. ABG on NRB pH 7.48/32/104/24 lactic 2.4,1o rsp alkalosis w 2ndary MA, pt was transitioned to bipap 10/5. stat CXR revealed vascular congestion/ b/l pleural effusions (pre-johnson read)    #acute hypoxic resp failure secondary to pulm edema likely sequela carcinoid heart disease w/ severe TR and pulm htn   -bipap for an hour, transition to NC (~7pm) and re-eval; bipap PRN   -f/u official CXR read, will hold off diuresis to avoid hypotension, inotrope unlikely beneficial, definitive tx as per cardio, TV repair   -repeat CXR in am  -strict I/O's, daily wts, continue with fluid restriction   -consider advanced heart failure eval   -spoke w Dr. Taylor: IV lasix challenge: 40mg  -f/u 8pm labs : CBC, BNP, CMP, trops/CKMB, repeat lactate  Critical Care time spent: 20 mins RR called ~1715 for acute hypoxia and inability to get a manual BP.  pt was mentating well, speaking to team, NAD, with anasarca  -pt desatted to 78% on RA, was satting 90's all day, placed on non-rebreather, O2 improved to 98%. multiple attempts for manual BP (pt has anasarca),  by doppler, P87 RR20. ABG on NRB pH 7.48/32/104/24 lactic 2.4,1o rsp alkalosis w 2ndary MA, pt was transitioned to bipap 10/5. stat CXR revealed vascular congestion/ b/l pleural effusions (pre-johnson read)    #acute hypoxic resp failure secondary to pulm edema likely sequela carcinoid heart disease w/ severe TR and pulm htn   -bipap for an hour, transition to NC (~7pm) and re-eval; bipap PRN   -f/u official CXR read  -spoke w Dr. Taylor, IV 40 Lasix challenge, re-evaluate   -strict I/O's, daily wts, continue with fluid restriction   -consider advanced heart failure eval   -f/u 8pm labs : CBC, BNP, CMP, trops/CKMB, repeat lactate  Critical Care time spent: 20 mins RR called ~1715 for acute hypoxia and inability to get a manual BP.  pt was mentating well, speaking to team, NAD, with anasarca  -pt desatted to 78% on RA, was satting 90's all day, placed on non-rebreather, O2 improved to 98%. multiple attempts for manual BP (pt has anasarca),  by doppler, P87 RR20. . ABG on NRB pH 7.48/32/104/24 lactic 2.4,1o rsp alkalosis w 2ndary MA, pt was transitioned to bipap 10/5. stat CXR revealed vascular congestion/ b/l pleural effusions (pre-johnson read)    #acute hypoxic resp failure secondary to pulm edema likely sequela carcinoid heart disease w/ severe TR and pulm htn   -bipap for an hour, transition to NC (~7pm) and re-eval; bipap PRN   -f/u official CXR read  -spoke w Dr. Taylor, IV 40 Lasix challenge, re-evaluate   -strict I/O's, daily wts, continue with fluid restriction   -consider advanced heart failure eval   -f/u 8pm labs : CBC, BNP, CMP, trops/CKMB, repeat lactate  Critical Care time spent: 20 mins

## 2020-02-15 NOTE — PROGRESS NOTE ADULT - SUBJECTIVE AND OBJECTIVE BOX
Pt seen and examined at bedside. Reports RUQ pain, states this is due to the octreotide.  Otherwise feels well and has no complaints. Had one BM today and one overnight.     VITAL SIGNS (Last 24 hrs):  T(C): 36.4 (02-15-20 @ 13:17), Max: 36.4 (02-15-20 @ 13:17)  HR: 88 (02-15-20 @ 13:17) (88 - 91)  BP: 105/41 (02-15-20 @ 13:17) (90/56 - 105/41)  RR: 18 (02-15-20 @ 13:17) (18 - 18)  SpO2: --  Wt(kg): --  Daily     Daily     I&O's Summary    14 Feb 2020 07:01  -  15 Feb 2020 07:00  --------------------------------------------------------  IN: 750 mL / OUT: 0 mL / NET: 750 mL    15 Feb 2020 07:01  -  15 Feb 2020 13:46  --------------------------------------------------------  IN: 400 mL / OUT: 0 mL / NET: 400 mL        PHYSICAL EXAM:  GENERAL: NAD, chronically ill appearing female   HEAD:  Atraumatic, Normocephalic  EYES: conjunctiva and sclera clear  NECK: Supple, No JVD  CHEST/LUNG: Clear to auscultation bilaterally; No wheeze  HEART: Regular rate and rhythm; No murmurs, rubs, or gallops  ABDOMEN: Soft, Nontender, Nondistended; Bowel sounds present  EXTREMITIES:  2+ Peripheral Pulses, No clubbing, cyanosis + anasarca   PSYCH: AAOx3  NEUROLOGY: non-focal     Labs Reviewed  Spoke to patient in regards to abnormal labs.    CBC Full  -  ( 15 Feb 2020 07:46 )  WBC Count : 2.98 K/uL  Hemoglobin : 10.3 g/dL  Hematocrit : 29.1 %  Platelet Count - Automated : 157 K/uL  Mean Cell Volume : 105.8 fL  Mean Cell Hemoglobin : 37.5 pg  Mean Cell Hemoglobin Concentration : 35.4 g/dL  Auto Neutrophil # : 2.19 K/uL  Auto Lymphocyte # : 0.39 K/uL  Auto Monocyte # : 0.38 K/uL  Auto Eosinophil # : 0.01 K/uL  Auto Basophil # : 0.00 K/uL  Auto Neutrophil % : 73.5 %  Auto Lymphocyte % : 13.1 %  Auto Monocyte % : 12.8 %  Auto Eosinophil % : 0.3 %  Auto Basophil % : 0.0 %    BMP:    02-15 @ 07:46    Blood Urea Nitrogen - 53  Calcium - 8.2  Carbon Dioxide - 20  Chloride - 97  Creatinine - 1.5  Glucose - 145  Potassium - 4.4  Sodium - 132       MEDICATIONS  (STANDING):  chlorhexidine 4% Liquid 1 Application(s) Topical <User Schedule>  doxycycline hyclate Capsule 100 milliGRAM(s) Oral every 12 hours  enoxaparin Injectable 40 milliGRAM(s) SubCutaneous at bedtime  loperamide 2 milliGRAM(s) Oral three times a day  midodrine. 10 milliGRAM(s) Oral three times a day  multivitamin/minerals 1 Tablet(s) Oral daily  silver sulfADIAZINE 1% Cream 1 Application(s) Topical two times a day  sodium bicarbonate 325 milliGRAM(s) Oral three times a day    MEDICATIONS  (PRN):  oxycodone    5 mG/acetaminophen 325 mG 1 Tablet(s) Oral every 6 hours PRN Severe Pain (7 - 10)

## 2020-02-16 NOTE — PROGRESS NOTE ADULT - ASSESSMENT
63 yo F with PMH of metastatic neuroendocrine tumor s/p resection in liver, tricuspid regurg comes to the E with bilateral leg swelling. [facility] complaints when she was diagnosed with severe TR and discharged on po Lasix Pt states that she took the Lasix for a few days after discharge but stopped taking it as she was getting lightheaded and dizzy and it didn't agree with her.   Pt states that she has had these leg blisters since discharge but they have gotten worse- to the point that skin started to weep. Pt tried local wound care at home with no improvement. Pt was seen by her PMD and sent to the ED for Iv abx and burn eval.     # Carcinoid heart disease w/ severe TR   # Bilateral Pleural Effusions    # Acute on chronic right sided heart failure   - pt has history of carcinoid heart disease - records obtained from cardiologist  - CXR shows b/l pleural effusions  - TTE shows LVEF 65%, RV systolic dysfunction, enlarged RV, severe TR and mild pulmonary HTN.   - holding Lasix in lieu of ELOINA and hypotension   - c/w Midodrine 10mg tid    - monitor and replete electrolytes (potassium and magnesium)     # Episodic hypotension and diarrhea possibly secondary to carcinoid syndrome  - AM cortisol negative   - c/w imodium, c diff negative   - refusing octreotide due to intolerance (reports RUQ pain)     # ELOINA, possibly ATN due to hypotension - improving   - discontinued bicarb gtt due to anasarca and heart failure   - c/w PO sodium bicarb   - monitor BMP     # LE cellulitis/ open wounds   - switched from Zyvox to doxycycline 100mg q12 for 5 days till 2/16 as per ID  - nasal swab for MRSA is negative  - wound cx 2/8 contaminated   - c/w local wound care as per burns, ace wraps  - pain control with percocet  - f/u burn     # h/o Neuroendocrine malignancy with liver MTs  - diagnosed in 2013  - pending records from [facility]   - prognosis guarded     # DVT ppx: Lovenox  # Diet: regular   # Dispo: acute  # Fullcode - Patient would like all aggressive measures -  appointed as NOK       #Progress Note Handoff  Pending (specify):  cardio f/u, obtain records from [facility], oncology f/u for possible treatment   Family discussion: dw pt plan of care for cardio f/u   Upgraded to CCU.

## 2020-02-16 NOTE — CHART NOTE - NSCHARTNOTEFT_GEN_A_CORE
RR was called for unresponsiveness. RR called ~13:20  for acute unresponsiveness .  pt was mentating well, speaking to team, NAD, with anasarca suddenly she passed out and become unresponsiveness for 2 minutes. then she woke with no post-ictal phase, no confusion.   BP could only be obtained manually by doppler .     f/u ct head, CXR, EEG , ECG will transfer to telemetry .

## 2020-02-16 NOTE — PROGRESS NOTE ADULT - SUBJECTIVE AND OBJECTIVE BOX
Pt seen and examined at bedside. She is alert and awake. Denies any complaints.  at bedside as well. RRT yesterday and today for symptomatic hypotension in the setting of fluid overload.     VITAL SIGNS (Last 24 hrs):  T(C): 36.6 (02-16-20 @ 16:45), Max: 36.7 (02-15-20 @ 21:08)  HR: 101 (02-16-20 @ 16:45) (91 - 105)  BP: 102/- (02-16-20 @ 15:56) (78/42 - 102/-)  RR: 23 (02-16-20 @ 16:45) (18 - 26)  SpO2: 95% (02-16-20 @ 16:45) (95% - 100%)  Wt(kg): --  Daily     Daily     I&O's Summary    15 Feb 2020 07:01  -  16 Feb 2020 07:00  --------------------------------------------------------  IN: 400 mL / OUT: 550 mL / NET: -150 mL        PHYSICAL EXAM:  GENERAL: NAD, chronically ill appearing   HEAD:  Atraumatic, Normocephalic  EYES: EOMI, PERRLA, conjunctiva and sclera clear  NECK: Supple, No JVD  CHEST/LUNG: Clear to auscultation bilaterally; No wheeze  HEART: Regular rate and rhythm; No murmurs, rubs, or gallops  ABDOMEN: Soft, Nontender, Nondistended; Bowel sounds present  EXTREMITIES:  2+ Peripheral Pulses, No clubbing, cyanosis + anasarca   PSYCH: AAOx3  NEUROLOGY: non-focal  SKIN: No rashes or lesions    Labs Reviewed  Spoke to patient in regards to abnormal labs.    CBC Full  -  ( 16 Feb 2020 08:52 )  WBC Count : 5.27 K/uL  Hemoglobin : 10.9 g/dL  Hematocrit : 30.3 %  Platelet Count - Automated : 117 K/uL  Mean Cell Volume : 105.2 fL  Mean Cell Hemoglobin : 37.8 pg  Mean Cell Hemoglobin Concentration : 36.0 g/dL  Auto Neutrophil # : 4.19 K/uL  Auto Lymphocyte # : 0.35 K/uL  Auto Monocyte # : 0.70 K/uL  Auto Eosinophil # : 0.00 K/uL  Auto Basophil # : 0.00 K/uL  Auto Neutrophil % : 79.5 %  Auto Lymphocyte % : 6.6 %  Auto Monocyte % : 13.3 %  Auto Eosinophil % : 0.0 %  Auto Basophil % : 0.0 %    BMP:    02-16 @ 08:52    Blood Urea Nitrogen - 56  Calcium - 8.4  Carbon Dioxide - 21  Chloride - 96  Creatinine - 1.5  Glucose - 89  Potassium - 4.5  Sodium - 131      Hemoglobin A1c -   PT/INR - ( 15 Feb 2020 21:13 )   PT: 11.70 sec;   INR: 1.02 ratio         PTT - ( 15 Feb 2020 21:13 )  PTT:22.6 sec     MEDICATIONS  (STANDING):  chlorhexidine 4% Liquid 1 Application(s) Topical <User Schedule>  doxycycline hyclate Capsule 100 milliGRAM(s) Oral every 12 hours  enoxaparin Injectable 40 milliGRAM(s) SubCutaneous at bedtime  loperamide 2 milliGRAM(s) Oral three times a day  midodrine. 10 milliGRAM(s) Oral three times a day  multivitamin/minerals 1 Tablet(s) Oral daily  silver sulfADIAZINE 1% Cream 1 Application(s) Topical two times a day  sodium bicarbonate 325 milliGRAM(s) Oral three times a day    MEDICATIONS  (PRN):  oxycodone    5 mG/acetaminophen 325 mG 1 Tablet(s) Oral every 6 hours PRN Severe Pain (7 - 10)

## 2020-02-16 NOTE — CHART NOTE - NSCHARTNOTEFT_GEN_A_CORE
65 yo F with PMH of metastatic neuroendocrine tumor s/p resection in liver, tricuspid regurg comes to the E with bilateral leg swelling. RR  called because patient had syncope while speaking with the nurse . she was out for 2 min then regain consciousness with no confusion. Her SBP was 98 detected by doppler due to edema . This is the 3rd rapid in 3 days, previous 2 were for hypoxia and hypotension. Lasix was held on previous days because of hypotension.       Pending:   - fu : CTH to r/o brain mets/ sz , CT chest w/ IV contrast to r/o PE , EEG, ECG, cardiology for TV repair , routine labs   - fu sheetah to check if fluid responsive .         65 yo F with PMH of metastatic neuroendocrine tumor s/p resection in liver, tricuspid regurg comes to the E with bilateral leg swelling. Pt was admitted to the [facility] in Dec 2019 for similar complaints when she was diagnosed with severe TR and discharged on po Lasix Pt states that she took the Lasix for a few days after discharge but stopped taking it as she was getting lightheaded and dizzy and it didn't agree with her.   Pt states that she has had these leg blisters since discharge but they have gotten worse- to the point that skin started to weep. Pt tried local wound care at home with no improvement. Pt was seen by her PMD and sent to the ED for Iv abx and burn eval.     # Carcinoid heart disease w/ severe TR, diarrhea and episodic hypotension.  # Severe TR   - pt has history of carcinoid heart disease - records obtained from cardiologist  - CXR shows b/l pleural effusions  - TTE shows LVEF 65%, RV systolic dysfunction, enlarged RV, severe TR and mild pulmonary HTN.   - fluid restriction 1200 ml in 24 hours, intake and output monitoring, daily weight   - pt having hypotension, will hold Lasix for now, s/p multiple boluses of IVF  - on Midodrine 10mg tid for HD support   - monitor and replete electrolytes (potassium and magnesium)   - if patient develops SOB again or becomes hypotensive, will give bolus and might start on dopamine drip  - f/u blood serotonin, urine 5HIAA and chromogranin A  - start Sandostatin 100mcg SQ q8 for first 2 weeks and then will switch to long acting dept Sandostatin 20mg IM q 4 weeks for 2 months  - request for records sent to oncologist and [facility]   - f/u cardio for possible TV repair    # Episodic hypotension possibly secondary to diarrhea and carcinoid syndrome  - orthostatic hypotension, Lasix dose lowered  - TTE showed severe TR  - s/p IVF, on midodrine 10mg tid  - h/o NEM, cortisol level elevated  - f/u orthostatics  - if hypotensive SBP < 80, give bolus.  - c/w NS @75cc  - f/u urine 5HIAA  - consult cardio, endo and heme/onc    # ELOINA secondary to hypotension  - switch fluids from NS to D5 and NaHCO3 @ 100cc  - monitor BMP    # Diarrhea possibly component of carcinoid  - off laxatives, Cdeff negative  - c/w loperamide    # Hypokalemia / hypomagnesemia  - s/p repletion  - f/u CMP    # LE cellulitis/ open wounds   - switched from Zyvox to doxycycline 100mg q12 for 5 days till 2/16 as per ID  - nasal swab for MRSA is negative  - wound cx 2/8 contaminated   - c/w local wound care as per burns, ace wraps  - pain control with percocet  - f/u burns     # h/o Neuroendocrine malignancy with liver MTs  - diagnosed in 2013  - pt is due to follow up in [facility], will see private oncology right after discharge from the hospital   - prognosis guarded     # DVT ppx: Lovenox  # Diet: regular   # Dispo: acute  # Fullcode

## 2020-02-16 NOTE — CHART NOTE - NSCHARTNOTEFT_GEN_A_CORE
Received signout from resident covering on floor, proceeded to bedside immediately, pt seen on BPAP breathing comfortably, moving all extremities answering questions appropriately, AOx2. Pt on monitor, unable to obtain BP either via cuff or by doppler. Discussed w/ pt and family about need to arterial line to evaluate BP; pt said she would think about it. A short while later called by nurse that pt unresponsive; reported immediately to bedside, pt breathing spontaneously but w/ sluggish responses to stimuli, still unable to ascertain BP. Visible pulsation of b/l carotid arteries as well as pulsation via doppler; unable to evaluate at radial arteries. Pt responding again at baseline w/in 1 min. Agrees to arterial line.

## 2020-02-16 NOTE — CHART NOTE - NSCHARTNOTEFT_GEN_A_CORE
Saw and examined patient earlier in the night - she was resting comfortably in bed, AAO*2, following commands, maintaining acceptable BP and oxygen saturations on bipap. At around 11:10 pm, called by nurse to evaluate patient for acute drop in arterial BP and unresponsiveness. BP at time on monitor from arterial line showing 50/40, BP from L upper arm cuff 70/16, O2 sat 97% on bipap, . Patient was unresponsive to name, sternal rub; pupils sluggishly reactive to light. Carotid pulses palpable bilaterally. Senior resident also at bedside at this time. Levophed ordered immediately, and BP improved to 80/64. Patient was responsive about 3 minutes later, able to respond to questions and follow commands. Additional 18g IV was inserted LUE. Spoke with patient regarding possible need for central line throughout the night if acute change and she provided permission to speak with family regarding event and consent. Spoke with , Titus, at 11:51PM and provided update about wife's status. Informed  of possibility of central line insertion and discussion risks and benefits of central line insertion including pneumothorax and/or infection. Spouse verbalized understanding and consented for procedure if need be. Attempted also to call Fariha [step daughter] at [phone] to discuss events, however was not able to reach her. Will continue to closely monitor patient. Saw and examined patient earlier in the night - she was resting comfortably in bed, AAO*2, following commands, maintaining acceptable BP and oxygen saturations on bipap. At around 11:10 pm, called by nurse to evaluate patient for acute drop in arterial BP and unresponsiveness. BP at time on monitor from arterial line showing 50/40, BP from L upper arm cuff 70/16, O2 sat 97% on bipap, . Patient was unresponsive to name, sternal rub; pupils sluggishly reactive to light. Carotid pulses palpable bilaterally. Senior resident also at bedside at this time. Levophed ordered immediately, and BP improved to 80/64. Patient was responsive about 3 minutes later, able to respond to questions and follow commands. Additional 18g IV was inserted LUE. Patient was complaining of nausea, and subsequently spit up 5mL of green mucus- zofran administered. Spoke with patient regarding possible need for central line throughout the night if acute change and she provided permission to speak with family regarding event and consent. Spoke with , Titus, at 11:51PM and provided update about wife's status. Informed  of possibility of central line insertion and discussion risks and benefits of central line insertion including pneumothorax and/or infection. Spouse verbalized understanding and consented for procedure if need be. Attempted also to call Fariha [step daughter] at [phone] to discuss events, however was not able to reach her. Will continue to closely monitor patient. Saw and examined patient earlier in the night - she was resting comfortably in bed, AAO*2, following commands, maintaining acceptable BP and oxygen saturations on bipap. At around 11:10 pm, called by nurse to evaluate patient for acute drop in arterial BP and unresponsiveness. BP at time on monitor from arterial line showing 50/40, BP from L upper arm cuff 70/16, O2 sat 97% on bipap, . Patient was unresponsive to name, sternal rub; pupils sluggishly reactive to light. Carotid pulses palpable bilaterally. Senior resident also at bedside at this time. Levophed ordered immediately, and BP improved to 80/64. Patient was responsive about 3 minutes later, able to respond to questions and follow commands. Additional 18g IV was inserted LUE. Patient was complaining of nausea, and subsequently spit up 5mL of green mucus- zofran administered. Spoke with patient regarding possible need for central line throughout the night if acute change and she provided permission to speak with family regarding event and consent. Spoke with , Titus, at 11:51PM and provided update about wife's status. Informed  of possibility of central line insertion and discussion risks and benefits of central line insertion including pneumothorax and/or infection. Spouse verbalized understanding and consented for procedure if need be. Will continue to closely monitor patient.    Spoke with Fariha -step daughter- around 12:30AM and provided an update on her step mother's condition. Discussed with Fariha the possible need of central line throughout the night if needed in an emergent situation. She understood the risks and benefits of the procedure and stated she would like us to put in the central line if needed. She can be reached at [phone].

## 2020-02-17 NOTE — CONSULT NOTE ADULT - ASSESSMENT
63 y/o F with h/o metastatic neuroendocrine tumor, severe TR admitted with fluid overload, patient was upgraded to CCU due to AMS, hypotension. Currently, she is intubated on three pressors.

## 2020-02-17 NOTE — AIRWAY PLACEMENT NOTE ADULT - POST AIRWAY PLACEMENT ASSESSMENT:
diminished b/l breath sounds/CXR pending/chest excursion noted/breath sounds bilateral/breath sounds equal/positive end tidal CO2 noted

## 2020-02-17 NOTE — PROGRESS NOTE ADULT - SUBJECTIVE AND OBJECTIVE BOX
Hospital Day:  10d    Subjective:    Patient is a 64y old  Female who presents with a chief complaint of Leg swelling, cellulitis (2020 08:14)    24hr events: pt upgraded to CCU yesterday evening on BPAP after episode of AMS lasting minutes. BP unable to be read by NBP cuff; radial arterial line placed. Pt w/ another episode of AMS after taking off BPAP to talk to family. Overnight, pt w/ further episodes and decreased BPs, as well as vomiting into BPAP mask, so decision was made to intubate pt and start pressor support. This morning, pt requiring increasing pressors. Femoral arterial line placed due to weak signal on radial line. Discussion w/ family about need to swan catheter, they agree.      Past Medical Hx:   H/O tricuspid valve disorder  Neuroendocrine cancer  Cancer    Past Sx:  S/P cholecystectomy  No significant past surgical history    Allergies:  Gluten (Unknown)  lactose (Unknown)  No Known Drug Allergies    Current Meds:   Stand Meds:  ampicillin/sulbactam  IVPB      ampicillin/sulbactam  IVPB 3 Gram(s) IV Intermittent every 6 hours  buMETAnide Infusion 1 mG/Hr (5 mL/Hr) IV Continuous <Continuous>  chlorhexidine 0.12% Liquid 15 milliLiter(s) Oral Mucosa every 12 hours  chlorhexidine 4% Liquid 1 Application(s) Topical <User Schedule>  dexMEDEtomidine Infusion 0.2 MICROgram(s)/kG/Hr (3.955 mL/Hr) IV Continuous <Continuous>  dextrose 5% 1000 milliLiter(s) (100 mL/Hr) IV Continuous <Continuous>  dextrose 50% Injectable 50 milliLiter(s) IV Push once  DOBUTamine Infusion 2.5 MICROgram(s)/kG/Min (2.966 mL/Hr) IV Continuous <Continuous>  doxycycline hyclate Capsule 100 milliGRAM(s) Oral every 12 hours  enoxaparin Injectable 40 milliGRAM(s) SubCutaneous at bedtime  fentaNYL   Infusion. 0.5 MICROgram(s)/kG/Hr (3.955 mL/Hr) IV Continuous <Continuous>  heparin  Infusion 1400 Unit(s)/Hr (14 mL/Hr) IV Continuous <Continuous>  hydrocortisone sodium succinate Injectable 100 milliGRAM(s) IV Push once  loperamide 2 milliGRAM(s) Oral three times a day  midodrine. 10 milliGRAM(s) Oral three times a day  multivitamin/minerals 1 Tablet(s) Oral daily  norepinephrine Infusion 0.05 MICROgram(s)/kG/Min (7.416 mL/Hr) IV Continuous <Continuous>  norepinephrine Infusion 0.05 MICROgram(s)/kG/Min (3.708 mL/Hr) IV Continuous <Continuous>  octreotide  Injectable 500 MICROGram(s) IV Push once  phenylephrine    Infusion 1 MICROgram(s)/kG/Min (14.831 mL/Hr) IV Continuous <Continuous>  silver sulfADIAZINE 1% Cream 1 Application(s) Topical two times a day  sodium bicarbonate 325 milliGRAM(s) Oral three times a day  vasopressin Infusion 0.04 Unit(s)/Min (2.4 mL/Hr) IV Continuous <Continuous>    PRN Meds:  oxycodone    5 mG/acetaminophen 325 mG 1 Tablet(s) Oral every 6 hours PRN Severe Pain (7 - 10)    HOME MEDICATIONS:      Vital Signs:   T(F): 96.7 (20 @ 07:15), Max: 97.9 (20 @ 16:45)  HR: 106 (20 @ 11:00) (88 - 126)  BP: 86/65 (20 @ 03:45) (70/16 - 102/-)  RR: 12 (20 @ 11:00) (6 - 54)  SpO2: 84% (20 @ 11:00) (67% - 100%)      20 @ 07:01  -  20 @ 07:00  --------------------------------------------------------  IN: 1008.2 mL / OUT: 130 mL / NET: 878.2 mL    20 @ 07:01  -  20 @ 11:57  --------------------------------------------------------  IN: 1917.3 mL / OUT: 10 mL / NET: 1907.3 mL        Physical Exam:   GENERAL: NAD; intubated  HEENT: NCAT; LT triple lumen  CHEST/LUNG: CTAB  HEART: Regular rate and rhythm  ABDOMEN: Tense abdomen  EXTREMITIES: 2+ pitting edema in all extremities  NERVOUS SYSTEM:  Sedated        Labs:                         11.2   5.94  )-----------( 118      ( 2020 04:00 )             32.3       2020 04:00    133    |  97     |  63     ----------------------------<  113    4.5     |  22     |  1.5      Ca    8.4        2020 04:00  Mg     2.5       2020 04:00    TPro  5.2    /  Alb  2.5    /  TBili  0.4    /  DBili  x      /  AST  51     /  ALT  21     /  AlkPhos  264    2020 04:00       pTT    32.8             ----< 1.18 INR  (20 @ 07:30)    13.60        PT        Serum Pro-Brain Natriuretic Peptide: 3896 pg/mL (02-15-20 @ 21:13)    Troponin <0.01, CKMB 2.2,  02-15-20 @ 21:13        Urinalysis Basic - ( 2020 09:51 )    Color: Yellow / Appearance: Clear / S.028 / pH: x  Gluc: x / Ketone: Negative  / Bili: Small / Urobili: <2 mg/dL   Blood: x / Protein: 100 mg/dL / Nitrite: Negative   Leuk Esterase: Negative / RBC: 10 /HPF / WBC 17 /HPF   Sq Epi: x / Non Sq Epi: 2 /HPF / Bacteria: Negative            Radiology:      EXAM:  2-D ECHO (TTE) COMPLETE        PROCEDURE DATE:  2020      INTERPRETATION:  REPORT:    TRANSTHORACIC ECHOCARDIOGRAM REPORT         Patient Name:   JANICE COURTNEY Accession #: [id number]  Medical Rec #:  [id number]  Height:      70.0 in 177.8 cm  YOB: 1955 Weight:      176.4 lb 80.00 kg  Patient Age:    64 years   BSA:         1.98 m²  Patient Gender: F          BP:          90/64 mmHg       Date of Exam:        2020 8:34:12 AM  Referring Physician: [id number] WILCOX  Sonographer:         Donald Henry  Reading Physician:   Heather Alejo M.D.    Procedure:     2D Echo/Doppler/Color Doppler Complete.  Indications:   R57.0 - Cardiogenic Shock  Diagnosis:     Cardiogenic shock - R57.0  Study Details: Technically difficult study. Study quality was adversely affected                 due to arrhythmia, lung interference and the patient being                 intubated.         Summary:   1. Left ventricular ejection fraction, by visual estimation, is 55-65%.   2. Normal global left ventricular systolic function.   3. Right ventricular volume and pressure overload.   4. Severely enlarged right atrium.   5. Severely enlarged right ventricle.   6. Severely reduced RV systolic function.   7. Severe, wide-open tricuspid regurgitation.   8. Estimated pulmonary artery systolic pressure is 71.2 mmHg assuming a right atrial pressure of 15 mmHg, which is consistent with severe pulmonary hypertension.   9. Bilateral pleural effusions.    PHYSICIAN INTERPRETATION:  Left Ventricle: The left ventricular internal cavity size is decreased. Global LV systolic function was normal. Left ventricular ejection fraction, by visual estimation, is 55-65%. The interventricular septum is flattened in systole and diastole, consistent with right ventricular pressure and volume overload.  Right Ventricle: The right ventricular size is severely enlarged. RV systolic function is severely reduced.  Left Atrium: Small left atrium.  Right Atrium: Severely enlarged right atrium.  Pericardium: There is no evidence of pericardial effusion.  Mitral Valve: The mitral valve is normal in structure.  Tricuspid Valve: Severe tricuspid regurgitation is visualized. Estimated pulmonary artery systolic pressure is 71.2 mmHg assuming a right atrial pressure of 15 mmHg, which is consistent with severe pulmonary hypertension.  Aortic Valve: The aortic valve is trileaflet. No aortic regurgitation visualized.  Venous: The inferior vena cava was dilated, with respiratory size variation less than 50%, consistent with elevated right atrial pressure.       2D AND M-MODE MEASUREMENTS (normal ranges within parentheses):  Left                  Normal   Aorta/Left             Normal  Ventricle:                     Atrium:  IVSd (2D):  0.77 cm  (0.7-1.1) Left Atrium    2.38  (1.9-4.0)  LVPWd       0.80 cm  (0.7-1.1) (Mmode):        cm  (2D):                          Right  LVIDd       1.99 cm  (3.4-5.7) Ventricle:  (2D):                          RVd (2D):      2.08 cm  LVIDs       1.30 cm  (2D):  LV FS       34.8 %    (>25%)  (2D):  IVSd        0.90 cm  (0.7-1.1)  (Mmode):  LVPWd       0.97 cm  (0.7-1.1)  (Mmode):  LVIDd       2.56 cm  (3.4-5.7)  (Mmode):  LVIDs       1.87 cm  (Mmode):  LV FS       26.8 %    (>25%)  (Mmode):  Relative     0.80     (<0.42)  Wall  Thickness  Rel. Wall    0.76     (<0.42)  Thickness  Mm  LV Mass    29.9 g/m²  Index:  Mmode    SPECTRAL DOPPLER ANALYSIS:  LV DIASTOLIC FUNCTION:  MV Peak E: 1.11 m/s Decel Time: 114 msec  MV Peak A: 0.91 m/s  E/A Ratio: 1.22    Aortic Valve:  AoV VMax:    3.89 m/s  AoV Pk Grad: 60.6 mmHg    LVOT Diam: 1.92 cm    Mitral Valve:  MV P1/2 Time: 33.08 msec  MV Area, PHT: 6.65 cm²    Tricuspid Valve and PA/RV Systolic Pressure: TR Max Velocity: 3.75 m/s RA Pressure: 15 mmHg RVSP/PASP: 71.2 mmHg       [id number] Heather Alejo M.D., Electronically signed on 2020 at 8:35:58 AM              *** Final ***        EXAM:  CT BRAIN            PROCEDURE DATE:  2020            INTERPRETATION:  Clinical History / Reason for exam: Altered Mental Status    Technique: CT head without intravenous contrast. Contiguous unenhanced axial CT images of the head from the base of the skull to the vertex with coronal and sagittal reformats.    Comparison: none available    Correlation: none available     Findings:    Ventricles and cortical sulcal pattern: Atrophy slightly greater than expected for age.    Gray-white differentiation: Normal gray-white differentiation without evidence of recent large territorial infarction. Skull base limited in evaluation due to streak from patient motion.    Lacunar infarcts: None    Hemorrhage: No intracranial hemorrhage.    Mass effect/midline shift: None.    Evidence of chronic microvascular ischemic changes: Scattered areas of low-attenuation within the periventricular and subcortical white matter without mass effect suggestive of mild chronic microvascular ischemic changes.    Bones: No depressed calvarial fracture.    Extracalvarial soft tissues/other: Normal.      IMPRESSION:   1.  No CT evidence of acute intracranial pathology.  2.  Cerebral atrophy slightly greater than expected for age with mild chronic microvascular ischemic changes.          Assessment and Plan:   # Carcinoid heart disease w/ severe TR; RT hear failure;   - Pt has history of carcinoid heart disease - records obtained from cardiologist and oncologist  - TTE shows LVEF 65%, RV systolic dysfunction, enlarged RV, severe TR and mild pulmonary HTN. In context of carcinoid, TR and RV enlargement generally indicated shunt.   - Bedside bubble study confirms RT->LT shunt.   - CXR shows b/l pleural effusions  - Bumex gtt.   - Milrinone gtt.  - Midodrine 10mg TID.   - Currently requiring norepinephrine, vasopressin, dobutamine.   - Heart failure Dr Solomon consulted.   - monitor and replete electrolytes (potassium and magnesium)      # Episodic hypotension and diarrhea possibly secondary to carcinoid syndrome  - AM cortisol negative   - c/w imodium, c diff negative   - octreotide     # ELOINA, possibly ATN due to hypotension - improving   - discontinued bicarb gtt due to anasarca and heart failure   - c/w PO sodium bicarb   - monitor BMP     # LE cellulitis/ open wounds   - switched from Zyvox to doxycycline 100mg q12 for 5 days till  as per ID  - nasal swab for MRSA is negative  - wound cx  contaminated   - c/w local wound care as per burns, ace wraps  - pain control with percocet  - f/u burn     # h/o Neuroendocrine malignancy with liver MTs  - diagnosed in   - Records obtained    - Called Dr Sylvia Hawley [phone] awaiting call back.      # DVT ppx: Lovenox  # Diet: NPO  # Dispo: acute  # Fullcode - Patient would like all aggressive measures -  appointed as proxy, but  has dementia. Stepdaughter Fariha [phone] has been making decision w/ pt's other stepdaughter. Hospital Day:  10d    Subjective:    Patient is a 64y old  Female who presents with a chief complaint of Leg swelling, cellulitis (2020 08:14)    24hr events: pt upgraded to CCU yesterday evening on BPAP after episode of AMS lasting minutes. BP unable to be read by NBP cuff; radial arterial line placed. Pt w/ another episode of AMS after taking off BPAP to talk to family. Overnight, pt w/ further episodes and decreased BPs, as well as vomiting into BPAP mask, so decision was made to intubate pt and start pressor support. This morning, pt requiring increasing pressors. Femoral arterial line placed due to weak signal on radial line. Discussion w/ family about need to swan catheter, they agree.      Past Medical Hx:   H/O tricuspid valve disorder  Neuroendocrine cancer  Cancer    Past Sx:  S/P cholecystectomy  No significant past surgical history    Allergies:  Gluten (Unknown)  lactose (Unknown)  No Known Drug Allergies    Current Meds:   Stand Meds:  ampicillin/sulbactam  IVPB      ampicillin/sulbactam  IVPB 3 Gram(s) IV Intermittent every 6 hours  buMETAnide Infusion 1 mG/Hr (5 mL/Hr) IV Continuous <Continuous>  chlorhexidine 0.12% Liquid 15 milliLiter(s) Oral Mucosa every 12 hours  chlorhexidine 4% Liquid 1 Application(s) Topical <User Schedule>  dexMEDEtomidine Infusion 0.2 MICROgram(s)/kG/Hr (3.955 mL/Hr) IV Continuous <Continuous>  dextrose 5% 1000 milliLiter(s) (100 mL/Hr) IV Continuous <Continuous>  dextrose 50% Injectable 50 milliLiter(s) IV Push once  DOBUTamine Infusion 2.5 MICROgram(s)/kG/Min (2.966 mL/Hr) IV Continuous <Continuous>  doxycycline hyclate Capsule 100 milliGRAM(s) Oral every 12 hours  enoxaparin Injectable 40 milliGRAM(s) SubCutaneous at bedtime  fentaNYL   Infusion. 0.5 MICROgram(s)/kG/Hr (3.955 mL/Hr) IV Continuous <Continuous>  heparin  Infusion 1400 Unit(s)/Hr (14 mL/Hr) IV Continuous <Continuous>  hydrocortisone sodium succinate Injectable 100 milliGRAM(s) IV Push once  loperamide 2 milliGRAM(s) Oral three times a day  midodrine. 10 milliGRAM(s) Oral three times a day  multivitamin/minerals 1 Tablet(s) Oral daily  norepinephrine Infusion 0.05 MICROgram(s)/kG/Min (7.416 mL/Hr) IV Continuous <Continuous>  norepinephrine Infusion 0.05 MICROgram(s)/kG/Min (3.708 mL/Hr) IV Continuous <Continuous>  octreotide  Injectable 500 MICROGram(s) IV Push once  phenylephrine    Infusion 1 MICROgram(s)/kG/Min (14.831 mL/Hr) IV Continuous <Continuous>  silver sulfADIAZINE 1% Cream 1 Application(s) Topical two times a day  sodium bicarbonate 325 milliGRAM(s) Oral three times a day  vasopressin Infusion 0.04 Unit(s)/Min (2.4 mL/Hr) IV Continuous <Continuous>    PRN Meds:  oxycodone    5 mG/acetaminophen 325 mG 1 Tablet(s) Oral every 6 hours PRN Severe Pain (7 - 10)    HOME MEDICATIONS:      Vital Signs:   T(F): 96.7 (20 @ 07:15), Max: 97.9 (20 @ 16:45)  HR: 106 (20 @ 11:00) (88 - 126)  BP: 86/65 (20 @ 03:45) (70/16 - 102/-)  RR: 12 (20 @ 11:00) (6 - 54)  SpO2: 84% (20 @ 11:00) (67% - 100%)      20 @ 07:01  -  20 @ 07:00  --------------------------------------------------------  IN: 1008.2 mL / OUT: 130 mL / NET: 878.2 mL    20 @ 07:01  -  20 @ 11:57  --------------------------------------------------------  IN: 1917.3 mL / OUT: 10 mL / NET: 1907.3 mL        Physical Exam:   GENERAL: NAD; intubated  HEENT: NCAT; LT triple lumen  CHEST/LUNG: CTAB  HEART: Regular rate and rhythm  ABDOMEN: Tense abdomen  EXTREMITIES: 2+ pitting edema in all extremities  NERVOUS SYSTEM:  Sedated        Labs:                         11.2   5.94  )-----------( 118      ( 2020 04:00 )             32.3       2020 04:00    133    |  97     |  63     ----------------------------<  113    4.5     |  22     |  1.5      Ca    8.4        2020 04:00  Mg     2.5       2020 04:00    TPro  5.2    /  Alb  2.5    /  TBili  0.4    /  DBili  x      /  AST  51     /  ALT  21     /  AlkPhos  264    2020 04:00       pTT    32.8             ----< 1.18 INR  (20 @ 07:30)    13.60        PT        Serum Pro-Brain Natriuretic Peptide: 3896 pg/mL (02-15-20 @ 21:13)    Troponin <0.01, CKMB 2.2,  02-15-20 @ 21:13        Urinalysis Basic - ( 2020 09:51 )    Color: Yellow / Appearance: Clear / S.028 / pH: x  Gluc: x / Ketone: Negative  / Bili: Small / Urobili: <2 mg/dL   Blood: x / Protein: 100 mg/dL / Nitrite: Negative   Leuk Esterase: Negative / RBC: 10 /HPF / WBC 17 /HPF   Sq Epi: x / Non Sq Epi: 2 /HPF / Bacteria: Negative            Radiology:      EXAM:  2-D ECHO (TTE) COMPLETE        PROCEDURE DATE:  2020      INTERPRETATION:  REPORT:    TRANSTHORACIC ECHOCARDIOGRAM REPORT         Patient Name:   JANICE COURTNEY Accession #: [id number]  Medical Rec #:  [id number]  Height:      70.0 in 177.8 cm  YOB: 1955 Weight:      176.4 lb 80.00 kg  Patient Age:    64 years   BSA:         1.98 m²  Patient Gender: F          BP:          90/64 mmHg       Date of Exam:        2020 8:34:12 AM  Referring Physician: [id number] WILCOX  Sonographer:         Donald Henry  Reading Physician:   Heather Alejo M.D.    Procedure:     2D Echo/Doppler/Color Doppler Complete.  Indications:   R57.0 - Cardiogenic Shock  Diagnosis:     Cardiogenic shock - R57.0  Study Details: Technically difficult study. Study quality was adversely affected                 due to arrhythmia, lung interference and the patient being                 intubated.         Summary:   1. Left ventricular ejection fraction, by visual estimation, is 55-65%.   2. Normal global left ventricular systolic function.   3. Right ventricular volume and pressure overload.   4. Severely enlarged right atrium.   5. Severely enlarged right ventricle.   6. Severely reduced RV systolic function.   7. Severe, wide-open tricuspid regurgitation.   8. Estimated pulmonary artery systolic pressure is 71.2 mmHg assuming a right atrial pressure of 15 mmHg, which is consistent with severe pulmonary hypertension.   9. Bilateral pleural effusions.    PHYSICIAN INTERPRETATION:  Left Ventricle: The left ventricular internal cavity size is decreased. Global LV systolic function was normal. Left ventricular ejection fraction, by visual estimation, is 55-65%. The interventricular septum is flattened in systole and diastole, consistent with right ventricular pressure and volume overload.  Right Ventricle: The right ventricular size is severely enlarged. RV systolic function is severely reduced.  Left Atrium: Small left atrium.  Right Atrium: Severely enlarged right atrium.  Pericardium: There is no evidence of pericardial effusion.  Mitral Valve: The mitral valve is normal in structure.  Tricuspid Valve: Severe tricuspid regurgitation is visualized. Estimated pulmonary artery systolic pressure is 71.2 mmHg assuming a right atrial pressure of 15 mmHg, which is consistent with severe pulmonary hypertension.  Aortic Valve: The aortic valve is trileaflet. No aortic regurgitation visualized.  Venous: The inferior vena cava was dilated, with respiratory size variation less than 50%, consistent with elevated right atrial pressure.       2D AND M-MODE MEASUREMENTS (normal ranges within parentheses):  Left                  Normal   Aorta/Left             Normal  Ventricle:                     Atrium:  IVSd (2D):  0.77 cm  (0.7-1.1) Left Atrium    2.38  (1.9-4.0)  LVPWd       0.80 cm  (0.7-1.1) (Mmode):        cm  (2D):                          Right  LVIDd       1.99 cm  (3.4-5.7) Ventricle:  (2D):                          RVd (2D):      2.08 cm  LVIDs       1.30 cm  (2D):  LV FS       34.8 %    (>25%)  (2D):  IVSd        0.90 cm  (0.7-1.1)  (Mmode):  LVPWd       0.97 cm  (0.7-1.1)  (Mmode):  LVIDd       2.56 cm  (3.4-5.7)  (Mmode):  LVIDs       1.87 cm  (Mmode):  LV FS       26.8 %    (>25%)  (Mmode):  Relative     0.80     (<0.42)  Wall  Thickness  Rel. Wall    0.76     (<0.42)  Thickness  Mm  LV Mass    29.9 g/m²  Index:  Mmode    SPECTRAL DOPPLER ANALYSIS:  LV DIASTOLIC FUNCTION:  MV Peak E: 1.11 m/s Decel Time: 114 msec  MV Peak A: 0.91 m/s  E/A Ratio: 1.22    Aortic Valve:  AoV VMax:    3.89 m/s  AoV Pk Grad: 60.6 mmHg    LVOT Diam: 1.92 cm    Mitral Valve:  MV P1/2 Time: 33.08 msec  MV Area, PHT: 6.65 cm²    Tricuspid Valve and PA/RV Systolic Pressure: TR Max Velocity: 3.75 m/s RA Pressure: 15 mmHg RVSP/PASP: 71.2 mmHg       [id number] Heather Alejo M.D., Electronically signed on 2020 at 8:35:58 AM              *** Final ***        EXAM:  CT BRAIN            PROCEDURE DATE:  2020            INTERPRETATION:  Clinical History / Reason for exam: Altered Mental Status    Technique: CT head without intravenous contrast. Contiguous unenhanced axial CT images of the head from the base of the skull to the vertex with coronal and sagittal reformats.    Comparison: none available    Correlation: none available     Findings:    Ventricles and cortical sulcal pattern: Atrophy slightly greater than expected for age.    Gray-white differentiation: Normal gray-white differentiation without evidence of recent large territorial infarction. Skull base limited in evaluation due to streak from patient motion.    Lacunar infarcts: None    Hemorrhage: No intracranial hemorrhage.    Mass effect/midline shift: None.    Evidence of chronic microvascular ischemic changes: Scattered areas of low-attenuation within the periventricular and subcortical white matter without mass effect suggestive of mild chronic microvascular ischemic changes.    Bones: No depressed calvarial fracture.    Extracalvarial soft tissues/other: Normal.      IMPRESSION:   1.  No CT evidence of acute intracranial pathology.  2.  Cerebral atrophy slightly greater than expected for age with mild chronic microvascular ischemic changes.          Assessment and Plan:   # Carcinoid heart disease w/ severe TR; RT hear failure  - Pt has history of carcinoid heart disease - records obtained from cardiologist and oncologist  - TTE shows LVEF 65%, RV systolic dysfunction, enlarged RV, severe TR and mild pulmonary HTN. In context of carcinoid, TR and RV enlargement generally indicated shunt.   - Bedside bubble study confirms RT->LT shunt.   - CXR shows b/l pleural effusions  - Bumex gtt.   - Milrinone gtt.  - Midodrine 10mg TID.   - Currently requiring norepinephrine, vasopressin, dobutamine.   - Heart failure Dr Solomon consulted.   - monitor and replete electrolytes (potassium and magnesium)      #Acute hypoxic respiratory failure  - Pt on BPAP when upgraded to CCU; episode of emesis into mask and likely aspiration.   - Intubated, on 100% O2 SpO2 80s.   - Unasyn 3g Q6hrs.     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   - pain control with percocet  - f/u burn     # h/o Neuroendocrine malignancy with liver MTs  - diagnosed in   - Records obtained    - Called Dr Sylvia Hawley [phone] awaiting call back.      # DVT ppx: Lovenox  # Diet: NPO  # Dispo: acute  # Fullcode - Patient would like all aggressive measures -  appointed as proxy, but  has dementia. Stepdaughter Fariha [phone] has been making decision w/ pt's other stepdaughter. Hospital Day:  10d    Subjective:    Patient is a 64y old  Female who presents with a chief complaint of Leg swelling, cellulitis (2020 08:14)    24hr events: pt upgraded to CCU yesterday evening on BPAP after episode of AMS lasting minutes. BP unable to be read by NBP cuff; radial arterial line placed. Pt w/ another episode of AMS after taking off BPAP to talk to family. Overnight, pt w/ further episodes and decreased BPs, as well as vomiting into BPAP mask, so decision was made to intubate pt and start pressor support. This morning, pt requiring increasing pressors. Femoral arterial line placed due to weak signal on radial line. Discussion w/ family about need to swan catheter, they agree.      Past Medical Hx:   H/O tricuspid valve disorder  Neuroendocrine cancer  Cancer    Past Sx:  S/P cholecystectomy  No significant past surgical history    Allergies:  Gluten (Unknown)  lactose (Unknown)  No Known Drug Allergies    Current Meds:   Stand Meds:  ampicillin/sulbactam  IVPB      ampicillin/sulbactam  IVPB 3 Gram(s) IV Intermittent every 6 hours  buMETAnide Infusion 1 mG/Hr (5 mL/Hr) IV Continuous <Continuous>  chlorhexidine 0.12% Liquid 15 milliLiter(s) Oral Mucosa every 12 hours  chlorhexidine 4% Liquid 1 Application(s) Topical <User Schedule>  dexMEDEtomidine Infusion 0.2 MICROgram(s)/kG/Hr (3.955 mL/Hr) IV Continuous <Continuous>  dextrose 5% 1000 milliLiter(s) (100 mL/Hr) IV Continuous <Continuous>  dextrose 50% Injectable 50 milliLiter(s) IV Push once  DOBUTamine Infusion 2.5 MICROgram(s)/kG/Min (2.966 mL/Hr) IV Continuous <Continuous>  doxycycline hyclate Capsule 100 milliGRAM(s) Oral every 12 hours  enoxaparin Injectable 40 milliGRAM(s) SubCutaneous at bedtime  fentaNYL   Infusion. 0.5 MICROgram(s)/kG/Hr (3.955 mL/Hr) IV Continuous <Continuous>  heparin  Infusion 1400 Unit(s)/Hr (14 mL/Hr) IV Continuous <Continuous>  hydrocortisone sodium succinate Injectable 100 milliGRAM(s) IV Push once  loperamide 2 milliGRAM(s) Oral three times a day  midodrine. 10 milliGRAM(s) Oral three times a day  multivitamin/minerals 1 Tablet(s) Oral daily  norepinephrine Infusion 0.05 MICROgram(s)/kG/Min (7.416 mL/Hr) IV Continuous <Continuous>  norepinephrine Infusion 0.05 MICROgram(s)/kG/Min (3.708 mL/Hr) IV Continuous <Continuous>  octreotide  Injectable 500 MICROGram(s) IV Push once  phenylephrine    Infusion 1 MICROgram(s)/kG/Min (14.831 mL/Hr) IV Continuous <Continuous>  silver sulfADIAZINE 1% Cream 1 Application(s) Topical two times a day  sodium bicarbonate 325 milliGRAM(s) Oral three times a day  vasopressin Infusion 0.04 Unit(s)/Min (2.4 mL/Hr) IV Continuous <Continuous>    PRN Meds:  oxycodone    5 mG/acetaminophen 325 mG 1 Tablet(s) Oral every 6 hours PRN Severe Pain (7 - 10)    HOME MEDICATIONS:      Vital Signs:   T(F): 96.7 (20 @ 07:15), Max: 97.9 (20 @ 16:45)  HR: 106 (20 @ 11:00) (88 - 126)  BP: 86/65 (20 @ 03:45) (70/16 - 102/-)  RR: 12 (20 @ 11:00) (6 - 54)  SpO2: 84% (20 @ 11:00) (67% - 100%)      20 @ 07:01  -  20 @ 07:00  --------------------------------------------------------  IN: 1008.2 mL / OUT: 130 mL / NET: 878.2 mL    20 @ 07:01  -  20 @ 11:57  --------------------------------------------------------  IN: 1917.3 mL / OUT: 10 mL / NET: 1907.3 mL        Physical Exam:   GENERAL: NAD; intubated  HEENT: NCAT; LT triple lumen  CHEST/LUNG: CTAB  HEART: Regular rate and rhythm  ABDOMEN: Tense abdomen  EXTREMITIES: 2+ pitting edema in all extremities  NERVOUS SYSTEM:  Sedated        Labs:                         11.2   5.94  )-----------( 118      ( 2020 04:00 )             32.3       2020 04:00    133    |  97     |  63     ----------------------------<  113    4.5     |  22     |  1.5      Ca    8.4        2020 04:00  Mg     2.5       2020 04:00    TPro  5.2    /  Alb  2.5    /  TBili  0.4    /  DBili  x      /  AST  51     /  ALT  21     /  AlkPhos  264    2020 04:00       pTT    32.8             ----< 1.18 INR  (20 @ 07:30)    13.60        PT        Serum Pro-Brain Natriuretic Peptide: 3896 pg/mL (02-15-20 @ 21:13)    Troponin <0.01, CKMB 2.2,  02-15-20 @ 21:13        Urinalysis Basic - ( 2020 09:51 )    Color: Yellow / Appearance: Clear / S.028 / pH: x  Gluc: x / Ketone: Negative  / Bili: Small / Urobili: <2 mg/dL   Blood: x / Protein: 100 mg/dL / Nitrite: Negative   Leuk Esterase: Negative / RBC: 10 /HPF / WBC 17 /HPF   Sq Epi: x / Non Sq Epi: 2 /HPF / Bacteria: Negative            Radiology:      EXAM:  2-D ECHO (TTE) COMPLETE        PROCEDURE DATE:  2020      INTERPRETATION:  REPORT:    TRANSTHORACIC ECHOCARDIOGRAM REPORT         Patient Name:   JANICE COURTNEY Accession #: [id number]  Medical Rec #:  [id number]  Height:      70.0 in 177.8 cm  YOB: 1955 Weight:      176.4 lb 80.00 kg  Patient Age:    64 years   BSA:         1.98 m²  Patient Gender: F          BP:          90/64 mmHg       Date of Exam:        2020 8:34:12 AM  Referring Physician: [id number] WILCOX  Sonographer:         Donald Henry  Reading Physician:   Heather Alejo M.D.    Procedure:     2D Echo/Doppler/Color Doppler Complete.  Indications:   R57.0 - Cardiogenic Shock  Diagnosis:     Cardiogenic shock - R57.0  Study Details: Technically difficult study. Study quality was adversely affected                 due to arrhythmia, lung interference and the patient being                 intubated.         Summary:   1. Left ventricular ejection fraction, by visual estimation, is 55-65%.   2. Normal global left ventricular systolic function.   3. Right ventricular volume and pressure overload.   4. Severely enlarged right atrium.   5. Severely enlarged right ventricle.   6. Severely reduced RV systolic function.   7. Severe, wide-open tricuspid regurgitation.   8. Estimated pulmonary artery systolic pressure is 71.2 mmHg assuming a right atrial pressure of 15 mmHg, which is consistent with severe pulmonary hypertension.   9. Bilateral pleural effusions.    PHYSICIAN INTERPRETATION:  Left Ventricle: The left ventricular internal cavity size is decreased. Global LV systolic function was normal. Left ventricular ejection fraction, by visual estimation, is 55-65%. The interventricular septum is flattened in systole and diastole, consistent with right ventricular pressure and volume overload.  Right Ventricle: The right ventricular size is severely enlarged. RV systolic function is severely reduced.  Left Atrium: Small left atrium.  Right Atrium: Severely enlarged right atrium.  Pericardium: There is no evidence of pericardial effusion.  Mitral Valve: The mitral valve is normal in structure.  Tricuspid Valve: Severe tricuspid regurgitation is visualized. Estimated pulmonary artery systolic pressure is 71.2 mmHg assuming a right atrial pressure of 15 mmHg, which is consistent with severe pulmonary hypertension.  Aortic Valve: The aortic valve is trileaflet. No aortic regurgitation visualized.  Venous: The inferior vena cava was dilated, with respiratory size variation less than 50%, consistent with elevated right atrial pressure.       2D AND M-MODE MEASUREMENTS (normal ranges within parentheses):  Left                  Normal   Aorta/Left             Normal  Ventricle:                     Atrium:  IVSd (2D):  0.77 cm  (0.7-1.1) Left Atrium    2.38  (1.9-4.0)  LVPWd       0.80 cm  (0.7-1.1) (Mmode):        cm  (2D):                          Right  LVIDd       1.99 cm  (3.4-5.7) Ventricle:  (2D):                          RVd (2D):      2.08 cm  LVIDs       1.30 cm  (2D):  LV FS       34.8 %    (>25%)  (2D):  IVSd        0.90 cm  (0.7-1.1)  (Mmode):  LVPWd       0.97 cm  (0.7-1.1)  (Mmode):  LVIDd       2.56 cm  (3.4-5.7)  (Mmode):  LVIDs       1.87 cm  (Mmode):  LV FS       26.8 %    (>25%)  (Mmode):  Relative     0.80     (<0.42)  Wall  Thickness  Rel. Wall    0.76     (<0.42)  Thickness  Mm  LV Mass    29.9 g/m²  Index:  Mmode    SPECTRAL DOPPLER ANALYSIS:  LV DIASTOLIC FUNCTION:  MV Peak E: 1.11 m/s Decel Time: 114 msec  MV Peak A: 0.91 m/s  E/A Ratio: 1.22    Aortic Valve:  AoV VMax:    3.89 m/s  AoV Pk Grad: 60.6 mmHg    LVOT Diam: 1.92 cm    Mitral Valve:  MV P1/2 Time: 33.08 msec  MV Area, PHT: 6.65 cm²    Tricuspid Valve and PA/RV Systolic Pressure: TR Max Velocity: 3.75 m/s RA Pressure: 15 mmHg RVSP/PASP: 71.2 mmHg       [id number] Heather Alejo M.D., Electronically signed on 2020 at 8:35:58 AM              *** Final ***        EXAM:  CT BRAIN            PROCEDURE DATE:  2020            INTERPRETATION:  Clinical History / Reason for exam: Altered Mental Status    Technique: CT head without intravenous contrast. Contiguous unenhanced axial CT images of the head from the base of the skull to the vertex with coronal and sagittal reformats.    Comparison: none available    Correlation: none available     Findings:    Ventricles and cortical sulcal pattern: Atrophy slightly greater than expected for age.    Gray-white differentiation: Normal gray-white differentiation without evidence of recent large territorial infarction. Skull base limited in evaluation due to streak from patient motion.    Lacunar infarcts: None    Hemorrhage: No intracranial hemorrhage.    Mass effect/midline shift: None.    Evidence of chronic microvascular ischemic changes: Scattered areas of low-attenuation within the periventricular and subcortical white matter without mass effect suggestive of mild chronic microvascular ischemic changes.    Bones: No depressed calvarial fracture.    Extracalvarial soft tissues/other: Normal.      IMPRESSION:   1.  No CT evidence of acute intracranial pathology.  2.  Cerebral atrophy slightly greater than expected for age with mild chronic microvascular ischemic changes.          Assessment and Plan:   # Carcinoid heart disease w/ severe TR; RT hear failure  - Pt has history of carcinoid heart disease - records obtained from cardiologist and oncologist  - TTE shows LVEF 65%, RV systolic dysfunction, enlarged RV, severe TR and mild pulmonary HTN. In context of carcinoid, TR and RV enlargement generally indicated shunt.   - Bedside bubble study confirms RT->LT shunt.   - CXR shows b/l pleural effusions  - Bumex gtt.   - Milrinone gtt.  - Midodrine 10mg TID.   - Currently requiring norepinephrine, vasopressin, dobutamine.   - Heart failure Dr Solomon consulted.   - monitor and replete electrolytes (potassium and magnesium)      #Acute hypoxic respiratory failure  - Pt on BPAP when upgraded to CCU; episode of emesis into mask and likely aspiration.   - Intubated, on 100% O2 SpO2 80s.   - Unasyn 3g Q6hrs.     # Episodic hypotension and diarrhea possibly secondary to carcinoid syndrome  - AM cortisol negative   - c/w imodium, c diff negative   - octreotide     # ELOINA, possibly ATN due to hypotension  - discontinued bicarb gtt due to anasarca and heart failure   - monitor BMP     # LE cellulitis/ open wounds   - d/c doxy  - nasal swab for MRSA is negative  - wound cx /8 contaminated   - c/w local wound care as per burns, ace wraps, silver sulfadiazene.   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      # DVT ppx: Lovenox  # Diet: NPO  # Dispo: acute  # Fullcode - Patient would like all aggressive measures -  appointed as proxy, but  has dementia. Stepdaughter Fariha [phone] has been making decision w/ pt's other stepdaughter.

## 2020-02-17 NOTE — PROCEDURE NOTE - NSSITEPREP_SKIN_A_CORE
chlorhexidine
chlorhexidine
Adherence to aseptic technique: hand hygiene prior to donning barriers (gown, gloves), don cap and mask, sterile drape over patient/chlorhexidine
chlorhexidine/Adherence to aseptic technique: hand hygiene prior to donning barriers (gown, gloves), don cap and mask, sterile drape over patient

## 2020-02-17 NOTE — CONSULT NOTE ADULT - ASSESSMENT
IMPRESSION:    Acute hypoxic resp failure/ Severe Pul HTN/ cor pulmonale on 2 pressors  Metastatic NE tumor, s/p liver resection  multiorgan failure      PLAN:    CNS: keep Fentanyl    HEENT:  Oral care    PULMONARY:  HOB @ 45 degrees, echo bubble, bumex drip    CARDIOVASCULAR: dr SANDOVAL, R HEART CATH, ECHO    GI: GI prophylaxis                                          Feeding NPO    RENAL:  F/u  lytes.  Correct as needed. accurate I/O    INFECTIOUS DISEASE: DC DOXY    HEMATOLOGICAL:  DVT prophylaxis.    ENDOCRINE:  Follow up FS.  Insulin protocol if needed.      DISPOSITION: Pt requires continued monitoring in the MICU    PALLIATIVE CARE EVAL

## 2020-02-17 NOTE — PROCEDURE NOTE - NSPOSTCAREGUIDE_GEN_A_CORE
Instructed patient/caregiver regarding signs and symptoms of infection
Care for catheter as per unit/ICU protocols
Care for catheter as per unit/ICU protocols

## 2020-02-17 NOTE — CONSULT NOTE ADULT - SUBJECTIVE AND OBJECTIVE BOX
Patient is a 64y old  Female who presents with a chief complaint of Leg swelling, cellulitis (2020 16:55)      HPI:  Pt is a 65 yo F with PMH of metastatic neuroendocrine tumor s/p resection in liver, tricuspid regurg comes to the ED with bilateral leg swelling. Pt was admitted to the [facility] in Dec 2019 for similar complaints when she was diagnosed with severe TR and discharged on po lasix. Pt states that she took the lasix for a few days after discharge but stopped taking it as she was getting lightheaded and dizzy and it didnt agree with her.   Pt states that she has had these leg blisters since discharge but they have gotten worse- to the point that skin started to weep. Pt tried local wound care at home with no improvement. Pt was seen by her PMD and sent to the ED for Iv abx and burn eval.   Denies any fever, chills, headache, SOB, cough.  Pt  has h/o liver neuroendocrine tumor that was resected in  at [facility] followed by embolization procedures. Pt states that she followed up with Dr oscar at [facility] but hasnot been able to see her in a while.   VS on arrival- noted to be stable. labs with chronic macrocytic anemia, BUN/Cr > 20.   VA duplex arterial and venous done in ED- pending read, received cefazolin in ED. (2020 04:00), was admitted for LE cellulitis/ swelling. on the floor, ? syncope, upgraded CCU, s/p luz maria, dec BP, was on BIPAP vomited,? aspirated was intubated, on levophed 3.5 , vasopressin, fentanyl, bicarb drip 3 am, 1 l d5 w 70 cc/h, 5 cc / h UO      PAST MEDICAL & SURGICAL HISTORY:  H/O tricuspid valve disorder  Neuroendocrine cancer  S/P cholecystectomy      SOCIAL HX:   Smoking  -    FAMILY HISTORY: UTO      REVIEW OF SYSTEMS UTO        Allergies    Gluten (Unknown)  lactose (Unknown)  No Known Drug Allergies    Intolerances        ampicillin/sulbactam  IVPB      ampicillin/sulbactam  IVPB 3 Gram(s) IV Intermittent every 6 hours  chlorhexidine 0.12% Liquid 15 milliLiter(s) Oral Mucosa every 12 hours  chlorhexidine 4% Liquid 1 Application(s) Topical <User Schedule>  dexMEDEtomidine Infusion 0.2 MICROgram(s)/kG/Hr IV Continuous <Continuous>  dextrose 5% 1000 milliLiter(s) IV Continuous <Continuous>  doxycycline hyclate Capsule 100 milliGRAM(s) Oral every 12 hours  enoxaparin Injectable 40 milliGRAM(s) SubCutaneous at bedtime  fentaNYL   Infusion. 0.5 MICROgram(s)/kG/Hr IV Continuous <Continuous>  heparin  Infusion 1400 Unit(s)/Hr IV Continuous <Continuous>  loperamide 2 milliGRAM(s) Oral three times a day  midodrine. 10 milliGRAM(s) Oral three times a day  multivitamin/minerals 1 Tablet(s) Oral daily  norepinephrine Infusion 0.05 MICROgram(s)/kG/Min IV Continuous <Continuous>  norepinephrine Infusion 0.05 MICROgram(s)/kG/Min IV Continuous <Continuous>  oxycodone    5 mG/acetaminophen 325 mG 1 Tablet(s) Oral every 6 hours PRN  silver sulfADIAZINE 1% Cream 1 Application(s) Topical two times a day  sodium bicarbonate 325 milliGRAM(s) Oral three times a day  vasopressin Infusion 0.04 Unit(s)/Min IV Continuous <Continuous>  : Home Meds:      PHYSICAL EXAM    ICU Vital Signs Last 24 Hrs  T(C): 36.2 (2020 20:00), Max: 36.6 (2020 12:59)  T(F): 97.1 (2020 20:00), Max: 97.9 (2020 16:45)  HR: 120 (2020 07:30) (88 - 126)  BP: 86/65 (2020 03:45) (70/16 - 102/-)  BP(mean): 78 (2020 03:45) (31 - 78)  ABP: 84/62 (2020 07:30) (46/38 - 112/78)  ABP(mean): 70 (2020 07:30) (42 - 168)  RR: 17 (2020 07:30) (6 - 54)  SpO2: 91% (2020 07:30) (67% - 100%)      General: SEDATED, anasarca  HEENT:  ISAÍAS              Lymph Nodes: No cervical LN   Lungs: Bilateral BS, dec BS both bases  Cardiovascular: Regular  Abdomen: Soft, Positive BS, anasarca  Extremities: No clubbing  Skin: Warm  Neurological: swelling +      02-16-20 @ 07:01  -  20 @ 07:00  --------------------------------------------------------  IN:    dextrose 5%: 140 mL    fentaNYL Infusion.: 39.4 mL    norepinephrine Infusion: 444 mL    norepinephrine Infusion: 384.8 mL  Total IN: 1008.2 mL    OUT:    Indwelling Catheter - Urethral: 130 mL  Total OUT: 130 mL    Total NET: 878.2 mL          LABS:                          11.2   5.94  )-----------( 118      ( 2020 04:00 )             32.3                                                   133<L>  |  97<L>  |  63<HH>  ----------------------------<  113<H>  4.5   |  22  |  1.5    Ca    8.4<L>      2020 04:00  Mg     2.5         TPro  5.2<L>  /  Alb  2.5<L>  /  TBili  0.4  /  DBili  x   /  AST  51<H>  /  ALT  21  /  AlkPhos  264<H>        PT/INR - ( 2020 07:30 )   PT: 13.60 sec;   INR: 1.18 ratio         PTT - ( 2020 07:30 )  PTT:32.8 sec                                       Urinalysis Basic - ( 2020 09:51 )    Color: Yellow / Appearance: Clear / S.028 / pH: x  Gluc: x / Ketone: Negative  / Bili: Small / Urobili: <2 mg/dL   Blood: x / Protein: 100 mg/dL / Nitrite: Negative   Leuk Esterase: Negative / RBC: 10 /HPF / WBC 17 /HPF   Sq Epi: x / Non Sq Epi: 2 /HPF / Bacteria: Negative        CARDIAC MARKERS ( 15 Feb 2020 21:13 )  x     / <0.01 ng/mL / 209 U/L / x     / 2.2 ng/mL                                            LIVER FUNCTIONS - ( 2020 04:00 )  Alb: 2.5 g/dL / Pro: 5.2 g/dL / ALK PHOS: 264 U/L / ALT: 21 U/L / AST: 51 U/L / GGT: x                                                                                               Mode: AC/ CMV (Assist Control/ Continuous Mandatory Ventilation)  RR (machine): 16  TV (machine): 450  FiO2: 100  PEEP: 7.5  ITime: 1  MAP: 15  PIP: 27                                      ABG - ( 2020 06:20 )  pH, Arterial: 7.28  pH, Blood: x     /  pCO2: 44    /  pO2: 77    / HCO3: 21    / Base Excess: -5.9  /  SaO2: 93                  X-Rays   reviewed    MEDICATIONS  (STANDING):  ampicillin/sulbactam  IVPB      ampicillin/sulbactam  IVPB 3 Gram(s) IV Intermittent every 6 hours  chlorhexidine 0.12% Liquid 15 milliLiter(s) Oral Mucosa every 12 hours  chlorhexidine 4% Liquid 1 Application(s) Topical <User Schedule>  dexMEDEtomidine Infusion 0.2 MICROgram(s)/kG/Hr (3.955 mL/Hr) IV Continuous <Continuous>  dextrose 5% 1000 milliLiter(s) (70 mL/Hr) IV Continuous <Continuous>  doxycycline hyclate Capsule 100 milliGRAM(s) Oral every 12 hours  enoxaparin Injectable 40 milliGRAM(s) SubCutaneous at bedtime  fentaNYL   Infusion. 0.5 MICROgram(s)/kG/Hr (3.955 mL/Hr) IV Continuous <Continuous>  heparin  Infusion 1400 Unit(s)/Hr (14 mL/Hr) IV Continuous <Continuous>  loperamide 2 milliGRAM(s) Oral three times a day  midodrine. 10 milliGRAM(s) Oral three times a day  multivitamin/minerals 1 Tablet(s) Oral daily  norepinephrine Infusion 0.05 MICROgram(s)/kG/Min (7.416 mL/Hr) IV Continuous <Continuous>  norepinephrine Infusion 0.05 MICROgram(s)/kG/Min (3.708 mL/Hr) IV Continuous <Continuous>  silver sulfADIAZINE 1% Cream 1 Application(s) Topical two times a day  sodium bicarbonate 325 milliGRAM(s) Oral three times a day  vasopressin Infusion 0.04 Unit(s)/Min (2.4 mL/Hr) IV Continuous <Continuous>    MEDICATIONS  (PRN):  oxycodone    5 mG/acetaminophen 325 mG 1 Tablet(s) Oral every 6 hours PRN Severe Pain (7 - 10)

## 2020-02-17 NOTE — PROCEDURE NOTE - ADDITIONAL PROCEDURE DETAILS
swan caitlin was inserted , pressure measured in RA,RVR and PA  wedge was obtained at 45cm.  Elizabeth kept in place for monitoring of PA pressure.

## 2020-02-17 NOTE — CONSULT NOTE ADULT - SUBJECTIVE AND OBJECTIVE BOX
65 y/o F with h/o metastatic neuroendocrine tumo s/p liver resection, severe TR admitted with volume overload, upgraded to CCU due to altered mental status, hypotension, currently on three pressors. She follows up at [facility] for management of NE tumor.       PMH: metastatic neuroendocrine tumor, severe TR       ROS: Unable to obtain as patient is intubated and sedated         Physical exam    General: Sedated, intubated    Eyes: Clear eyes   ENT: No ear discharge  Neck: Trachea is central, unable to assess JVD  Lungs: CTA, no crackles on anterior auscultation   Heart: Regular heart sounds, no murmurs   GI: Abdomen is soft, NT  Neuro: Normal strength  Psych: Calm affect   Integument: No skin bruises

## 2020-02-17 NOTE — PROCEDURE NOTE - NSINDICATIONS_GEN_A_CORE
critical illness
critical illness
monitoring purposes/critical patient
monitoring purposes/critical patient

## 2020-02-17 NOTE — PROCEDURE NOTE - ADDITIONAL PROCEDURE DETAILS
Patient was hypotensive and required accurate arterial line bp monitoring. Family called with no answer. Procedure was emergent and was performed with no complication. Patient had mild cyanosis on toes with an ulcer Patient was hypotensive and required accurate arterial line bp monitoring. Family called with no answer. Procedure was emergent and was performed with no complication. Patient had cyanosis on toes with an ulcer on the hallux toe prior to procedure.

## 2020-02-17 NOTE — CONSULT NOTE ADULT - PROBLEM SELECTOR RECOMMENDATION 9
Likely secondary to RV failure/ severe TR/ pulmonary hypertension  Continue pressor support with norepinephrine and vasopressin   Keep MAP > 70 mmHg   PH 7.1 - consider bicarb drip  / avoid hypotension    On sedation with fentanyl gtt   Continue management of sedation and mechanical ventilation per Pulm  Monitor lactic acid and ABG every 6 hrs   Discussed with CCU team at bedside

## 2020-02-17 NOTE — CONSULT NOTE ADULT - PROBLEM SELECTOR RECOMMENDATION 2
Likely related to severe TR/pulmonary hypertension   PA pressures 53/17/30   CVP 16 with CV wave up to 40s  Continue pressors support as above   DC milrinone  Give Bumex 2 mg ivp and start Bumex gtt at 1 mg/hr   If urine output doesn't improve in one hour, give metolazone 10 mg once   Strict I/Os   Daily weight   If no improvement in acidosis and hemodynamics in the next few hours, would need to consider mechanical support   Discussed with CCU team at bedside   Prognosis guarded

## 2020-02-17 NOTE — PROCEDURE NOTE - NSPROCDETAILS_GEN_ALL_CORE
guidewire recovered/sterile dressing applied/sterile technique, catheter placed/lumen(s) aspirated and flushed/ultrasound guidance
guidewire recovered/sterile dressing applied/sterile technique, catheter placed/ultrasound guidance/lumen(s) aspirated and flushed
connected to a pressurized flush line/hemostasis with direct pressure, dressing applied/Seldinger technique/positive blood return obtained via catheter/ultrasound guidance/location identified, draped/prepped, sterile technique used, needle inserted/introduced/all materials/supplies accounted for at end of procedure/sutured in place
sutured in place/connected to a pressurized flush line/hemostasis with direct pressure, dressing applied/ultrasound guidance/location identified, draped/prepped, sterile technique used, needle inserted/introduced/positive blood return obtained via catheter/Seldinger technique/all materials/supplies accounted for at end of procedure

## 2020-02-17 NOTE — CHART NOTE - NSCHARTNOTEFT_GEN_A_CORE
Was called by nurse around 3:40AM to evaluate patient after an episode of vomiting while on bipap, as well as acute episode of hypotension. BP from arterial line was 64/52; pt initially saturating 96% on nasal canula;  at this time.   Patient soon became unresponsive to verbal stimuli and sternal rub. Levophed was increased immediately to 2.0. Anesthesia was called for emergent intubation as patient began to desaturate on nasal canula and non-rebreather.  She was unable to protect her airway and became tachypneic.      Central line placed.    Patient had a pulse with heart rate ranging from .  Bedside US by PGY II-III showed collapsed left ventricle.       Assessment:  Acute hypoxic respiratory Failure  Shock secondary to ?? cardiogenic?      Plan  CNS: fentanyl and precedex    ENT: Oral care    Cardiovascular:  Levophed. Keep map > 65.  Central line in place    Pulmonary: ET tube in place.  Aspiration precautions  Vent settings: , FiO2 100, RR 16, PEEP 5.    ABG:    pH, Arterial: 7.16    pCO2, Arterial: 48 mmHg    pO2, Arterial: 69 mmHg    HCO3, Arterial: 17 mmoL/L    Base Excess, Arterial: -11.1 mmoL/L    Oxygen Saturation, Arterial: 86 %  Continue with the current settings and Repeat ABG in 2 hours      GI: PPI ppx. Oral feeds in AM today    Renal: Monitor UOP. Monitor lytes    ID: monitor off abx    Heme-Onc:  DVT ppx. Monitor CBC.  F/u endocrinetumor markers    Endo:  check fingersticks.      Prognosis Poor  Spoke with step daughter, Fariha, at length.  Full code at this time. PGY III is aware. Was called by nurse around 3:40AM to evaluate patient after an episode of vomiting while on bipap, as well as acute episode of hypotension. BP from arterial line was 64/52; pt initially saturating 96% on nasal canula;  at this time.   Patient soon became unresponsive to verbal stimuli and sternal rub. Levophed was increased immediately to 2.0. Anesthesia was called for emergent intubation as patient began to desaturate on nasal canula and non-rebreather.  She was unable to protect her airway and became tachypneic.      Central line placed.    Patient had a pulse with heart rate ranging from .  Bedside US by PGY II-III showed collapsed left ventricle.       Assessment:  Acute hypoxic respiratory Failure  Shock secondary to ?? cardiogenic?      Plan  CNS: fentanyl and precedex    ENT: Oral care    Cardiovascular:  Levophed. Keep map > 65.  Central line in place    Pulmonary: ET tube in place.  Aspiration precautions  Vent settings: , FiO2 100, RR 16, PEEP 5.    ABG:    pH, Arterial: 7.16    pCO2, Arterial: 48 mmHg    pO2, Arterial: 69 mmHg    HCO3, Arterial: 17 mmoL/L    Base Excess, Arterial: -11.1 mmoL/L    Oxygen Saturation, Arterial: 86 %  Continue with the current settings and Repeat ABG in 2 hours      GI: PPI ppx. Hold Oral feeds in light of recent aspiration    Renal: Monitor UOP. Monitor lytes    ID: start Unasyn in light of recent aspiration    Heme-Onc:  DVT ppx. Monitor CBC.  F/u endocrinetumor markers    Endo:  check fingersticks.      Prognosis Poor  Spoke with step daughter, Fariha, at length.  Full code at this time. PGY III is aware. Was called by nurse around 3:40AM to evaluate patient after an episode of vomiting while on bipap, as well as acute episode of hypotension. BP from arterial line was 64/52 on levophed 0.5 mcg/kg/hr; pt initially saturating 96% on nasal canula;  at this time.   Patient soon became unresponsive to verbal stimuli and sternal rub. Levophed was increased immediately to 2.0. Anesthesia was called for emergent intubation as patient began to desaturate on nasal canula and non-rebreather.  She was unable to protect her airway and became tachypneic.   Sterile Central line placed.    Patient had a pulse with heart rate ranging from .  Bedside US by PGY II-III showed collapsed left ventricle.       Assessment:  Acute hypoxic respiratory Failure  Shock secondary to ?? cardiogenic?      Plan  CNS: fentanyl and precedex    ENT: Oral care    Cardiovascular:  Levophed. Keep map > 65.  Central line in place    Pulmonary: ET tube in place.  Aspiration precautions  Vent settings: , FiO2 100, RR 16, PEEP 5.    ABG:    pH, Arterial: 7.16    pCO2, Arterial: 48 mmHg    pO2, Arterial: 69 mmHg    HCO3, Arterial: 17 mmoL/L    Base Excess, Arterial: -11.1 mmoL/L    Oxygen Saturation, Arterial: 86 %  Continue with the current settings and Repeat ABG in 2 hours      GI: PPI ppx. Hold Oral feeds in light of recent aspiration    Renal: Monitor UOP. Monitor lytes    ID: start Unasyn in light of recent aspiration    Heme-Onc:  DVT ppx. Monitor CBC.  F/u endocrinetumor markers    Endo:  check fingersticks.      Prognosis Poor  Spoke with step daughter, Fariha, at length.  Full code at this time. PGY III is aware. Was called by nurse around 3:40AM to evaluate patient after an episode of vomiting while on bipap, as well as acute episode of hypotension. BP from arterial line was 64/52 on levophed 0.5 mcg/kg/hr; pt initially saturating 96% on nasal canula;  at this time.   Patient soon became unresponsive to verbal stimuli and sternal rub. Levophed was increased immediately to 2.0. Anesthesia was called for emergent intubation as patient began to desaturate on nasal canula and non-rebreather.  She was unable to protect her airway and became tachypneic with use of respiratory muscles.   Sterile Central line placed.    Patient had a pulse with heart rate ranging from .  Bedside US by PGY II-III showed collapsed left ventricle.       Assessment:  Acute hypoxic respiratory Failure  Shock secondary to ?? cardiogenic?      Plan  CNS: fentanyl and precedex    ENT: Oral care    Cardiovascular:  Levophed. Keep map > 65.  Central line in place    Pulmonary: ET tube in place.  Aspiration precautions  Vent settings: , FiO2 100, RR 16, PEEP 5.    ABG:    pH, Arterial: 7.16    pCO2, Arterial: 48 mmHg    pO2, Arterial: 69 mmHg    HCO3, Arterial: 17 mmoL/L    Base Excess, Arterial: -11.1 mmoL/L    Oxygen Saturation, Arterial: 86 %  Continue with the current settings and Repeat ABG in 2 hours      GI: PPI ppx. Hold Oral feeds in light of recent aspiration    Renal: Monitor UOP. Monitor lytes    ID: start Unasyn in light of recent aspiration    Heme-Onc:  DVT ppx. Monitor CBC.  F/u endocrinetumor markers    Endo:  check fingersticks.      Prognosis Poor  Spoke with step daughter, Fariha, at length.  Full code at this time. PGY III is aware. Was called by nurse around 3:40AM to evaluate patient after an episode of vomiting while on bipap, as well as acute episode of hypotension. BP from arterial line was 64/52 on levophed 0.5 mcg/kg/hr; pt initially saturating 96% on nasal canula;  at this time.   Patient soon became unresponsive to verbal stimuli and sternal rub. Levophed was increased immediately to 2.0. Anesthesia was called for emergent intubation as patient began to desaturate on nasal canula and non-rebreather.  She was unable to protect her airway and became tachypneic with use of respiratory muscles.   Sterile Central line placed.    Patient had a pulse with heart rate ranging from .  Bedside US by PGY II-III showed collapsed left ventricle.       Assessment:  Acute hypoxic respiratory Failure  Shock secondary to ?? cardiogenic?      Plan  CNS: fentanyl and precedex    ENT: Oral care    Cardiovascular:  Levophed. Keep map > 65.  Central line in place    Pulmonary: ET tube in place.  Aspiration precautions  Vent settings: , FiO2 100, RR 16, PEEP 5.    ABG:    pH, Arterial: 7.16    pCO2, Arterial: 48 mmHg    pO2, Arterial: 69 mmHg    HCO3, Arterial: 17 mmoL/L    Base Excess, Arterial: -11.1 mmoL/L    Oxygen Saturation, Arterial: 86 %    Will increase PEEP to 7.5 and repeat abg in 30 minutes.     GI: PPI ppx. Hold Oral feeds in light of recent aspiration    Renal: Monitor UOP. Monitor lytes.  Start bicarb gtt    ID: start Unasyn in light of recent aspiration    Heme-Onc:  DVT ppx. Monitor CBC.  F/u endocrinetumor markers    Endo:  check fingersticks.      Prognosis Poor  Spoke with step daughter, Fariha, at length.  Full code at this time. PGY III is aware.

## 2020-02-17 NOTE — CONSULT NOTE ADULT - PROBLEM SELECTOR RECOMMENDATION 3
PA pressures 53/17/30   Radha cardiac output/index  4.7/2.3  Take PCWP at 15 mmHg, PVR would be 3.1  Start Marva at 10 ppm, increase to 20 ppm   Diuresis and inotropic support as above   Correct acidosis

## 2020-02-18 NOTE — PROGRESS NOTE ADULT - SUBJECTIVE AND OBJECTIVE BOX
Hospital Day:  11    Subjective:    Patient is a 64y old  Female who presents with a chief complaint of Leg swelling, cellulitis (2020 11:54)    Pt w/ 300cc coffee ground contents returned from OG tube overnight; KUB w/ non-obstructive gas pattern, surgery consulted not an operative candidate at this time; pt w/o bowel movement since .     Past Medical Hx:   H/O tricuspid valve disorder  Neuroendocrine cancer  Cancer    Past Sx:  S/P cholecystectomy  No significant past surgical history    Allergies:  Gluten (Unknown)  lactose (Unknown)  No Known Drug Allergies    Current Meds:   Standng Meds:  ampicillin/sulbactam  IVPB      ampicillin/sulbactam  IVPB 3 Gram(s) IV Intermittent every 6 hours  buMETAnide Infusion 2 mG/Hr (10 mL/Hr) IV Continuous <Continuous>  chlorhexidine 0.12% Liquid 15 milliLiter(s) Oral Mucosa every 12 hours  chlorhexidine 4% Liquid 1 Application(s) Topical <User Schedule>  DOBUTamine Infusion 2.5 MICROgram(s)/kG/Min (2.966 mL/Hr) IV Continuous <Continuous>  fentaNYL   Infusion. 0.5 MICROgram(s)/kG/Hr (3.955 mL/Hr) IV Continuous <Continuous>  multivitamin/minerals 1 Tablet(s) Oral daily  norepinephrine Infusion 0.05 MICROgram(s)/kG/Min (3.708 mL/Hr) IV Continuous <Continuous>  pantoprazole  Injectable 40 milliGRAM(s) IV Push two times a day  silver sulfADIAZINE 1% Cream 1 Application(s) Topical two times a day  sodium bicarbonate 650 milliGRAM(s) Oral every 8 hours    PRN Meds:  oxycodone    5 mG/acetaminophen 325 mG 1 Tablet(s) Oral every 6 hours PRN Severe Pain (7 - 10)    HOME MEDICATIONS:      Vital Signs:   T(F): 98.6 (20 @ 11:00), Max: 98.8 (20 @ 22:00)  HR: 114 (20 @ 13:00) (104 - 114)  BP: --  RR: 20 (20 @ 13:00) (19 - 20)  SpO2: 99% (20 @ 07:30) (98% - 100%)      20 @ 07:01  -  20 @ 07:00  --------------------------------------------------------  IN: 7536.5 mL / OUT: 350 mL / NET: 7186.5 mL    20 @ 07:01  -  20 @ 14:32  --------------------------------------------------------  IN: 1326.2 mL / OUT: 0 mL / NET: 1326.2 mL        Physical Exam:   GENERAL: NAD; intubated  HEENT: NCAT; LT triple lumen, RT swan catheter  CHEST/LUNG: CTAB  HEART: Irregular rhythm, systolic murmur  ABDOMEN: Tense abdomen, non-tender  EXTREMITIES: 2+ pitting edema in all extremities; RT femoral A-line  NERVOUS SYSTEM:  Sedated, follows commands, moves all extremities        Labs:                         9.9    12.23 )-----------( 43       ( 2020 10:25 )             28.2     Neutophil% 82.6, Lymphocyte% 3.5, Monocyte% 13.0, Bands% -- 20 @ 10:25    2020 04:00    136    |  97     |  65     ----------------------------<  142    4.9     |  19     |  2.3      Ca    7.2        2020 04:00  Phos  6.9       2020 04:00  Mg     2.2       2020 04:00    TPro  4.4    /  Alb  2.0    /  TBili  0.8    /  DBili  x      /  AST  2238   /  ALT  628    /  AlkPhos  274    2020 04:00       pTT    36.9             ----< 1.64 INR  (20 @ 05:50)    18.90        PT        Serum Pro-Brain Natriuretic Peptide: 3896 pg/mL (02-15-20 @ 21:13)    Troponin <0.01, CKMB 2.2,  02-15-20 @ 21:13        Urinalysis Basic - ( 2020 09:51 )    Color: Yellow / Appearance: Clear / S.028 / pH: x  Gluc: x / Ketone: Negative  / Bili: Small / Urobili: <2 mg/dL   Blood: x / Protein: 100 mg/dL / Nitrite: Negative   Leuk Esterase: Negative / RBC: 10 /HPF / WBC 17 /HPF   Sq Epi: x / Non Sq Epi: 2 /HPF / Bacteria: Negative            Radiology:       Assessment and Plan:     DVT ppx:    GI ppx:     Code Status:     DISPO: Hospital Day:  11    Subjective:    Patient is a 64y old  Female who presents with a chief complaint of Leg swelling, cellulitis (2020 11:54)    Pt w/ 300cc coffee ground contents returned from OG tube overnight; KUB w/ non-obstructive gas pattern, surgery consulted not an operative candidate at this time; pt w/o bowel movement since .     Past Medical Hx:   H/O tricuspid valve disorder  Neuroendocrine cancer  Cancer    Past Sx:  S/P cholecystectomy  No significant past surgical history    Allergies:  Gluten (Unknown)  lactose (Unknown)  No Known Drug Allergies    Current Meds:   Standng Meds:  ampicillin/sulbactam  IVPB      ampicillin/sulbactam  IVPB 3 Gram(s) IV Intermittent every 6 hours  buMETAnide Infusion 2 mG/Hr (10 mL/Hr) IV Continuous <Continuous>  chlorhexidine 0.12% Liquid 15 milliLiter(s) Oral Mucosa every 12 hours  chlorhexidine 4% Liquid 1 Application(s) Topical <User Schedule>  DOBUTamine Infusion 2.5 MICROgram(s)/kG/Min (2.966 mL/Hr) IV Continuous <Continuous>  fentaNYL   Infusion. 0.5 MICROgram(s)/kG/Hr (3.955 mL/Hr) IV Continuous <Continuous>  multivitamin/minerals 1 Tablet(s) Oral daily  norepinephrine Infusion 0.05 MICROgram(s)/kG/Min (3.708 mL/Hr) IV Continuous <Continuous>  pantoprazole  Injectable 40 milliGRAM(s) IV Push two times a day  silver sulfADIAZINE 1% Cream 1 Application(s) Topical two times a day  sodium bicarbonate 650 milliGRAM(s) Oral every 8 hours    PRN Meds:  oxycodone    5 mG/acetaminophen 325 mG 1 Tablet(s) Oral every 6 hours PRN Severe Pain (7 - 10)    HOME MEDICATIONS:      Vital Signs:   T(F): 98.6 (20 @ 11:00), Max: 98.8 (20 @ 22:00)  HR: 114 (20 @ 13:00) (104 - 114)  BP: --  RR: 20 (20 @ 13:00) (19 - 20)  SpO2: 99% (20 @ 07:30) (98% - 100%)      20 @ 07:01  -  20 @ 07:00  --------------------------------------------------------  IN: 7536.5 mL / OUT: 350 mL / NET: 7186.5 mL    20 @ 07:01  -  20 @ 14:32  --------------------------------------------------------  IN: 1326.2 mL / OUT: 0 mL / NET: 1326.2 mL        Physical Exam:   GENERAL: NAD; intubated  HEENT: NCAT; LT triple lumen, RT swan catheter  CHEST/LUNG: CTAB  HEART: Irregular rhythm, systolic murmur  ABDOMEN: Tense abdomen, non-tender  EXTREMITIES: 2+ pitting edema in all extremities; RT femoral A-line  NERVOUS SYSTEM:  Sedated, follows commands, moves all extremities        Labs:                         9.9    12.23 )-----------( 43       ( 2020 10:25 )             28.2     Neutophil% 82.6, Lymphocyte% 3.5, Monocyte% 13.0, Bands% -- 20 @ 10:25    2020 04:00    136    |  97     |  65     ----------------------------<  142    4.9     |  19     |  2.3      Ca    7.2        2020 04:00  Phos  6.9       2020 04:00  Mg     2.2       2020 04:00    TPro  4.4    /  Alb  2.0    /  TBili  0.8    /  DBili  x      /  AST  2238   /  ALT  628    /  AlkPhos  274    2020 04:00       pTT    36.9             ----< 1.64 INR  (20 @ 05:50)    18.90        PT        Serum Pro-Brain Natriuretic Peptide: 3896 pg/mL (02-15-20 @ 21:13)    Troponin <0.01, CKMB 2.2,  02-15-20 @ 21:13        Urinalysis Basic - ( 2020 09:51 )    Color: Yellow / Appearance: Clear / S.028 / pH: x  Gluc: x / Ketone: Negative  / Bili: Small / Urobili: <2 mg/dL   Blood: x / Protein: 100 mg/dL / Nitrite: Negative   Leuk Esterase: Negative / RBC: 10 /HPF / WBC 17 /HPF   Sq Epi: x / Non Sq Epi: 2 /HPF / Bacteria: Negative            Radiology:       Assessment and Plan:   # Carcinoid heart disease w/ severe TR; RT heart failure; pulmonary hypertension  - Pt has history of carcinoid heart disease - records obtained from cardiologist and oncologist  - TTE shows LVEF 65%, RV systolic dysfunction, enlarged RV, severe TR and mild pulmonary HTN. In context of carcinoid, TR and RV enlargement generally indicate shunt.   - Bedside bubble study confirms RT->LT shunt.   - CXR shows b/l pleural effusions  - On nitrous oxide, currently 15ppm.   - Bumex gtt.   - Currently requiring norepinephrine, dobutamine. Vasopressin weaned off.   - Heart failure Dr Solomon on case.   - monitor and replete electrolytes (potassium and magnesium)      #Acute hypoxic respiratory failure  - Pt on BPAP when upgraded to CCU; episode of emesis into mask and likely aspiration.   - Intubated, now on 60% O2 ABGs improving.   - Unasyn 3g Q6hrs.     # Episodic hypotension and diarrhea possibly secondary to carcinoid syndrome  - AM cortisol negative   - c/w imodium, c diff negative   - octreotide     # ELOINA, possibly ATN due to hypotension  - discontinued bicarb gtt due to anasarca and heart failure  - Sodium bicarb 650mg q8hrs via OG tube    - monitor BMP     # LE cellulitis/ open wounds   - d/c doxy  - nasal swab for MRSA is negative  - wound cx  contaminated   - c/w local wound care as per burns, ace wraps, silver sulfadiazene.   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      # DVT ppx: hold for now  # Diet: NPO  # Dispo: acute  # DNR - pt has living will which states she wanted no aggressive measures: DNR, DNI, no feeding tube.  appointed as proxy, but  has dementia. Stepdaughter Fariha [phone] has been making decision w/ pt's other stepdaughter. Hospital Day:  11    Subjective:    Patient is a 64y old  Female who presents with a chief complaint of Leg swelling, cellulitis (2020 11:54)    Pt w/ 300cc coffee ground contents returned from OG tube overnight; KUB w/ non-obstructive gas pattern, surgery consulted not an operative candidate at this time; pt w/o bowel movement since .     Past Medical Hx:   H/O tricuspid valve disorder  Neuroendocrine cancer  Cancer    Past Sx:  S/P cholecystectomy  No significant past surgical history    Allergies:  Gluten (Unknown)  lactose (Unknown)  No Known Drug Allergies    Current Meds:   Standng Meds:  ampicillin/sulbactam  IVPB      ampicillin/sulbactam  IVPB 3 Gram(s) IV Intermittent every 6 hours  buMETAnide Infusion 2 mG/Hr (10 mL/Hr) IV Continuous <Continuous>  chlorhexidine 0.12% Liquid 15 milliLiter(s) Oral Mucosa every 12 hours  chlorhexidine 4% Liquid 1 Application(s) Topical <User Schedule>  DOBUTamine Infusion 2.5 MICROgram(s)/kG/Min (2.966 mL/Hr) IV Continuous <Continuous>  fentaNYL   Infusion. 0.5 MICROgram(s)/kG/Hr (3.955 mL/Hr) IV Continuous <Continuous>  multivitamin/minerals 1 Tablet(s) Oral daily  norepinephrine Infusion 0.05 MICROgram(s)/kG/Min (3.708 mL/Hr) IV Continuous <Continuous>  pantoprazole  Injectable 40 milliGRAM(s) IV Push two times a day  silver sulfADIAZINE 1% Cream 1 Application(s) Topical two times a day  sodium bicarbonate 650 milliGRAM(s) Oral every 8 hours    PRN Meds:  oxycodone    5 mG/acetaminophen 325 mG 1 Tablet(s) Oral every 6 hours PRN Severe Pain (7 - 10)    HOME MEDICATIONS:      Vital Signs:   T(F): 98.6 (20 @ 11:00), Max: 98.8 (20 @ 22:00)  HR: 114 (20 @ 13:00) (104 - 114)  BP: --  RR: 20 (20 @ 13:00) (19 - 20)  SpO2: 99% (20 @ 07:30) (98% - 100%)      20 @ 07:01  -  20 @ 07:00  --------------------------------------------------------  IN: 7536.5 mL / OUT: 350 mL / NET: 7186.5 mL    20 @ 07:01  -  20 @ 14:32  --------------------------------------------------------  IN: 1326.2 mL / OUT: 0 mL / NET: 1326.2 mL        Physical Exam:   GENERAL: NAD; intubated  HEENT: NCAT; LT triple lumen, RT swan catheter  CHEST/LUNG: CTAB  HEART: Irregular rhythm, systolic murmur  ABDOMEN: Tense abdomen, non-tender  EXTREMITIES: 2+ pitting edema in all extremities; RT femoral A-line  NERVOUS SYSTEM:  Sedated, follows commands, moves all extremities        Labs:                         9.9    12.23 )-----------( 43       ( 2020 10:25 )             28.2     Neutophil% 82.6, Lymphocyte% 3.5, Monocyte% 13.0, Bands% -- 20 @ 10:25    2020 04:00    136    |  97     |  65     ----------------------------<  142    4.9     |  19     |  2.3      Ca    7.2        2020 04:00  Phos  6.9       2020 04:00  Mg     2.2       2020 04:00    TPro  4.4    /  Alb  2.0    /  TBili  0.8    /  DBili  x      /  AST  2238   /  ALT  628    /  AlkPhos  274    2020 04:00       pTT    36.9             ----< 1.64 INR  (20 @ 05:50)    18.90        PT        Serum Pro-Brain Natriuretic Peptide: 3896 pg/mL (02-15-20 @ 21:13)    Troponin <0.01, CKMB 2.2,  02-15-20 @ 21:13        Urinalysis Basic - ( 2020 09:51 )    Color: Yellow / Appearance: Clear / S.028 / pH: x  Gluc: x / Ketone: Negative  / Bili: Small / Urobili: <2 mg/dL   Blood: x / Protein: 100 mg/dL / Nitrite: Negative   Leuk Esterase: Negative / RBC: 10 /HPF / WBC 17 /HPF   Sq Epi: x / Non Sq Epi: 2 /HPF / Bacteria: Negative            Radiology:       Assessment and Plan:   # Carcinoid heart disease w/ severe TR; RT heart failure; pulmonary hypertension  - Pt has history of carcinoid heart disease - records obtained from cardiologist and oncologist  - TTE shows LVEF 65%, RV systolic dysfunction, enlarged RV, severe TR and mild pulmonary HTN. In context of carcinoid, TR and RV enlargement generally indicate shunt.   - Bedside bubble study confirms RT->LT shunt.   - CXR shows b/l pleural effusions  - On nitrous oxide, currently 15ppm.   - Bumex gtt.   - Currently requiring norepinephrine, dobutamine. Vasopressin weaned off.   - Heart failure Dr Solomon on case.   - monitor and replete electrolytes (potassium and magnesium)      #Acute hypoxic respiratory failure  - Pt on BPAP when upgraded to CCU; episode of emesis into mask and likely aspiration.   - Intubated, now on 60% O2 ABGs improving.   - Unasyn 3g Q6hrs.     #Coffee ground output from OG tube  - 300cc this AM, nothing further.   - Tense abdomen, but no tenderness elicited.   - Surgery consulted, no acute intervention at this time.   - f/u RUQ US r/o ascites.     #Thrombocytopenia  - HIT panel sent.   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   - f/u kidney/bladder US  - appreciate renal recs    # LE cellulitis/ open wounds   - d/c doxy  - nasal swab for MRSA is negative  - wound cx / contaminated   - c/w local wound care as per burns, ace wraps, silver sulfadiazene.   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      # DVT ppx: hold for now  # Diet: NPO  # Dispo: acute  # DNR - pt has living will which states she wanted no aggressive measures: DNR, DNI, no feeding tube.  appointed as proxy, but  has dementia. Stepdaughter Fariha [phone] has been making decision w/ pt's other stepdaughter.

## 2020-02-18 NOTE — PROGRESS NOTE ADULT - ASSESSMENT
IMPRESSION:    Acute hypoxic resp failure/ Severe Pul HTN/ cor pulmonale on levophed/ off vasopressin/ cor pulmonale  Metastatic NE tumor, s/p liver resection  multiorgan failure no UO      PLAN:    CNS: keep Fentanyl    HEENT:  Oral care    PULMONARY:  HOB @ 45 degrees, echo bubble SHUNT present, on NO, DEC PEEP and FIO2    CARDIOVASCULAR: dr SANDOVAL, R HEART CATH, ECHO reviewed, dobutamine, taper pressors    GI: GI prophylaxis                                          Feeding protonix   q 12h, ruq sono, bladder pressure, feeding    RENAL:  F/u  lytes.  Correct as needed. accurate I/Om, reanl eval    INFECTIOUS DISEASE: abx    HEMATOLOGICAL:  DVT prophylaxis. ELIA plummer, repeat CBC    ENDOCRINE:  Follow up FS.  Insulin protocol if needed.      DISPOSITION: Pt requires continued monitoring in the MICU    PALLIATIVE CARE EVAL  very poor prognosis

## 2020-02-18 NOTE — CONSULT NOTE ADULT - SUBJECTIVE AND OBJECTIVE BOX
REQUESTED OF: DR Alcantar     Chart reviewed, Hospital Day 11     JANICE COURTNEY 64yFemale  HPI:  Pt is a 65 yo F with PMH of metastatic neuroendocrine tumor s/p resection in liver, tricuspid regurg comes to the E with bilateral leg swelling. Pt was admitted to the [facility] in Dec 2019 for similar complaints when she was diagnosed with severe TR and discharged on po lasix. Pt states that she took the lasix for a few days after discharge but stopped taking it as she was getting lightheaded and dizzy and it didnt agree with her.   Pt states that she has had these leg blisters since discharge but they have gotten worse- to the point that skin started to weep. Pt tried local wound care at home with no improvement. Pt was seen by her PMD and sent to the ED for Iv abx and burn eval.   Denies any fever, chills, headache, SOB, cough.  Pt  has h/o liver neuroendocrine tumor that was resected in 2013 at [facility] followed by embolization procedures. Pt states that she followed up with Dr oscar at [facility] but hasnot been able to see her in a while.   VS on arrival- noted to be stable. labs with chronic macrocytic anemia, BUN/Cr > 20.   VA duplex arterial and venous done in ED- pending read, received cefazolin in ED. (07 Feb 2020 04:00)        PAST MEDICAL & SURGICAL HISTORY:  H/O tricuspid valve disorder  Neuroendocrine cancer  S/P cholecystectomy      Subjective and Objective:  Today,  Discussed with Dr Boyce - CCU resident     Focused Palliative Care Evaluation:                   Symptoms:                                      Pain none                                      Dyspnea none                                      N/V none                                      Appetite n/a                                      Anxiety none                                      Other _____________________                     Support Devices: orally intubated with ventilator              PHYSICAL EXAM:      Constitutional: pt appears as stated age and critically ill     Respiratory: on vent no distress     Cardiovascular: (-) JVD     Gastrointestinal: large soft (+) OG tube     Genitourinary: sykes catheter to straight drainage - no output     Extremities: anasarca     Neurological: pt sedated on fentanyl, is arousable     Skin: fragile, weeping         T(C): 37, Max: 37.1 (22:00)  HR: 114 (104 - 114)  BP: --  RR: 20 (20 - 20)  SpO2: 99% (98% - 100%)      LABS/STUDIES:  02-18    136  |  97<L>  |  65<HH>  ----------------------------<  142<H>  4.9   |  19  |  2.3<H>    Ca    7.2<L>      18 Feb 2020 04:00  Phos  6.9     02-18  Mg     2.2     02-18    TPro  4.4<L>  /  Alb  2.0<L>  /  TBili  0.8  /  DBili  x   /  AST  2238<H>  /  ALT  628<H>  /  AlkPhos  274<H>  02-18                            9.9    12.23 )-----------( 43       ( 18 Feb 2020 10:25 )             28.2       MEDICATIONS  (STANDING):  ampicillin/sulbactam  IVPB      ampicillin/sulbactam  IVPB 3 Gram(s) IV Intermittent every 6 hours  buMETAnide Infusion 2 mG/Hr (10 mL/Hr) IV Continuous <Continuous>  chlorhexidine 0.12% Liquid 15 milliLiter(s) Oral Mucosa every 12 hours  chlorhexidine 4% Liquid 1 Application(s) Topical <User Schedule>  DOBUTamine Infusion 2.5 MICROgram(s)/kG/Min (2.966 mL/Hr) IV Continuous <Continuous>  fentaNYL   Infusion. 0.5 MICROgram(s)/kG/Hr (3.955 mL/Hr) IV Continuous <Continuous>  multivitamin/minerals 1 Tablet(s) Oral daily  norepinephrine Infusion 0.05 MICROgram(s)/kG/Min (3.708 mL/Hr) IV Continuous <Continuous>  pantoprazole  Injectable 40 milliGRAM(s) IV Push two times a day  silver sulfADIAZINE 1% Cream 1 Application(s) Topical two times a day  sodium bicarbonate 650 milliGRAM(s) Oral every 8 hours    MEDICATIONS  (PRN):  oxycodone    5 mG/acetaminophen 325 mG 1 Tablet(s) Oral every 6 hours PRN Severe Pain (7 - 10)          iStop:   #: [id number]    There are no results for the search terms that you entered.      PPS  Level 10%       Note PPS = Palliative Performance Scale; (c)2001, [facility] Society       Range from 100% meaning Full ambulation/self-care/intake/Level of Consicous                                                                              to        10% meaning Bedbound/Unable to do any activity/extensive disease /Total Care/ No PO intake/ LOC=Full/drowsy/+/-confusion        (0% = death)                     Prior to acute illness, patient's functionality reportedly, no one to ask at bedside, pt was alert and oriented x 4 and lived alone prior to this hospital stay, however had history of heart failure and neuroendocrine cancer.

## 2020-02-18 NOTE — PROGRESS NOTE ADULT - SUBJECTIVE AND OBJECTIVE BOX
OVERNIGHT EVENTS: still intubated, ventilated,  on levophed 1.3,  2.5 bume drip bicarb 1 l d5 3 amp protonix, 300 cc NGT, fentanyl    Vital Signs Last 24 Hrs  T(C): 36.9 (2020 07:15), Max: 37.2 (2020 14:25)  T(F): 98.4 (2020 07:15), Max: 99 (2020 14:25)  HR: 112 (2020 07:30) (104 - 114)  RR: 20 (2020 07:15) (16 - 20)  SpO2: 99% (2020 07:30) (53% - 100%)    PHYSICAL EXAMINATION:    GENERAL: ILL LOOKING, awake following coammands    HEENT: Head is normocephalic and atraumatic. Extraocular muscles are intact. Mucous membranes are moist.    NECK: Supple.    LUNGS: dec bs both bases    HEART: S1S2 R GARRETT 4/6    ABDOMEN: Soft, distended    EXTREMITIES: swelling ++    NEUROLOGIC: Grossly intact.    SKIN: No ulceration or induration present.      LABS:                        10.3   12.40 )-----------( 55       ( 2020 04:00 )             29.8     02-18    136  |  97<L>  |  65<HH>  ----------------------------<  142<H>  4.9   |  19  |  2.3<H>    Ca    7.2<L>      2020 04:00  Phos  6.9     -18  Mg     2.2     -18    TPro  4.4<L>  /  Alb  2.0<L>  /  TBili  0.8  /  DBili  x   /  AST  2238<H>  /  ALT  628<H>  /  AlkPhos  274<H>  02-18    PT/INR - ( 2020 05:50 )   PT: 18.90 sec;   INR: 1.64 ratio         PTT - ( 2020 05:50 )  PTT:36.9 sec  Urinalysis Basic - ( 2020 09:51 )    Color: Yellow / Appearance: Clear / S.028 / pH: x  Gluc: x / Ketone: Negative  / Bili: Small / Urobili: <2 mg/dL   Blood: x / Protein: 100 mg/dL / Nitrite: Negative   Leuk Esterase: Negative / RBC: 10 /HPF / WBC 17 /HPF   Sq Epi: x / Non Sq Epi: 2 /HPF / Bacteria: Negative      ABG - ( 2020 03:18 )  pH, Arterial: 7.33  pH, Blood: x     /  pCO2: 35    /  pO2: 263   / HCO3: 18    / Base Excess: -6.8  /  SaO2: 100       450/20/100/10            D-Dimer Assay, Quantitative: 7473 ng/mL DDU (20 @ 05:50)    Serum Pro-Brain Natriuretic Peptide: 3896 pg/mL (02-15-20 @ 21:13)            20 @ 07:01  -  20 @ 07:00  --------------------------------------------------------  IN: 7536.5 mL / OUT: 350 mL / NET: 7186.5 mL        MICROBIOLOGY:      MEDICATIONS  (STANDING):  ampicillin/sulbactam  IVPB      ampicillin/sulbactam  IVPB 3 Gram(s) IV Intermittent every 6 hours  buMETAnide Infusion 2 mG/Hr (10 mL/Hr) IV Continuous <Continuous>  chlorhexidine 0.12% Liquid 15 milliLiter(s) Oral Mucosa every 12 hours  chlorhexidine 4% Liquid 1 Application(s) Topical <User Schedule>  dextrose 5% 1000 milliLiter(s) (100 mL/Hr) IV Continuous <Continuous>  DOBUTamine Infusion 2.5 MICROgram(s)/kG/Min (2.966 mL/Hr) IV Continuous <Continuous>  fentaNYL   Infusion. 0.5 MICROgram(s)/kG/Hr (3.955 mL/Hr) IV Continuous <Continuous>  midodrine. 10 milliGRAM(s) Oral three times a day  multivitamin/minerals 1 Tablet(s) Oral daily  norepinephrine Infusion 0.05 MICROgram(s)/kG/Min (3.708 mL/Hr) IV Continuous <Continuous>  pantoprazole Infusion 8 mG/Hr (10 mL/Hr) IV Continuous <Continuous>  silver sulfADIAZINE 1% Cream 1 Application(s) Topical two times a day  sodium bicarbonate 325 milliGRAM(s) Oral three times a day    MEDICATIONS  (PRN):  oxycodone    5 mG/acetaminophen 325 mG 1 Tablet(s) Oral every 6 hours PRN Severe Pain (7 - 10)      RADIOLOGY & ADDITIONAL STUDIES:

## 2020-02-18 NOTE — CONSULT NOTE ADULT - ASSESSMENT
Consult Summary: Pt is a 64 yr old female with advanced heart failure from TVR and RV enlargement causing severe pulmonary HTN. Came in for swollen legs had not been taking lasix. Pulmonary status declined while in hospital and hypotension with declining mental status causing pt to be intubated on 2/17/20. Pt seen by critical care attending and heartfailure specialist who say that she has multiorgan failure and a grave prognosis. On pressor support, IV diuresis and now requires CVVH.    Pt has two step daughters who are her next of kin. Her spouse has dementia and lacks capacity. Step daughters found a living will over last few days and made her a DNR as per her living will. They also refused CVVH. Pt comfortable, appears to be critical. Seen by palliative team who spoke to CCU team. Palliative team will explain options of comfort care.       Morphine Equivalent Daily Dose (MEDD): Fentanyl gtt    Recommendations:  DNR  palliative care available to meet with patient's step daughters (surrogates) 2/19/20 at 11am      Please Call [phone] PRN

## 2020-02-18 NOTE — CONSULT NOTE ADULT - ASSESSMENT
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 now intubated on MV with ELOINA     ·	ELOINA rule out ATN ( hypotension on pressors)/ Cardiorenal syndrome type 2 / right sided heart failure / Severe TR / on 2 pressors   ·	sp BUMEx drip without improvement/ can DC drip   ·	would be a candidate for CVVHD if family agreeable/ meeting with palliative care team later on   ·	check renal bladder sono  ·	check urine lytes and U creat   ·	follow BMP   ·	doubt AIN but check U eos  ·	IP noted high start phoslo one tab q8h   ·	prognosis guarded will check with family regarding further steps     d/w CCU team

## 2020-02-18 NOTE — CONSULT NOTE ADULT - PROVIDER SPECIALTY LIST ADULT
Cardiology
Nephrology
Endocrinology
Heme/Onc
Infectious Disease
Palliative Care
Surgery
Critical Care
Burn
Heart Failure

## 2020-02-18 NOTE — PROGRESS NOTE ADULT - SUBJECTIVE AND OBJECTIVE BOX
Patient is a 64y old  Female who presents with a chief complaint of Leg swelling, cellulitis (2020 08:06)      Subjective: She in intubated and sedated and is on pressor support       Vital Signs Last 24 Hrs  T(C): 36.9 (2020 07:15), Max: 37.2 (2020 14:25)  T(F): 98.4 (2020 07:15), Max: 99 (2020 14:25)  HR: 110 (2020 09:00) (104 - 114)  BP: --  BP(mean): --  RR: 20 (2020 09:00) (16 - 20)  SpO2: 99% (2020 07:30) (53% - 100%)    PHYSICAL EXAM  General: intubated and sedated   CV: normal S1/S2   Lungs: positive air movement b/l ant lungs. Vent support  Abdomen: soft non-tender   Neuro: Intubated and sedated. Opens eyes when name is called out     MEDICATIONS  (STANDING):  ampicillin/sulbactam  IVPB      ampicillin/sulbactam  IVPB 3 Gram(s) IV Intermittent every 6 hours  buMETAnide Infusion 2 mG/Hr (10 mL/Hr) IV Continuous <Continuous>  chlorhexidine 0.12% Liquid 15 milliLiter(s) Oral Mucosa every 12 hours  chlorhexidine 4% Liquid 1 Application(s) Topical <User Schedule>  dextrose 5% 1000 milliLiter(s) (100 mL/Hr) IV Continuous <Continuous>  DOBUTamine Infusion 2.5 MICROgram(s)/kG/Min (2.966 mL/Hr) IV Continuous <Continuous>  fentaNYL   Infusion. 0.5 MICROgram(s)/kG/Hr (3.955 mL/Hr) IV Continuous <Continuous>  midodrine. 10 milliGRAM(s) Oral three times a day  multivitamin/minerals 1 Tablet(s) Oral daily  norepinephrine Infusion 0.05 MICROgram(s)/kG/Min (3.708 mL/Hr) IV Continuous <Continuous>  pantoprazole  Injectable 40 milliGRAM(s) IV Push two times a day  silver sulfADIAZINE 1% Cream 1 Application(s) Topical two times a day  sodium bicarbonate 325 milliGRAM(s) Oral three times a day    MEDICATIONS  (PRN):  oxycodone    5 mG/acetaminophen 325 mG 1 Tablet(s) Oral every 6 hours PRN Severe Pain (7 - 10)      LABS:                          10.3   12.40 )-----------( 55       ( 2020 04:00 )             29.8         Mean Cell Volume : 108.4 fL  Mean Cell Hemoglobin : 37.5 pg  Mean Cell Hemoglobin Concentration : 34.6 g/dL  Auto Neutrophil # : 10.94 K/uL  Auto Lymphocyte # : 0.39 K/uL  Auto Monocyte # : 0.81 K/uL  Auto Eosinophil # : 0.00 K/uL  Auto Basophil # : 0.05 K/uL  Auto Neutrophil % : 88.3 %  Auto Lymphocyte % : 3.1 %  Auto Monocyte % : 6.5 %  Auto Eosinophil % : 0.0 %  Auto Basophil % : 0.4 %      Serial CBC's   @ 04:00  Hct-29.8 / Hgb-10.3 / Plat-55 / RBC-2.75 / WBC-12.40  Serial CBC's   @ 04:00  Hct-32.3 / Hgb-11.2 / Plat-118 / RBC-3.08 / WBC-5.94  Serial CBC's   @ 08:52  Hct-30.3 / Hgb-10.9 / Plat-117 / RBC-2.88 / WBC-5.27  Serial CBC's  02-15 @ 21:13  Hct-31.9 / Hgb-10.9 / Plat-138 / RBC-3.02 / WBC-3.98  Serial CBC's  02-15 @ 07:46  Hct-29.1 / Hgb-10.3 / Plat-157 / RBC-2.75 / WBC-2.98      18    136  |  97<L>  |  65<HH>  ----------------------------<  142<H>  4.9   |  19  |  2.3<H>    Ca    7.2<L>      2020 04:00  Phos  6.9     18  Mg     2.2         TPro  4.4<L>  /  Alb  2.0<L>  /  TBili  0.8  /  DBili  x   /  AST  2238<H>  /  ALT  628<H>  /  AlkPhos  274<H>        PT/INR - ( 2020 05:50 )   PT: 18.90 sec;   INR: 1.64 ratio         PTT - ( 2020 05:50 )  PTT:36.9 sec                Urinalysis Basic - ( 2020 09:51 )    Color: Yellow / Appearance: Clear / S.028 / pH: x  Gluc: x / Ketone: Negative  / Bili: Small / Urobili: <2 mg/dL   Blood: x / Protein: 100 mg/dL / Nitrite: Negative   Leuk Esterase: Negative / RBC: 10 /HPF / WBC 17 /HPF   Sq Epi: x / Non Sq Epi: 2 /HPF / Bacteria: Negative                BLOOD SMEAR INTERPRETATION:       RADIOLOGY & ADDITIONAL STUDIES:

## 2020-02-18 NOTE — CONSULT NOTE ADULT - SUBJECTIVE AND OBJECTIVE BOX
General Surgery Consultation Note  =====================================================  HPI: 64y Female on PMH metastatic small bowel neuroendocrine tumor s/p liver resection 2013 [facility] and possible colon resection, embolization therapy, and chemo with recent diagnosed severe tricupsid regurg discharged Dec 2019 with Lasix.  She was send by her PMD for Abx and burn evaluation for LE blistering, and was recently upgraded for AMS. She developed AMS and hypotension, and was started in BiPAP into which she vomited and likely aspirated. She was then intubated and upgraded to CCU, where she has been in acute right heart failure with severe pulmonary hypertension on Bumex drip, NO, and Salina Aung. In addition, she has shock liver and is currently on 1.6 of levo (weaned off of jah and off vaso). She is oliguric to anuric on Bumex gtt, with rising BUN and Cr 1.5-> 2.3, as well as respiratory failure. Her lactate has risen from 2s on 2/15 to 6-7s on 2/17 through today.  Surgery was consulted for evaluation as she had an OGT that was off of suction for 12 hours and when placed to suction had 300cc of dark brown liquid output, concerning for GI bleed vs other abdominal pathology. Of note, she is additionally DNR, and notes from Dr. Hawley from [facility] found from October to December 2019, she had increasing hepatic mets, pleural mets, abdominal lymphadenopathy, osseous mets, and colon and small bowel edema with pleural effusions.         PAST MEDICAL & SURGICAL HISTORY:  H/O tricuspid valve disorder  Neuroendocrine cancer  S/P cholecystectomy  s/p liver resection, chemo, and possible colon resection  IBS       Home Meds: UTO     Allergies:   Gluten (Unknown)  lactose (Unknown)  No Known Drug Allergies    Intolerances      Soc: information based on chart. Patient unable to talk or indicate, and family not answering  Advanced Directives: Presumed Full Code     ROS:    REVIEW OF SYSTEMS    [ ] A ten-point review of systems was otherwise negative except as noted.  [x] Due to altered mental status/intubation, subjective information were not able to be obtained from the patient. History was obtained, to the extent possible, from review of the chart and collateral sources of information.      CURRENT MEDICATIONS:   --------------------------------------------------------------------------------------  Neurologic Medications  fentaNYL   Infusion. 0.5 MICROgram(s)/kG/Hr IV Continuous <Continuous>  oxycodone    5 mG/acetaminophen 325 mG 1 Tablet(s) Oral every 6 hours PRN Severe Pain (7 - 10)    Respiratory Medications    Cardiovascular Medications  buMETAnide Infusion 2 mG/Hr IV Continuous <Continuous>  DOBUTamine Infusion 2.5 MICROgram(s)/kG/Min IV Continuous <Continuous>  midodrine. 10 milliGRAM(s) Oral three times a day  norepinephrine Infusion 0.05 MICROgram(s)/kG/Min IV Continuous <Continuous>    Gastrointestinal Medications  dextrose 5% 1000 milliLiter(s) IV Continuous <Continuous>  multivitamin/minerals 1 Tablet(s) Oral daily  pantoprazole  Injectable 40 milliGRAM(s) IV Push two times a day  sodium bicarbonate 325 milliGRAM(s) Oral three times a day    Genitourinary Medications    Hematologic/Oncologic Medications    Antimicrobial/Immunologic Medications  ampicillin/sulbactam  IVPB      ampicillin/sulbactam  IVPB 3 Gram(s) IV Intermittent every 6 hours    Endocrine/Metabolic Medications    Topical/Other Medications  chlorhexidine 0.12% Liquid 15 milliLiter(s) Oral Mucosa every 12 hours  chlorhexidine 4% Liquid 1 Application(s) Topical <User Schedule>  silver sulfADIAZINE 1% Cream 1 Application(s) Topical two times a day    --------------------------------------------------------------------------------------    VITAL SIGNS, INS/OUTS (last 24 hours):  --------------------------------------------------------------------------------------  ICU Vital Signs Last 24 Hrs  T(C): 36.9 (18 Feb 2020 07:15), Max: 37.2 (17 Feb 2020 14:25)  T(F): 98.4 (18 Feb 2020 07:15), Max: 99 (17 Feb 2020 14:25)  HR: 112 (18 Feb 2020 10:00) (104 - 114)  BP: --  BP(mean): --  ABP: 134/70 (18 Feb 2020 10:00) (76/62 - 142/72)  ABP(mean): 88 (18 Feb 2020 10:00) (62 - 90)  RR: 20 (18 Feb 2020 10:00) (16 - 20)  SpO2: 99% (18 Feb 2020 07:30) (53% - 100%)    I&O's Summary  17 Feb 2020 07:01  -  18 Feb 2020 07:00  --------------------------------------------------------  IN: 7536.5 mL / OUT: 350 mL / NET: 7186.5 mL    18 Feb 2020 07:01  -  18 Feb 2020 10:21  --------------------------------------------------------  IN: 901.8 mL / OUT: 0 mL / NET: 901.8 mL      --------------------------------------------------------------------------------------  PHYSICAL EXAM  General: NAD, eyes open to voice, does not follow commands. moving bl le spontaneously, per nursing has moved hand grasp to command, not on this exam  HEENT: NCAT, eyes tracking minimally- opens to voice   Cardiac: regular, tachycardic, + systolic murmur  Respiratory: intubated CTAB, normal respiratory effort, breath sounds equal BL, no wheeze, rhonchi or crackles  Abdomen: Soft, anasarca and pitting on abdomen, no skin changes.  no grimacing. Inconsistent minimal tenderness in LLQ.  +bowel sounds. + well healed scar. No guarding  Musculoskeletal: anasarca, pitting, tense  Vascular: pitting and edematous throughout,  extremities  warm   Skin: Warm/dry, normal color, no jaundice. + pallor      LABS  --------------------------------------------------------------------------------------  Labs:  CAPILLARY BLOOD GLUCOSE  POCT Blood Glucose.: 131 mg/dL (18 Feb 2020 07:20)  POCT Blood Glucose.: 110 mg/dL (18 Feb 2020 04:10)  POCT Blood Glucose.: 91 mg/dL (18 Feb 2020 00:00)  POCT Blood Glucose.: 115 mg/dL (17 Feb 2020 18:11)  POCT Blood Glucose.: 117 mg/dL (17 Feb 2020 13:55)  POCT Blood Glucose.: 48 mg/dL (17 Feb 2020 11:34)                          10.3   12.40 )-----------( 55       ( 18 Feb 2020 04:00 )             29.8       Auto Neutrophil %: 88.3 % (02-18-20 @ 04:00)  Auto Immature Granulocyte %: 1.7 % (02-18-20 @ 04:00)    02-18    136  |  97<L>  |  65<HH>  ----------------------------<  142<H>  4.9   |  19  |  2.3<H>      Calcium, Total Serum: 7.2 mg/dL (02-18-20 @ 04:00)      LFTs:             4.4  | 0.8  | 2238     ------------------[274     ( 18 Feb 2020 04:00 )  2.0  | x    | 628         Lipase:x      Amylase:x         Blood Gas Arterial, Lactate: 6.4 mmoL/L (02-18-20 @ 03:18)  Blood Gas Arterial, Lactate: 6.7 mmoL/L (02-17-20 @ 15:44)  Blood Gas Arterial, Lactate: 6.1 mmoL/L (02-17-20 @ 13:15)  Blood Gas Arterial, Lactate: 6.3 mmoL/L (02-17-20 @ 13:03)  Blood Gas Arterial, Lactate: 6.2 mmoL/L (02-17-20 @ 12:57)  Blood Gas Arterial, Lactate: 6.3 mmoL/L (02-17-20 @ 12:56)  Blood Gas Arterial, Lactate: 7.4 mmoL/L (02-17-20 @ 10:29)  Blood Gas Arterial, Lactate: 3.8 mmoL/L (02-17-20 @ 06:20)  Blood Gas Arterial, Lactate: 8.1 mmoL/L (02-17-20 @ 04:53)  Blood Gas Arterial, Lactate: 5.7 mmoL/L (02-17-20 @ 03:40)  Blood Gas Arterial, Lactate: 2.4 mmoL/L (02-15-20 @ 17:30)    ABG - ( 18 Feb 2020 03:18 )  pH: 7.33  /  pCO2: 35    /  pO2: 263   / HCO3: 18    / Base Excess: -6.8  /  SaO2: 100             ABG - ( 17 Feb 2020 15:44 )  pH: 7.20  /  pCO2: 42    /  pO2: 141   / HCO3: 16    / Base Excess: -11.2 /  SaO2: 98              ABG - ( 17 Feb 2020 13:15 )  pH: 7.12  /  pCO2: 56    /  pO2: 74    / HCO3: 18    / Base Excess: -10.8 /  SaO2: 88                Coags:     18.90  ----< 1.64    ( 18 Feb 2020 05:50 )     36.9            Serum Pro-Brain Natriuretic Peptide: 3896 pg/mL (02-15-20 @ 21:13)            --------------------------------------------------------------------------------------    OTHER LABS    IMAGING RESULTS  < from: Xray Chest 1 View- PORTABLE-Urgent (02.18.20 @ 09:35) >  Skeleton/soft tissues: Unchanged.    Impression:      Basilar opacifications and effusions. Support devices as described.    < end of copied text >      < from: Xray Kidney Ureter Bladder (02.18.20 @ 06:45) >  Impression:    Nonobstructive bowel gas pattern.    Enteric tube visualized in the region of the stomach.    < end of copied text >    ---------------------------------------------------------------------------------------

## 2020-02-18 NOTE — CONSULT NOTE ADULT - SUBJECTIVE AND OBJECTIVE BOX
NEPHROLOGY CONSULTATION NOTE    THIS CONSULT IS INCOMPLETE / FULL CONSULT TO FOLLOW    Patient is a 64y Female whom presented to the hospital with     PAST MEDICAL & SURGICAL HISTORY:  H/O tricuspid valve disorder  Neuroendocrine cancer  S/P cholecystectomy    Allergies:  Gluten (Unknown)  lactose (Unknown)  No Known Drug Allergies    Home Medications Reviewed  Hospital Medications:   MEDICATIONS  (STANDING):  ampicillin/sulbactam  IVPB      ampicillin/sulbactam  IVPB 3 Gram(s) IV Intermittent every 6 hours  buMETAnide Infusion 2 mG/Hr (10 mL/Hr) IV Continuous <Continuous>  DOBUTamine Infusion 2.5 MICROgram(s)/kG/Min (2.966 mL/Hr) IV Continuous <Continuous>  fentaNYL   Infusion. 0.5 MICROgram(s)/kG/Hr (3.955 mL/Hr) IV Continuous <Continuous>  multivitamin/minerals 1 Tablet(s) Oral daily  norepinephrine Infusion 0.05 MICROgram(s)/kG/Min (3.708 mL/Hr) IV Continuous <Continuous>  pantoprazole  Injectable 40 milliGRAM(s) IV Push two times a day  silver sulfADIAZINE 1% Cream 1 Application(s) Topical two times a day  sodium bicarbonate 650 milliGRAM(s) Oral every 8 hours      SOCIAL HISTORY:  Denies ETOH,Smoking,   FAMILY HISTORY:        REVIEW OF SYSTEMS:  CONSTITUTIONAL: No weakness, fevers or chills  EYES/ENT: No visual changes;  No vertigo or throat pain   NECK: No pain or stiffness  RESPIRATORY: No cough, wheezing, hemoptysis; No shortness of breath  CARDIOVASCULAR: No chest pain or palpitations.  GASTROINTESTINAL: No abdominal or epigastric pain. No nausea, vomiting, or hematemesis; No diarrhea or constipation. No melena or hematochezia.  GENITOURINARY: No dysuria, frequency, foamy urine, urinary urgency, incontinence or hematuria  NEUROLOGICAL: No numbness or weakness  SKIN: No itching, burning, rashes, or lesions   VASCULAR: No bilateral lower extremity edema.   All other review of systems is negative unless indicated above.    VITALS:  T(F): 98.6 (20 @ 11:00), Max: 99 (20 @ 14:25)  HR: 114 (20 @ 11:00)  BP: --  RR: 20 (20 @ 11:00)  SpO2: 99% (02-18-20 @ 07:30)     @ 07:  -   @ 07:00  --------------------------------------------------------  IN: 7536.5 mL / OUT: 350 mL / NET: 7186.5 mL     @ 07:01  -   @ 11:54  --------------------------------------------------------  IN: 901.8 mL / OUT: 0 mL / NET: 901.8 mL          20 @ 07:01  -  20 @ 07:00  --------------------------------------------------------  IN: 0 mL / OUT: 50 mL / NET: -50 mL    20 @ 07:01  -  20 @ 11:54  --------------------------------------------------------  IN: 0 mL / OUT: 0 mL / NET: 0 mL      I&O's Detail    2020 07:01  -  2020 07:00  --------------------------------------------------------  IN:    bumetanide Infusion: 25 mL    bumetanide Infusion: 20 mL    bumetanide Infusion: 140 mL    dextrose 5%: 2200 mL    dextrose 5%: 70 mL    DOBUTamine Infusion: 9 mL    DOBUTamine Infusion: 57 mL    Enteral Tube Flush: 30 mL    fentaNYL Infusion.: 225.2 mL    IV PiggyBack: 300 mL    milrinone  Infusion: 3 mL    norepinephrine Infusion: 2380 mL    norepinephrine Infusion: 1998 mL    phenylephrine   Infusion: 45.7 mL    vasopressin Infusion: 33.6 mL  Total IN: 7536.5 mL    OUT:    Indwelling Catheter - Urethral: 50 mL    Nasoenteral Tube: 300 mL  Total OUT: 350 mL    Total NET: 7186.5 mL      2020 07:01  -  2020 11:54  --------------------------------------------------------  IN:    bumetanide Infusion: 40 mL    dextrose 5%: 400 mL    DOBUTamine Infusion: 12 mL    fentaNYL Infusion.: 94.8 mL    norepinephrine Infusion: 355 mL  Total IN: 901.8 mL    OUT:  Total OUT: 0 mL    Total NET: 901.8 mL            PHYSICAL EXAM:  Constitutional: NAD  HEENT: anicteric sclera, oropharynx clear, MMM  Neck: No JVD  Respiratory: CTAB, no wheezes, rales or rhonchi  Cardiovascular: S1, S2, RRR  Gastrointestinal: BS+, soft, NT/ND  Extremities: No cyanosis or clubbing. No peripheral edema  Neurological: A/O x 3, no focal deficits  Psychiatric: Normal mood, normal affect  : No CVA tenderness. No sykes.   Skin: No rashes  Vascular Access:    LABS:      136  |  97<L>  |  65<HH>  ----------------------------<  142<H>  4.9   |  19  |  2.3<H>    Ca    7.2<L>      2020 04:00  Phos  6.9       Mg     2.2         TPro  4.4<L>  /  Alb  2.0<L>  /  TBili  0.8  /  DBili      /  AST  2238<H>  /  ALT  628<H>  /  AlkPhos  274<H>      Creatinine Trend: 2.3 <--, 1.5 <--, 1.5 <--, 1.5 <--, 1.5 <--, 1.7 <--, 1.8 <--, 1.7 <--, 1.4 <--, 1.2 <--, 1.0 <--, 1.1 <--, 1.0 <--, 1.0 <--, 0.9 <--                        9.9    12.23 )-----------( 43       ( 2020 10:25 )             28.2     Urine Studies:  Urinalysis Basic - ( 2020 09:51 )    Color: Yellow / Appearance: Clear / S.028 / pH:   Gluc:  / Ketone: Negative  / Bili: Small / Urobili: <2 mg/dL   Blood:  / Protein: 100 mg/dL / Nitrite: Negative   Leuk Esterase: Negative / RBC: 10 /HPF / WBC 17 /HPF   Sq Epi:  / Non Sq Epi: 2 /HPF / Bacteria: Negative                RADIOLOGY & ADDITIONAL STUDIES: NEPHROLOGY CONSULTATION NOTE  History obtained from chart / patient intubated on MV    64y Female on PMH metastatic small bowel neuroendocrine tumor s/p liver resection  [facility] and possible colon resection, embolization therapy, and chemo with recent diagnosed severe tricupsid regurgitation discharged Dec 2019 with Lasix.  PAtient was sent in to the hospital for management and treatment of lower ext ulcers/ wounds, became SOB on the floor sp intubation on MV . PAtient was transferred to CCU on MV and started on pressors . Patient may have had aspiration pneumonia.  Renal called for ELOINA and oligoanuria / started on Bumex drip without improvement     PAST MEDICAL & SURGICAL HISTORY:  H/O tricuspid valve disorder  Neuroendocrine cancer  S/P cholecystectomy    Allergies:  Gluten (Unknown)  lactose (Unknown)  No Known Drug Allergies    Home Medications Reviewed  Hospital Medications:   MEDICATIONS  (STANDING):  ampicillin/sulbactam  IVPB 3 Gram(s) IV Intermittent every 6 hours  buMETAnide Infusion 2 mG/Hr (10 mL/Hr) IV Continuous <Continuous>  DOBUTamine Infusion 2.5 MICROgram(s)/kG/Min (2.966 mL/Hr) IV Continuous <Continuous>  fentaNYL   Infusion. 0.5 MICROgram(s)/kG/Hr (3.955 mL/Hr) IV Continuous <Continuous>  multivitamin/minerals 1 Tablet(s) Oral daily  norepinephrine Infusion 0.05 MICROgram(s)/kG/Min (3.708 mL/Hr) IV Continuous <Continuous>  pantoprazole  Injectable 40 milliGRAM(s) IV Push two times a day  silver sulfADIAZINE 1% Cream 1 Application(s) Topical two times a day  sodium bicarbonate 650 milliGRAM(s) Oral every 8 hours      SOCIAL HISTORY:  Denies ETOH,Smoking,   FAMILY HISTORY:        REVIEW OF SYSTEMS:  All other review of systems is negative unless indicated above.    VITALS:  T(F): 98.6 (20 @ 11:00), Max: 99 (20 @ 14:25)  HR: 114 (20 @ 11:00)  BP: 95/70  RR: 20 (20 @ 11:00)  SpO2: 99% (20 @ 07:30)     @ 07:01  -   @ 07:00  --------------------------------------------------------  IN: 7536.5 mL / OUT: 350 mL / NET: 7186.5 mL     @ 07: @ 11:54  --------------------------------------------------------  IN: 901.8 mL / OUT: 0 mL / NET: 901.8 mL          20 @ 07:  -  20 @ 07:00  --------------------------------------------------------  IN: 0 mL / OUT: 50 mL / NET: -50 mL    20 @ 07:  -  20 @ 11:54  --------------------------------------------------------  IN: 0 mL / OUT: 0 mL / NET: 0 mL      I&O's Detail    2020 07:01  -  2020 07:00  --------------------------------------------------------  IN:    bumetanide Infusion: 25 mL    bumetanide Infusion: 20 mL    bumetanide Infusion: 140 mL    dextrose 5%: 2200 mL    dextrose 5%: 70 mL    DOBUTamine Infusion: 9 mL    DOBUTamine Infusion: 57 mL    Enteral Tube Flush: 30 mL    fentaNYL Infusion.: 225.2 mL    IV PiggyBack: 300 mL    milrinone  Infusion: 3 mL    norepinephrine Infusion: 2380 mL    norepinephrine Infusion: 1998 mL    phenylephrine   Infusion: 45.7 mL    vasopressin Infusion: 33.6 mL  Total IN: 7536.5 mL    OUT:    Indwelling Catheter - Urethral: 50 mL    Nasoenteral Tube: 300 mL  Total OUT: 350 mL    Total NET: 7186.5 mL      2020 07:01  -  2020 11:54  --------------------------------------------------------  IN:    bumetanide Infusion: 40 mL    dextrose 5%: 400 mL    DOBUTamine Infusion: 12 mL    fentaNYL Infusion.: 94.8 mL    norepinephrine Infusion: 355 mL  Total IN: 901.8 mL    OUT:  Total OUT: 0 mL    Total NET: 901.8 mL            PHYSICAL EXAM:  Constitutional: intubated on MV   HEENT: anicteric sclera, oropharynx clear, MMM  Neck: No JVD  Respiratory: CTAB, no wheezes, rales or rhonchi  Cardiovascular: S1, S2, RRR/ grade 5/6 2nd rics murmur holosystolic   Gastrointestinal: BS+, soft, NT/ND  Extremities: No cyanosis or clubbing. plus one edema upper and lower ext   : positive sykes   Skin: No rashes  Vascular Access:    LABS:      136  |  97<L>  |  65<HH>  ----------------------------<  142<H>  4.9   |  19  |  2.3<H>    Ca    7.2<L>      2020 04:00  Phos  6.9       Mg     2.2         TPro  4.4<L>  /  Alb  2.0<L>  /  TBili  0.8  /  DBili      /  AST  2238<H>  /  ALT  628<H>  /  AlkPhos  274<H>      Creatinine Trend: 2.3 <--, 1.5 <--, 1.5 <--, 1.5 <--, 1.5 <--, 1.7 <--, 1.8 <--, 1.7 <--, 1.4 <--, 1.2 <--, 1.0 <--, 1.1 <--, 1.0 <--, 1.0 <--, 0.9 <--                        9.9    12.23 )-----------( 43       ( 2020 10:25 )             28.2     Urine Studies:  Urinalysis Basic - ( 2020 09:51 )    Color: Yellow / Appearance: Clear / S.028 / pH:   Gluc:  / Ketone: Negative  / Bili: Small / Urobili: <2 mg/dL   Blood:  / Protein: 100 mg/dL / Nitrite: Negative   Leuk Esterase: Negative / RBC: 10 /HPF / WBC 17 /HPF   Sq Epi:  / Non Sq Epi: 2 /HPF / Bacteria: Negative        RADIOLOGY & ADDITIONAL STUDIES:    < from: Xray Chest 1 View- PORTABLE-Urgent (20 @ 09:35) >    Impression:      Basilar opacifications and effusions. Support devices as described.    < end of copied text >  < from: CT Chest w/ IV Cont (19 @ 21:05) >  IMPRESSION:    No evidence of acute pulmonary emboli.    Bilateral moderate pleural effusions. Mild cardiomegaly with reflux of   contrast within the IVC and hepatic veins compatible with right heart   dysfunction.    Multiple chest wall soft tissue nodular lesions with reactive bony   changes of the adjacent ribs bilaterally. Partially imaged upper   intra-abdominal lymphadenopathy. Mottled appearance of the vertebral   column as above. Findings are consistent with patient's known metastatic   disease.    < end of copied text >      < from: Transthoracic Echocardiogram (20 @ 10:25) >   1. Normal global left ventricular systolic function.   2. Normal left ventricular internal cavity size.   3. Severely enlarged right ventricle.   4. Structurally normal mitral valve, with normal leaflet excursion.   5. Moderate-severe tricuspid regurgitation.   6. Color flow doppler and intravenous injection of agitated saline demonstrates the presence of an intact intra atrial septum.    < end of copied text >

## 2020-02-18 NOTE — CONSULT NOTE ADULT - ATTENDING COMMENTS
infected bilateral venous stasis ulcers    rec: soap and water qd, check cultures, santyl ointment and dakins wet to dry dressing, kerlex gauze, ace sraps form toes to knees change bid    no surgery needed at this time
I examined the patient and discussed my plan with the resident  the patient is profoundly medically sick, and the finding of bloody ngt aspirate is difficult to interpret in this setting.  The ddx may include bowel ischemia, but this or other abdominal surgical diagnoses can not be definitively assigned at this time, and she is not a candidate for a surgical procedure.  We would recommend supportive care with optimization of her perfusion as the only intervention to directly impact the potential diagnosis of bowel ischemia.  Otherwise she is not a candidate for any diagnostic or surgical therapeutic intervention at this time to clarify or treat an abdominal surgical problem that would conceivably impact her clinical course for the better.
Patient examined by myself , above note read and edited where appropriate .
Pt seen, record reviewed.  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  She reportedly had surgical resection of tumor (both GI and liver) with embolization of liver met's.  Currently in hospital with marked edema and evidence of tricuspid and pulmonic valve disease with right heart failure.  In addition, pt is intermittently hypotensive.  She denies flushing, pruritus and diarrhea (pt says she has  one to three loose stools per day).  She has been managed at [facility] and recalls that octreotide therapy was used but was poorly tolerated  and was (in some way) not helpful.    Impression: history of neuro-endocrine tumor with past history of carcinoid syndrome and valvular heart disease associated with right heart failure.  She is hypotensive and consideration should be given to role of tumor-related products contributing to BP issue.    Suggest: contact physician at [facility] re prior work-up and treatment.               check blood serotonin, chromogranin A, Urinary 5-HIAA.  Will follow.
agree with above
I have personally examined the patient and reviewed the documentation above.  Corrections and edits were made wherever needed.

## 2020-02-18 NOTE — CHART NOTE - NSCHARTNOTEFT_GEN_A_CORE
Registered Dietitian Follow-Up     Patient Profile Reviewed                           Yes [x]   No []     Nutrition History Previously Obtained        Yes [x]  No []       Pertinent Subjective Information:  -Pt recently upgraded to CCU for AMS and hypotension, required intubated on 2/17 after episode of vomiting on BiPAP, likely aspirated. Note of 300cc black gastric output drained from OGT this morning. Pt to remain NPO d/t possible GIB vs ileus vs ischemic bowel segment. Case discussed at length with DUY Mason).       Pertinent Medical Interventions: CCU upgrade 2/16; intubation 2/17, on multiple pressor support   1. Multiorgan dysfunction:  Cardiogenic shock likely 2/2 RV failure/ severe TR/ pulmonary hypertension  ELOINA and oligoanuria  --per Nephro: r/o ATN/ Cardiorenal syndrome type 2 /. would be candidate for CVVHD if family agreeable.   Per surgery: possible GIB vs bowel ischemia low flow state or ischemic bowel segment, vs ileus or SBO  --s/p KUB: without obstructive gas pattern. Surgery recommends: OGT to low continuous wall suction, protonix for GI ppx. Monitor output quality and quantity- may need to replace OGT if poor functioning. Not operative candidate at this time.   2.  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  --per [facility] oncologist note in 12/2019: increasing hepatic mets, pleural mets, abdominal lymphadenopathy, osseous mets, and colon and small bowel edema with pleural effusions. progressed on PRPT and also on Everolimus.  Dispo: palliative care to meet with family to decide on GOC/ POC     Diet order: NPO      Anthropometrics:  - Ht. 182.88cm   - Wt. 82.6kg (2/18)--wt changes d/t fluid shifts. will continue to monitor and use lowest wt to estimate nutritional needs   --79.1kg (2/16)  --78kg (2/14)  - BMI 24.7 (todays wt) vs 23.3 (lowest wt)   - IBW     Pertinent Lab Data: (2/18/2020) WBC 12.23, RBC 2.62, H/H 9.9/28.2, POCT 110/131/127, glucose 142, Cl 97, BUN 65, Cr 2.3, corrected Ca 8.8, elevated LFTs, GFR 22, PO4 6.9, serum osmolality 314      Pertinent Meds: abx, protonix, sodium bicarbonate, bumetanide, dobutamine, levophed, fentanyl, multivitamin/minerals, oxy      Physical Findings:  - Appearance: intubated, sedated   - GI function: at time of RD assessment no further drainage since 300cc previously noted. LBM 2/16 firm abdomen, hypoactive bowel sounds.   - Tubes: OGT   - Oral/Mouth cavity: NPO   - Skin: 3+ generalized edema, 4+ edema b/l hands      Nutrition Requirements  Weight Used: 78kg lowest wt since admit *needs adjusted d/t intubation*      Estimated Energy Needs    Adjust [x] d/t intubation   Adjusted Energy Recommendations:  1726 kcal/day (PSE 2003b Ve: 10.7, Tmax: 37.2)     Estimated Protein Needs    Adjust [x] (1.1-1.3 g/kg CBW aim toward lower end of range, considering both intubation and ELOINA with multiorgan dysfunction. may need CVVHD and will adjust needs PRN)   Adjusted Protein Recommendations:  gm/day     Estimated Fluid Needs        Adjust [x] per CCU team      Nutrient Intake: not meeting needs d/t NPO     [x] Previous Nutrition Diagnosis: inadequate energy intake             [x] Ongoing          [] Resolved     Nutrition Intervention: coordination of nutrition related care, vitamins/minerals  Recommend:  1. NPO for bowel rest as enteral nutrition contraindicated at this time. Feeding initiation per surgery and GOC   2. If unable to advance diet and c/w POC/GOC, consult Nutrition Support Team for assessment   3. D/c multivitamin with minerals.   4. monitor serum electrolytes daily      Goal/Expected Outcome: NST c/s completed vs. EN initiation vs. EN at goal rate of >85% but <105% estimated needs vs. GOC decision. f/u in 3 days.     Indicator/Monitoring: diet order, energy intake, nutrition related labs, body composition, NFPF

## 2020-02-18 NOTE — CONSULT NOTE ADULT - ASSESSMENT
ASSESSMENT:  64y Female with hx metastatic small bowel neuroendocrine carcinoid with possible GI bleed vs bowel ischemia low flow state or ischemic bowel segment, vs ileus or SBO    PLAN:   KUB without obstructive gas pattern  - continue OGT to low continuous wall suction, protonix for GI ppx. Monitor output quality and quantity- may need to replace OGT if poor functioning  - continue supportive care and blood pressure control- avoid hypoperfusion  Not an operative candidate at this time, multiple comorbidities, unclear abdominal underlying etiology.   Surgery will follow  Discussed with Dr. Hood  --------------------------------------------------------------------------------------    02-18-20 @ 10:21

## 2020-02-18 NOTE — PROGRESS NOTE ADULT - SUBJECTIVE AND OBJECTIVE BOX
65 YO F with a PMH of metastatic neuroendocrine tumor s/p resection in liver, tricuspid regurg, and HFpEF who originally presented to [facility] with B/L LE swelling and was admitted for acute on chronic HFpEF likely due to medication non-compliance.     Overnight events:  Pt is still intubated and sedated. Concern for SBO vs ischemic bowel vs GIB. Surgery is following    ICU Vital Signs Last 24 Hrs  T(C): 37 (18 Feb 2020 11:00), Max: 37.1 (17 Feb 2020 22:00)  T(F): 98.6 (18 Feb 2020 11:00), Max: 98.8 (17 Feb 2020 22:00)  HR: 114 (18 Feb 2020 13:00) (104 - 114)  BP: --  BP(mean): --  ABP: 110/62 (18 Feb 2020 13:00) (94/50 - 142/72)  ABP(mean): 74 (18 Feb 2020 13:00) (62 - 90)  RR: 20 (18 Feb 2020 13:00) (19 - 20)  SpO2: 99% (18 Feb 2020 07:30) (98% - 100%)    General: WN/WD NAD  Neurology: A&Ox3, nonfocal, OTERO x 4  Eyes: PERRL/EOMI  ENT/Neck: Neck supple, trachea midline, No JVD  Respiratory: CTA B/L, No wheezing, rales, rhonchi  CV: RRR, S1S2, No M/G/R  Abdominal: Soft, NT, ND +BS,   Extremities: No edema, + peripheral pulses  Skin: No Rashes, Hematoma, Ecchymosis    A/P:  Acute hypoxic respiratory failure likely due to acute HFpEF, currently intubated. B/L pleural effusions. Carcinoid heart disease w/ severe TR. Adjust Vent settings as per ABG. C/w dobutamine. Cardio is following.    ELOINA, possibly ATN due to hypotension, worsening. Renal is following. Awaiting results of work-up. Likely will need CVVHD, family meeting this PM.     LE cellulitis. C/w current ABX regemin. ID consult.     Hx of  Neuroendocrine malignancy with liver MTs. follows at [facility]. Obtain records.     GI and DVT PPX. Sedation. Poor prognosis. Consider Palliative eval.

## 2020-02-18 NOTE — PROGRESS NOTE ADULT - ASSESSMENT
Pt is a 65 yo F with PMH of metastatic neuroendocrine tumor with liver and bone metastasis ,  s/p liver resection , embolization , primary tumor resection from small bowel , chronic diarrhea, severe tricuspid regurgitation with intractable right sided heart failure likely secondary to carcinoid heart is now on ventilatory support in CCU for Acute hypoxic resp failure/ Severe Pul HTN/ cor pulmonale on 2 pressors    Records from her Oncologist reviewed- pt with metastatic functional neuroendocrine Carcinoma of the small bowel, intermediate grade over many years, was on PRPT and progressed and was on everolimus with imaging in Dec 2019 that showed progression of the disease       1. Metastatic Neuroendocrine tumor who had progressed on PRPT and also on Everolimus    - Echo showing EF 60%, severely enlarged RV, severely reduced RV systolic function and large pleural effusion on the left side. Severe TR and moderate PVR. EKG showing low voltage QRS. 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  She is currently in CCU on ventilatory support and pressor support and on Bumex drip.     Will discuss above records with attending Dr Millard      # Macrocytic anemia: chronic with negative work up .   - Normal B12 and folate  TSh- 6.5   f/u  chromogranin , urine 5HIAA .      # Multiorgan dysfunction with ELOINA, elevated LFTs  On Pressor support     Overall poor prognosis   Will discuss further plan with attending

## 2020-02-18 NOTE — CHART NOTE - NSCHARTNOTEFT_GEN_A_CORE
Called to evaluate patient by nurse for OGT output. OGT drained 300cc of black gastric output. On exam, abdomen hard, no bowel sounds, involuntary guarding appreciated and patient grimacing to palpation. Extremities cold to palpation BL UE/LE, no palpable pulses due to anasarca. Minimal UO overnight Stat KUB ordered. Surgery consulted immediately.    Attempted to contact Fariha sanchez daughter to discuss current status of patient and possibility of invasive measures such as surgical intervention or however, no answer via phone.     AM labs reviewed and noted. Stat PTT, PT, INR, fibrinogen, ddimer, type and screen ordered. Called to evaluate patient by nurse for OGT output. OGT drained 300cc of black gastric output. On exam, abdomen hard, no bowel sounds, involuntary guarding appreciated and patient grimacing to palpation. Extremities cold to palpation BL UE/LE, no palpable pulses due to anasarca. Minimal UO overnight noted, with increase in BUN/Cr this AM. Stat KUB ordered. Surgery consulted immediately.    Attempted to contact Fariha sanchez daughter to discuss current status of patient and possibility of invasive measures needed in next step of medical treatment, however, no answer via phone.     AM labs reviewed and noted. Stat PTT, PT, INR, fibrinogen, ddimer, type and screen ordered. Will continue to monitor and evaluate.

## 2020-02-19 NOTE — PROGRESS NOTE ADULT - REASON FOR ADMISSION
Leg swelling, cellulitis

## 2020-02-19 NOTE — PROGRESS NOTE ADULT - ASSESSMENT
64yFemale being evaluated for goals of care in setting of multiorgan failure secondary to carcinoid syndrome with advanced neuroendocrine ca.     Pt with continued clinical decline despite aggressive medical management, being maximally supported by vent and multiple pressors.   Renal, cardio and CCU notes appreciated  Awaiting family's arrival to discuss comfort measures and end of life care as pt with previously stated wishes to defer aggressive measures at end of life.   DNR status  No RRT as no clinical benefit at this time.    Recommendations:  ongoing supportive care  If family decide to pursue comfort measures including withdrawal of pressor and vent support, suggest:   morphine 10mg IVP, robinul 0.4mg IVP prior to extubation  ativan 2mg IVP q15min prn terminal restlessness/agitation    Pt will likely rapidly  following vent and pressor withdrawal.     Please call [phone] PRN

## 2020-02-19 NOTE — PROGRESS NOTE ADULT - SUBJECTIVE AND OBJECTIVE BOX
Nephrology progress note    THIS IS AN INCOMPLETE NOTE . FULL NOTE TO FOLLOW SHORTLY    Patient is seen and examined, events over the last 24 h noted .    Allergies:  Gluten (Unknown)  lactose (Unknown)  No Known Drug Allergies    Hospital Medications:   MEDICATIONS  (STANDING):  ampicillin/sulbactam  IVPB      ampicillin/sulbactam  IVPB 3 Gram(s) IV Intermittent every 6 hours  chlorhexidine 0.12% Liquid 15 milliLiter(s) Oral Mucosa every 12 hours  chlorhexidine 4% Liquid 1 Application(s) Topical <User Schedule>  DOBUTamine Infusion 5 MICROgram(s)/kG/Min (5.933 mL/Hr) IV Continuous <Continuous>  fentaNYL   Infusion. 0.5 MICROgram(s)/kG/Hr (3.955 mL/Hr) IV Continuous <Continuous>  multivitamin/minerals 1 Tablet(s) Oral daily  norepinephrine Infusion 0.05 MICROgram(s)/kG/Min (3.708 mL/Hr) IV Continuous <Continuous>  pantoprazole  Injectable 40 milliGRAM(s) IV Push two times a day  silver sulfADIAZINE 1% Cream 1 Application(s) Topical two times a day  sodium bicarbonate 650 milliGRAM(s) Oral every 8 hours        VITALS:  T(F): 99.3 (20 @ 08:00), Max: 100 (20 @ 00:00)  HR: 102 (20 @ 08:20)  BP: --  RR: 20 (20 @ 08:00)  SpO2: 95% (20 @ 05:00)  Wt(kg): --     @ 07:01  -   @ 07:00  --------------------------------------------------------  IN: 7536.5 mL / OUT: 350 mL / NET: 7186.5 mL     @ 07:01  -   @ 07:00  --------------------------------------------------------  IN: 5875.7 mL / OUT: 172 mL / NET: 5703.7 mL     @ 07:01  -   @ 08:56  --------------------------------------------------------  IN: 633.7 mL / OUT: 0 mL / NET: 633.7 mL          PHYSICAL EXAM:  Constitutional: NAD  HEENT: anicteric sclera, oropharynx clear, MMM  Neck: No JVD  Respiratory: CTAB, no wheezes, rales or rhonchi  Cardiovascular: S1, S2, RRR  Gastrointestinal: BS+, soft, NT/ND  Extremities: No cyanosis or clubbing. No peripheral edema  :  No sykes.   Skin: No rashes    LABS:      138  |  99  |  69<HH>  ----------------------------<  19<LL>  5.5<H>   |  15<L>  |  2.7<H>    Ca    6.7<L>      2020 04:30  Phos  6.9       Mg     2.3         TPro  4.3<L>  /  Alb  2.0<L>  /  TBili  1.3<H>  /  DBili      /  AST  1374<H>  /  ALT  691<H>  /  AlkPhos  309<H>                            10.1   11.17 )-----------( 40       ( 2020 04:30 )             29.7       Urine Studies:  Urinalysis Basic - ( 2020 09:51 )    Color: Yellow / Appearance: Clear / S.028 / pH:   Gluc:  / Ketone: Negative  / Bili: Small / Urobili: <2 mg/dL   Blood:  / Protein: 100 mg/dL / Nitrite: Negative   Leuk Esterase: Negative / RBC: 10 /HPF / WBC 17 /HPF   Sq Epi:  / Non Sq Epi: 2 /HPF / Bacteria: Negative        RADIOLOGY & ADDITIONAL STUDIES: Nephrology progress note    Patient is seen and examined, events over the last 24 h noted .  still intubated on MV on pressors   Anuric   edematous     Allergies:  Gluten (Unknown)  lactose (Unknown)  No Known Drug Allergies    Hospital Medications:   MEDICATIONS  (STANDING):  ampicillin/sulbactam  IVPB      ampicillin/sulbactam  IVPB 3 Gram(s) IV Intermittent every 6 hours  DOBUTamine Infusion 5 MICROgram(s)/kG/Min (5.933 mL/Hr) IV Continuous <Continuous>  fentaNYL   Infusion. 0.5 MICROgram(s)/kG/Hr (3.955 mL/Hr) IV Continuous <Continuous>  multivitamin/minerals 1 Tablet(s) Oral daily  norepinephrine Infusion 0.05 MICROgram(s)/kG/Min (3.708 mL/Hr) IV Continuous <Continuous>  pantoprazole  Injectable 40 milliGRAM(s) IV Push two times a day  silver sulfADIAZINE 1% Cream 1 Application(s) Topical two times a day  sodium bicarbonate 650 milliGRAM(s) Oral every 8 hours        VITALS:  T(F): 99.3 (20 @ 08:00), Max: 100 (20 @ 00:00)  HR: 102 (20 @ 08:20)  BP: 95/55   RR: 20 (20 @ 08:00)  SpO2: 95% (20 @ 05:00)  Wt(kg): --     @ 07:01  -   @ 07:00  --------------------------------------------------------  IN: 7536.5 mL / OUT: 350 mL / NET: 7186.5 mL     @ 07:  -   @ 07:00  --------------------------------------------------------  IN: 5875.7 mL / OUT: 172 mL / NET: 5703.7 mL     @ 07: @ 08:56  --------------------------------------------------------  IN: 633.7 mL / OUT: 0 mL / NET: 633.7 mL          PHYSICAL EXAM:  Constitutional: intubated on MV   HEENT: anicteric sclera, oropharynx clear, MMM  Neck: No JVD  Respiratory: CTAB, no wheezes, rales or rhonchi  Cardiovascular: grade 5/6 holosystolic 2nd RICS murmur   Gastrointestinal: BS+, soft, NT/ND  Extremities: No cyanosis or clubbing. plus one edema   Skin: No rashes    LABS:      138  |  99  |  69<HH>  ----------------------------<  19<LL>  5.5<H>   |  15<L>  |  2.7<H>    Ca    6.7<L>      2020 04:30  Phos  6.9       Mg     2.3         TPro  4.3<L>  /  Alb  2.0<L>  /  TBili  1.3<H>  /  DBili      /  AST  1374<H>  /  ALT  691<H>  /  AlkPhos  309<H>                            10.1   11.17 )-----------( 40       ( 2020 04:30 )             29.7       Urine Studies:  Urinalysis Basic - ( 2020 09:51 )    Color: Yellow / Appearance: Clear / S.028 / pH:   Gluc:  / Ketone: Negative  / Bili: Small / Urobili: <2 mg/dL   Blood:  / Protein: 100 mg/dL / Nitrite: Negative   Leuk Esterase: Negative / RBC: 10 /HPF / WBC 17 /HPF   Sq Epi:  / Non Sq Epi: 2 /HPF / Bacteria: Negative        RADIOLOGY & ADDITIONAL STUDIES:  < from: US Kidney and Bladder (20 @ 17:25) >  IMPRESSION:    Normal renal and bladder ultrasound.    < end of copied text >

## 2020-02-19 NOTE — PROGRESS NOTE ADULT - ATTENDING COMMENTS
I examined the patient with the resident and discussed my plan with them  the patient has a living will that has been identified.  there is no role for surgical therapy at this time.  please reconsult prn if the patient becomes physiologically stable.
#ELOINA - possibly due to ATN from hypotension, c/w IVF, monitor for fluid overload   #Hyponatremia - improving    #Progress Note Handoff  Pending (specify):  cardio, onc and endo consults   Family discussion: dw pt plan of care for cardio, onc and endo consults   Disposition: Home
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 Cardiology, endocrine, and oncology consults. Will likely need TR replacement.   #ELOINA - start IVF, hold Lasix   #Hyponatremia - due to dehydration (diarrhea, diuresis), c/w IVF    #Progress Note Handoff  Pending (specify):  cardio. oncology, and endocrine consults   Family discussion: dw pt plan for cardio. oncology, and endocrine consults   Disposition: TBD
patient seen and examined, agree with above, severe Pul HTN, multiorgan failure worsening status prognosis grim
#Progress Note Handoff  Pending (specify):  IVF, cardio. oncology, and endo f/u   Family discussion: tina pt plan of care for cardio. oncology, and endo f/u   Disposition: STR

## 2020-02-19 NOTE — PROGRESS NOTE ADULT - ASSESSMENT
IMPRESSION:    Acute hypoxic resp failure/ Severe Pul HTN/ cor pulmonale on levophed/ off vasopressin/ cor pulmonale  Metastatic NE tumor, s/p liver resection  multiorgan failure; no UO  Thrombocytopenia    PLAN:    CNS: keep Fentanyl;     HEENT:  Oral care    PULMONARY:  HOB @ 45 degrees,  on NO, No vent changes;     CARDIOVASCULAR:  R HEART CATH, Cardio FU;  ECHO reviewed, dobutamine, taper pressors to MAP >65; [id number] 75 cc/hr     GI: GI prophylaxis        Feeding protonix   q 12h; Gastric tube to low continuous suction     RENAL:  F/u  lytes.  Correct as needed. accurate I/O , renal fu; Family hesitant in regards to CVVHD considering patient's living will wishes     INFECTIOUS DISEASE: abx    HEMATOLOGICAL:  DVT prophylaxis. FU fibrinogen level; DIC panel     ENDOCRINE:  Follow up FS.  Insulin protocol if needed. HC 50 q 6 after sending cortisol level    DISPOSITION: Pt requires continued monitoring in the MICU    PALLIATIVE CARE FU for meeting with family today  very poor prognosis  DNR DNI IMPRESSION:    Acute hypoxic resp failure/ Severe Pul HTN/ cor pulmonale on levophed/ off vasopressin/ cor pulmonale  Metastatic NE tumor, s/p liver resection  multiorgan failure; no UO  Thrombocytopenia    PLAN:    CNS: keep Fentanyl;     HEENT:  Oral care    PULMONARY:  HOB @ 45 degrees,  on NO, No vent changes;     CARDIOVASCULAR:  R HEART CATH, Cardio FU;  ECHO reviewed, dobutamine, taper pressors to MAP >65; [id number] 75 cc/hr     GI: GI prophylaxis        Feeding protonix   q 12h; Gastric tube to low continuous suction     RENAL:  F/u  lytes.  Correct as needed. accurate I/O , renal fu; Family hesitant in regards to CVVHD considering patient's living will wishes     INFECTIOUS DISEASE: abx    HEMATOLOGICAL:  DVT prophylaxis. FU fibrinogen level; DIC panel     ENDOCRINE:  Follow up FS.  Insulin protocol if needed. HC 50 q 6 after sending cortisol level    DISPOSITION: Pt requires continued monitoring in the MICU    PALLIATIVE CARE FU for meeting with family today  very poor prognosis  DNR

## 2020-02-19 NOTE — PROGRESS NOTE ADULT - SUBJECTIVE AND OBJECTIVE BOX
Patient is a 64y old  Female who presents with a chief complaint of Leg swelling, cellulitis (19 Feb 2020 08:56)    OVER NIGHT EVENTS:  Remains anuric; Levo 3.5; Dobu 5;   Low FS in AM;     PHYSICAL EXAM    ICU Vital Signs Last 24 Hrs  T(C): 37.4 (19 Feb 2020 08:00), Max: 37.8 (19 Feb 2020 00:00)  T(F): 99.3 (19 Feb 2020 08:00), Max: 100 (19 Feb 2020 00:00)  HR: 108 (19 Feb 2020 09:00) (102 - 120)  ABP: 102/50 (19 Feb 2020 09:00) (86/42 - 134/70)  ABP(mean): 62 (19 Feb 2020 09:00) (54 - 88)  RR: 20 (19 Feb 2020 09:00) (20 - 20)  SpO2: 95% (19 Feb 2020 05:00) (95% - 95%)    I&O's Detail    18 Feb 2020 07:01  -  19 Feb 2020 07:00  --------------------------------------------------------  IN:    bumetanide Infusion: 150 mL    dextrose 5%: 400 mL    DOBUTamine Infusion: 72 mL    Enteral Tube Flush: 60 mL    fentaNYL Infusion.: 568.8 mL    IV PiggyBack: 550 mL    norepinephrine Infusion: 4074.9 mL  Total IN: 5875.7 mL    OUT:    Indwelling Catheter - Urethral: 22 mL    Nasoenteral Tube: 150 mL  Total OUT: 172 mL    Total NET: 5703.7 mL      19 Feb 2020 07:01  -  19 Feb 2020 09:34  --------------------------------------------------------  IN:    DOBUTamine Infusion: 5.9 mL    DOBUTamine Infusion: 3 mL    fentaNYL Infusion.: 71.1 mL    IV PiggyBack: 350 mL    norepinephrine Infusion: 520 mL  Total IN: 950 mL    OUT:  Total OUT: 0 mL    Total NET: 950 mL    General: sedated  HEENT:  On NC  Lymph node: No palpable LN             Lungs: AFTAB over bases  Cardiovascular: RRR, S1S2  Abdomen: BS+ve; soft non tender  Extremities: anasarca   Skin:  toe ulcer   Neurological:  opens eyes to stimuli; Doesn't follow commands    LABS:                       10.1   11.17 )-----------( 40       ( 19 Feb 2020 04:30 )             29.7   02-19    138  |  99  |  69<HH>  ----------------------------<  19<LL>  5.5<H>   |  15<L>  |  2.7<H>    Heparin-PF4 AB Result: <0.6 u/mL (02.18.20 @ 16:40)    Ca    6.7<L>      19 Feb 2020 04:30  Phos  6.9     02-18  Mg     2.3     02-19    TPro  4.3<L>  /  Alb  2.0<L>  /  TBili  1.3<H>  /  DBili  x   /  AST  1374<H>  /  ALT  691<H>  /  AlkPhos  309<H>  02-19    PT/INR - ( 18 Feb 2020 05:50 )   PT: 18.90 sec;   INR: 1.64 ratio      PTT - ( 18 Feb 2020 05:50 )  PTT:36.9 sec                                                                       LIVER FUNCTIONS - ( 19 Feb 2020 04:30 )  Alb: 2.0 g/dL / Pro: 4.3 g/dL / ALK PHOS: 309 U/L / ALT: 691 U/L / AST: 1374 U/L / GGT: x          Mode: AC/ CMV (Assist Control/ Continuous Mandatory Ventilation)  RR (machine): 20  TV (machine): 450  FiO2: 60  PEEP: 5  ITime: 1  MAP: 16  PIP: 22                                        ABG - ( 19 Feb 2020 04:31 )  pH, Arterial: 7.25  pH, Blood: x     /  pCO2: 33    /  pO2: 99    / HCO3: 14    / Base Excess: -11.6 /  SaO2: 95      LA 8.7    MEDICATIONS  (STANDING):  ampicillin/sulbactam  IVPB      ampicillin/sulbactam  IVPB 3 Gram(s) IV Intermittent every 6 hours  chlorhexidine 0.12% Liquid 15 milliLiter(s) Oral Mucosa every 12 hours  chlorhexidine 4% Liquid 1 Application(s) Topical <User Schedule>  DOBUTamine Infusion 5 MICROgram(s)/kG/Min (5.933 mL/Hr) IV Continuous <Continuous>  fentaNYL   Infusion. 0.5 MICROgram(s)/kG/Hr (3.955 mL/Hr) IV Continuous <Continuous>  multivitamin/minerals 1 Tablet(s) Oral daily  norepinephrine Infusion 0.05 MICROgram(s)/kG/Min (3.708 mL/Hr) IV Continuous <Continuous>  pantoprazole  Injectable 40 milliGRAM(s) IV Push two times a day  silver sulfADIAZINE 1% Cream 1 Application(s) Topical two times a day  sodium bicarbonate 650 milliGRAM(s) Oral every 8 hours    MEDICATIONS  (PRN):      Radiology:  Cxr: Og tube ok; ETT ok; right PAC and left TLC; Bilateral effusion Patient is a 64y old  Female who presents with a chief complaint of Leg swelling, cellulitis (19 Feb 2020 08:56)    OVER NIGHT EVENTS:  Remains anuric; Levo 3.5; Dobu 5;   Low FS in AM; worsening status    PHYSICAL EXAM    ICU Vital Signs Last 24 Hrs  T(C): 37.4 (19 Feb 2020 08:00), Max: 37.8 (19 Feb 2020 00:00)  T(F): 99.3 (19 Feb 2020 08:00), Max: 100 (19 Feb 2020 00:00)  HR: 108 (19 Feb 2020 09:00) (102 - 120)  ABP: 102/50 (19 Feb 2020 09:00) (86/42 - 134/70)  ABP(mean): 62 (19 Feb 2020 09:00) (54 - 88)  RR: 20 (19 Feb 2020 09:00) (20 - 20)  SpO2: 95% (19 Feb 2020 05:00) (95% - 95%)    I&O's Detail    18 Feb 2020 07:01  -  19 Feb 2020 07:00  --------------------------------------------------------  IN:    bumetanide Infusion: 150 mL    dextrose 5%: 400 mL    DOBUTamine Infusion: 72 mL    Enteral Tube Flush: 60 mL    fentaNYL Infusion.: 568.8 mL    IV PiggyBack: 550 mL    norepinephrine Infusion: 4074.9 mL  Total IN: 5875.7 mL    OUT:    Indwelling Catheter - Urethral: 22 mL    Nasoenteral Tube: 150 mL  Total OUT: 172 mL    Total NET: 5703.7 mL      19 Feb 2020 07:01  -  19 Feb 2020 09:34  --------------------------------------------------------  IN:    DOBUTamine Infusion: 5.9 mL    DOBUTamine Infusion: 3 mL    fentaNYL Infusion.: 71.1 mL    IV PiggyBack: 350 mL    norepinephrine Infusion: 520 mL  Total IN: 950 mL    OUT:  Total OUT: 0 mL    Total NET: 950 mL    General: sedated  HEENT:  On NC  Lymph node: No palpable LN             Lungs: AFTAB over bases  Cardiovascular: RRR, S1S2  Abdomen: BS+ve; soft non tender  Extremities: anasarca   Skin:  toe ulcer   Neurological:  opens eyes to stimuli; Doesn't follow commands mottled extremities    LABS:                       10.1   11.17 )-----------( 40       ( 19 Feb 2020 04:30 )             29.7   02-19    138  |  99  |  69<HH>  ----------------------------<  19<LL>  5.5<H>   |  15<L>  |  2.7<H>    Heparin-PF4 AB Result: <0.6 u/mL (02.18.20 @ 16:40)    Ca    6.7<L>      19 Feb 2020 04:30  Phos  6.9     02-18  Mg     2.3     02-19    TPro  4.3<L>  /  Alb  2.0<L>  /  TBili  1.3<H>  /  DBili  x   /  AST  1374<H>  /  ALT  691<H>  /  AlkPhos  309<H>  02-19    PT/INR - ( 18 Feb 2020 05:50 )   PT: 18.90 sec;   INR: 1.64 ratio      PTT - ( 18 Feb 2020 05:50 )  PTT:36.9 sec                                                                       LIVER FUNCTIONS - ( 19 Feb 2020 04:30 )  Alb: 2.0 g/dL / Pro: 4.3 g/dL / ALK PHOS: 309 U/L / ALT: 691 U/L / AST: 1374 U/L / GGT: x          Mode: AC/ CMV (Assist Control/ Continuous Mandatory Ventilation)  RR (machine): 20  TV (machine): 450  FiO2: 60  PEEP: 5  ITime: 1  MAP: 16  PIP: 22                                        ABG - ( 19 Feb 2020 04:31 )  pH, Arterial: 7.25  pH, Blood: x     /  pCO2: 33    /  pO2: 99    / HCO3: 14    / Base Excess: -11.6 /  SaO2: 95      LA 8.7    MEDICATIONS  (STANDING):  ampicillin/sulbactam  IVPB      ampicillin/sulbactam  IVPB 3 Gram(s) IV Intermittent every 6 hours  chlorhexidine 0.12% Liquid 15 milliLiter(s) Oral Mucosa every 12 hours  chlorhexidine 4% Liquid 1 Application(s) Topical <User Schedule>  DOBUTamine Infusion 5 MICROgram(s)/kG/Min (5.933 mL/Hr) IV Continuous <Continuous>  fentaNYL   Infusion. 0.5 MICROgram(s)/kG/Hr (3.955 mL/Hr) IV Continuous <Continuous>  multivitamin/minerals 1 Tablet(s) Oral daily  norepinephrine Infusion 0.05 MICROgram(s)/kG/Min (3.708 mL/Hr) IV Continuous <Continuous>  pantoprazole  Injectable 40 milliGRAM(s) IV Push two times a day  silver sulfADIAZINE 1% Cream 1 Application(s) Topical two times a day  sodium bicarbonate 650 milliGRAM(s) Oral every 8 hours    MEDICATIONS  (PRN):      Radiology:  Cxr: Og tube ok; ETT ok; right PAC and left TLC; Bilateral effusion

## 2020-02-19 NOTE — PROGRESS NOTE ADULT - SUBJECTIVE AND OBJECTIVE BOX
64yFemale with diagnosis: LEG ULCER, CELLULITIS      Patient seen, clinically declining, hypotensive despite multiple pressors on max  vent support. No noted pain/distress      PHYSICAL EXAM  unchanged    T(C): , Max: 37.8 (00:00)  T(F): 99  HR: 90 (90 - 120)  BP: --  RR: 20 (20 - 20)  SpO2: 95% (95% - 95%)              LABS:                          10.1   11.17 )-----------( 40       ( 19 Feb 2020 04:30 )             29.7                                                                                      02-19    138  |  99  |  69<HH>  ----------------------------<  19<LL>  5.5<H>   |  15<L>  |  2.7<H>    Ca    6.7<L>      19 Feb 2020 04:30  Phos  6.9     02-18  Mg     2.3     02-19    TPro  4.3<L>  /  Alb  2.0<L>  /  TBili  1.3<H>  /  DBili  x   /  AST  1374<H>  /  ALT  691<H>  /  AlkPhos  309<H>  02-19                                                      MEDICATIONS  (STANDING):  ampicillin/sulbactam  IVPB      ampicillin/sulbactam  IVPB 3 Gram(s) IV Intermittent every 6 hours  chlorhexidine 0.12% Liquid 15 milliLiter(s) Oral Mucosa every 12 hours  chlorhexidine 4% Liquid 1 Application(s) Topical <User Schedule>  dextrose 5% 1000 milliLiter(s) (75 mL/Hr) IV Continuous <Continuous>  dextrose 5%. 250 milliLiter(s) (1000 mL/Hr) IV Continuous <Continuous>  DOBUTamine Infusion 25 MICROgram(s)/kG/Min (29.662 mL/Hr) IV Continuous <Continuous>  fentaNYL   Infusion. 0.5 MICROgram(s)/kG/Hr (3.955 mL/Hr) IV Continuous <Continuous>  hydrocortisone sodium succinate Injectable 50 milliGRAM(s) IV Push every 6 hours  multivitamin/minerals 1 Tablet(s) Oral daily  norepinephrine Infusion 0.05 MICROgram(s)/kG/Min (3.708 mL/Hr) IV Continuous <Continuous>  pantoprazole  Injectable 40 milliGRAM(s) IV Push two times a day  phenylephrine    Infusion 0.1 MICROgram(s)/kG/Min (1.483 mL/Hr) IV Continuous <Continuous>  silver sulfADIAZINE 1% Cream 1 Application(s) Topical two times a day  sodium bicarbonate 650 milliGRAM(s) Oral every 8 hours  sodium bicarbonate  Injectable 50 milliEquivalent(s) IV Push once  vasopressin Infusion 0.04 Unit(s)/Min (2.4 mL/Hr) IV Continuous <Continuous>    MEDICATIONS  (PRN):

## 2020-02-19 NOTE — PROGRESS NOTE ADULT - NSHPATTENDINGPLANDISCUSS_GEN_ALL_CORE
CCU team
TEAM
house staff
house staff , resident and 
house staff and medical residents during rounds
house staff and medical residents during rounds
resident, RN, CM
resident, RN, CM
team
resident, RN, CM
resident
resident

## 2020-02-19 NOTE — PROGRESS NOTE ADULT - ASSESSMENT
ASSESSMENT:  64y Female with hx metastatic small bowel neuroendocrine carcinoid with possible GI bleed vs bowel ischemia low flow state or ischemic bowel segment, vs ileus or SBO    PLAN:   KUB without obstructive gas pattern  - continue OGT to low continuous wall suction, protonix for GI ppx. Monitor output quality and quantity- may need to replace OGT if poor functioning  - continue supportive care and blood pressure control- avoid hypoperfusion  Not an operative candidate at this time, multiple comorbidities, unclear abdominal underlying etiology.   Palliative care to discuss plan of care with family today  No surgical intervention at this time  Please recall PRN ASSESSMENT:  64y Female with hx metastatic small bowel neuroendocrine carcinoid with possible GI bleed vs bowel ischemia low flow state or ischemic bowel segment, vs ileus or SBO    PLAN:   KUB without obstructive gas pattern  - continue OGT to low continuous wall suction, protonix for GI ppx. Monitor output quality and quantity- may need to replace OGT if poor functioning  - continue supportive care and blood pressure control- avoid hypoperfusion  Not an operative candidate at this time, multiple comorbidities, unclear abdominal underlying etiology.   Palliative care to discuss plan of care with family today  There is no role for surgical intervention at this time.

## 2020-02-19 NOTE — PROGRESS NOTE ADULT - SUBJECTIVE AND OBJECTIVE BOX
GENERAL SURGERY PROGRESS NOTE     JANICE COURTNEY  64y  Female  Hospital day :12d    OVERNIGHT EVENTS: no change overnight, palliative to speak to family in morning,     T(F): 100 (20 @ 00:00), Max: 100 (20 @ 00:00)  HR: 118 (20 @ 02:00) (110 - 120)  ABP: 94/50 (20 @ 02:00) (94/50 - 142/72)  ABP(mean): 64 (20 @ 02:00) (64 - 90)  RR: 20 (20 @ 02:00) (20 - 20)  SpO2: 99% (20 @ 07:30) (99% - 100%)    DIET/FLUIDS: multivitamin/minerals 1 Tablet(s) Oral daily  sodium bicarbonate 650 milliGRAM(s) Oral every 8 hours    N20 @ 07:01  -  20 @ 07:00  --------------------------------------------------------  OUT: 300 mL    URINE:   20 @ 07:01  -  20 @ 07:00  --------------------------------------------------------  OUT: 50 mL     Indwelling Urethral Catheter:     Connect To:  Straight Drainage/[place]    Indication:  Urinary Retention / Obstruction (20 @ 03:46)    GI proph:  pantoprazole  Injectable 40 milliGRAM(s) IV Push two times a day    AC/ proph:   ABx: ampicillin/sulbactam  IVPB      ampicillin/sulbactam  IVPB 3 Gram(s) IV Intermittent every 6 hours      PHYSICAL EXAM:  GENERAL: NAD, does not follow commands  CHEST/LUNG: intubated, breath sounds equal b/l  HEART: Regular rhythm, tachycardic  ABDOMEN: Soft, Nontender, anasarca, pitting on abdomen, minimal tenderness again noted LLQ  EXTREMITIES: edematous    LABS  CAPILLARY BLOOD GLUCOSE      POCT Blood Glucose.: 95 mg/dL (2020 18:04)  POCT Blood Glucose.: 127 mg/dL (2020 12:25)  POCT Blood Glucose.: 131 mg/dL (2020 07:20)  POCT Blood Glucose.: 110 mg/dL (2020 04:10)                          9.9    12.17 )-----------( 44       ( 2020 22:13 )             29.1       Auto Neutrophil %: 89.5 % (20 @ 16:50)  Auto Immature Granulocyte %: 1.5 % (20 @ 16:50)  Auto Neutrophil %: 82.6 % (20 @ 10:25)  Auto Neutrophil %: 88.3 % (20 @ 04:00)  Auto Immature Granulocyte %: 1.7 % (20 @ 04:00)        136  |  97<L>  |  65<HH>  ----------------------------<  142<H>  4.9   |  19  |  2.3<H>      Calcium, Total Serum: 7.2 mg/dL (20 @ 04:00)      LFTs:             4.4  | 0.8  | 2238     ------------------[274     ( 2020 04:00 )  2.0  | x    | 628         Lipase:x      Amylase:x         Blood Gas Arterial, Lactate: 5.3 mmoL/L (20 @ 18:27)  Blood Gas Arterial, Lactate: 4.9 mmoL/L (20 @ 13:01)  Blood Gas Arterial, Lactate: 6.4 mmoL/L (20 @ 03:18)  Blood Gas Arterial, Lactate: 6.7 mmoL/L (20 @ 15:44)  Blood Gas Arterial, Lactate: 6.1 mmoL/L (20 @ 13:15)  Blood Gas Arterial, Lactate: 6.3 mmoL/L (20 @ 13:03)  Blood Gas Arterial, Lactate: 6.2 mmoL/L (20 @ 12:57)  Blood Gas Arterial, Lactate: 6.3 mmoL/L (20 @ 12:56)  Blood Gas Arterial, Lactate: 7.4 mmoL/L (20 @ 10:29)  Blood Gas Arterial, Lactate: 3.8 mmoL/L (20 @ 06:20)  Blood Gas Arterial, Lactate: 8.1 mmoL/L (20 @ 04:53)  Blood Gas Arterial, Lactate: 5.7 mmoL/L (20 @ 03:40)    ABG - ( 2020 18:27 )  pH: 7.37  /  pCO2: 33    /  pO2: 107   / HCO3: 19    / Base Excess: -5.5  /  SaO2: x               ABG - ( 2020 13:01 )  pH: 7.41  /  pCO2: 33    /  pO2: 194   / HCO3: 21    / Base Excess: -3.0  /  SaO2: 98              ABG - ( 2020 03:18 )  pH: 7.33  /  pCO2: 35    /  pO2: 263   / HCO3: 18    / Base Excess: -6.8  /  SaO2: 100               Coags:     18.90  ----< 1.64    ( 2020 05:50 )     36.9            Serum Pro-Brain Natriuretic Peptide: 3896 pg/mL (02-15-20 @ 21:13)

## 2020-02-19 NOTE — CHART NOTE - NSCHARTNOTEFT_GEN_A_CORE
Extensive discussions w/ family throughout the day about comfort measures. This afternoon, family spoke w/ Palliative Care team and made decision for comfort measures only, to stop medical BP support, and to remove ETT; per family, she "wouldn't want to be alive w/ all these tubes in her."

## 2020-02-19 NOTE — PROGRESS NOTE ADULT - ASSESSMENT
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 now intubated on MV with ELOINA     ·	ELOINA rule out ATN ( hypotension on pressors)/ Cardiorenal syndrome type 2 / right sided heart failure / Severe TR / on 2 pressors   ·	sp BUMEx drip without improvement/ acidotic now can start bicarb drip   ·	would be a candidate for CVVHD if family agreeable/ meeting with palliative care team later on today/ patient had will for DNR DNI   ·	renal bladder sono noted no hydro  ·	check urine lytes and U creat   ·	follow BMP   ·	doubt AIN but check U eos  ·	IP noted high start phoslo one tab q8h   ·	prognosis guarded discussed with family in details yesterday  / Doubt RRT will change prognosis     d/w CCU team

## 2020-02-19 NOTE — PROGRESS NOTE ADULT - SUBJECTIVE AND OBJECTIVE BOX
Hospital Day:  12d    Subjective:    Patient is a 64y old  Female who presents with a chief complaint of Leg swelling, cellulitis (2020 09:33)    Pt seen and examined at bedside. Worsening today, SBP in the 40s requiring additional pressor support. Yesterday met with renal and discussed possibility of CVVHD, hesitant to pursue it in the context of overall prognosis given metastatic cancer and heart failure, as well as pt's living will expressing wishes for very limited interventions. Family gathering at bedside awaiting meeting with palliative care.     Past Medical Hx:   H/O tricuspid valve disorder  Neuroendocrine cancer  Cancer    Past Sx:  S/P cholecystectomy  No significant past surgical history    Allergies:  Gluten (Unknown)  lactose (Unknown)  No Known Drug Allergies    Current Meds:   Stand Meds:  ampicillin/sulbactam  IVPB      ampicillin/sulbactam  IVPB 3 Gram(s) IV Intermittent every 6 hours  chlorhexidine 0.12% Liquid 15 milliLiter(s) Oral Mucosa every 12 hours  chlorhexidine 4% Liquid 1 Application(s) Topical <User Schedule>  dextrose 5% 1000 milliLiter(s) (75 mL/Hr) IV Continuous <Continuous>  dextrose 5%. 250 milliLiter(s) (1000 mL/Hr) IV Continuous <Continuous>  DOBUTamine Infusion 25 MICROgram(s)/kG/Min (29.662 mL/Hr) IV Continuous <Continuous>  fentaNYL   Infusion. 0.5 MICROgram(s)/kG/Hr (3.955 mL/Hr) IV Continuous <Continuous>  hydrocortisone sodium succinate Injectable 50 milliGRAM(s) IV Push every 6 hours  multivitamin/minerals 1 Tablet(s) Oral daily  norepinephrine Infusion 0.05 MICROgram(s)/kG/Min (3.708 mL/Hr) IV Continuous <Continuous>  pantoprazole  Injectable 40 milliGRAM(s) IV Push two times a day  phenylephrine    Infusion 0.1 MICROgram(s)/kG/Min (1.483 mL/Hr) IV Continuous <Continuous>  silver sulfADIAZINE 1% Cream 1 Application(s) Topical two times a day  sodium bicarbonate 650 milliGRAM(s) Oral every 8 hours  sodium bicarbonate  Injectable 50 milliEquivalent(s) IV Push once  vasopressin Infusion 0.04 Unit(s)/Min (2.4 mL/Hr) IV Continuous <Continuous>    PRN Meds:    HOME MEDICATIONS:      Vital Signs:   T(F): 99 (20 @ 12:00), Max: 100 (20 @ 00:00)  HR: 92 (20 @ 13:00) (90 - 120)  BP: --  RR: 20 (20 @ 13:00) (20 - 20)  SpO2: 95% (20 @ 05:00) (95% - 95%)      20 @ 07:01  -  20 @ 07:00  --------------------------------------------------------  IN: 5875.7 mL / OUT: 172 mL / NET: 5703.7 mL    20 @ 07:01  -  20 @ 14:29  --------------------------------------------------------  IN: 3777.2 mL / OUT: 5 mL / NET: 3772.2 mL        Physical Exam:   GENERAL: NAD; sedated, ventilated  HEENT: NCAT; LT triple lumen RT swan  CHEST/LUNG: CTAB  HEART: Irregular rhythm, systolic murmur  ABDOMEN: Tense abdomen, no tenderness elicited  EXTREMITIES: Diffuse 2+ pitting edema to all extremities  NERVOUS SYSTEM:  sedated, not following commands        Labs:                         10.1   11.17 )-----------( 40       ( 2020 04:30 )             29.7     Neutophil% 89.1, Lymphocyte% 1.7, Monocyte% 6.0, Bands% 2.5 20 @ 04:30    2020 04:30    138    |  99     |  69     ----------------------------<  19     5.5     |  15     |  2.7      Ca    6.7        2020 04:30  Phos  6.9       2020 04:00  Mg     2.3       2020 04:30    TPro  4.3    /  Alb  2.0    /  TBili  1.3    /  DBili  x      /  AST  1374   /  ALT  691    /  AlkPhos  309    2020 04:30            Serum Pro-Brain Natriuretic Peptide: 3896 pg/mL (02-15-20 @ 21:13)    Troponin <0.01, CKMB 2.2,  02-15-20 @ 21:13        Urinalysis Basic - ( 2020 09:51 )    Color: Yellow / Appearance: Clear / S.028 / pH: x  Gluc: x / Ketone: Negative  / Bili: Small / Urobili: <2 mg/dL   Blood: x / Protein: 100 mg/dL / Nitrite: Negative   Leuk Esterase: Negative / RBC: 10 /HPF / WBC 17 /HPF   Sq Epi: x / Non Sq Epi: 2 /HPF / Bacteria: Negative            Radiology:       Assessment and Plan:   64yF pmhx metastatic neuroendocrine tumor s/p liver resection  and possible colon resection, embolization therapy, and chemo with recent diagnosed severe tricupsid regurgitation; multiple recent episodes of AMS upgraded to CCU, now intubated on MV with ELOINA.      # Carcinoid heart disease w/ severe TR; RT heart failure; pulmonary hypertension  - Pt has history of carcinoid heart disease - records obtained from cardiologist and oncologist  - TTE shows LVEF 65%, RV systolic dysfunction, enlarged RV, severe TR and mild pulmonary HTN. In context of carcinoid, TR and RV enlargement generally indicate shunt.   - Bedside bubble study confirms RT->LT shunt.   - CXR shows b/l pleural effusions  - On nitrous oxide, currently 15ppm.   - Bumex gtt d/c'ed as pt persistently anuric.   - Currently requiring norepinephrine, dobutamine, vasopressin. Phenylephrine added today, now weaned off.   - Heart failure Dr Solomon on case.   - monitor and replete electrolytes (potassium and magnesium)      #Acute hypoxic respiratory failure  - Pt on BPAP when upgraded to CCU; episode of emesis into mask and likely aspiration.   - Intubated, now on 50% O2 ABGs were improving, now more acidotic.    - Unasyn 3g Q6hrs.     #Coffee ground output from OG tube  - 300cc yesterday, nothing further.   - Tense abdomen, but no tenderness elicited.   - Surgery consulted, no acute intervention at this time.   - f/u RUQ US r/o ascites.     #Thrombocytopenia  - HIT panel sent.   - f/u heme/onc recs    # Episodic hypotension and diarrhea possibly secondary to carcinoid syndrome  - AM cortisol negative   - c/w imodium, c diff negative   - octreotide     # ELOINA, possibly ATN due to hypotension  - discontinued bicarb gtt due to anasarca and heart failure  - kidney function worsening, no urine output  - bumex gtt discontinued  - Sodium bicarb 650mg q8hrs via OG tube    - monitor BMP   - Possible role for CVVHD; in context of pt condition, will discuss w/ family and renal, family leaning toward not pursuing.   - f/u kidney/bladder US  - appreciate renal recs    # LE cellulitis/ open wounds   - d/c doxy  - nasal swab for MRSA is negative  - wound cx  contaminated   - c/w local wound care as per burns, ace wraps, silver sulfadiazene.   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      # DVT ppx: hold for now  # Diet: NPO  # Dispo: acute  # DNR - pt has living will which states she wanted no aggressive measures: DNR, DNI, no feeding tube.  appointed as proxy, but  has dementia. Stepdaughter Fariha [phone] has been making decision w/ pt's other stepdaughter.

## 2020-02-19 NOTE — DISCHARGE NOTE FOR THE EXPIRED PATIENT - HOSPITAL COURSE
Pt is a 65 yo F with PMH of metastatic neuroendocrine tumor s/p resection in liver, tricuspid regurg comes to the E with bilateral leg swelling and cellulitis. Pt was admitted to the [facility] in Dec 2019 for similar complaints when she was diagnosed with severe TR and discharged on po lasix. Pt states that she took the lasix for a few days after discharge but stopped taking it as she was getting lightheaded and dizzy and it didnt agree with her.     Pt was being treated for the cellulitis on the medical floors when she was noted to have episodes of altered mental status during which she was unable to be aroused, but came back to her baseline within minutes. She was placed on BPAP and upgraded to the CCU.     In the CCU, pt again experienced similar episodes. An arterial line was placed as blood pressure was unable to be ascertained due to her swelling in arms and legs. During one episode pt's blood pressure dropped, she vomited into BPAP mask, and she did not return to baseline so decision was made to intubate to protect her airway and start pressor support.     Medical decisions were complicated by her named proxy,  Titus, who has dementia. Titus's daughter's, pt's step-daughters who know pt well but were unaware of severe extent of her cancer, did not know her advance wishes. After pt intubated, they were able to locate a living will which stated Ms Castillo wanted DNR, DNI, no feeding tube, no antibiotics.     Pt required increasing amounts of BP pressors. Echo of her heart showed severely dilated RT ventricle and RT to LT shunt. Pt placed on NO via ETT; bumex gtt and was anuric.     Extensive discussions with family and a palliative care consult; family decided that they would pursue comfort measures and palliative extubation as "she would not want to live with all these tubes in her." Pt passed soon after extubation, pressors turned off. TOD 16:20.

## 2020-02-19 NOTE — GOALS OF CARE CONVERSATION - ADVANCED CARE PLANNING - TREATMENT GUIDELINE COMMENT
DNR, Plan is for palliative extubation once other family members arrive.  once extubated, Comfort measures only DNR/DNI, no escalation of care, NO LABS, NO bipap, no O2, no IV fluids, No antibiotics, no artificial nutrition.  Please see Palliative recommendations.  x [phone]

## 2020-02-20 LAB
CGA FLD-MCNC: 628 NG/ML — HIGH
CORTIS AM PEAK SERPL-MCNC: 51.2 UG/DL — HIGH (ref 6–18.4)
SRA INTERP SER-IMP: SIGNIFICANT CHANGE UP
SRA INTERP SER-IMP: SIGNIFICANT CHANGE UP

## 2020-02-21 LAB — FIBRINOGEN AG PPP IA-MCNC: 368 MG/DL — HIGH

## 2020-02-26 DIAGNOSIS — L97.919 NON-PRESSURE CHRONIC ULCER OF UNSPECIFIED PART OF RIGHT LOWER LEG WITH UNSPECIFIED SEVERITY: ICD-10-CM

## 2020-02-26 DIAGNOSIS — E87.5 HYPERKALEMIA: ICD-10-CM

## 2020-02-26 DIAGNOSIS — J96.01 ACUTE RESPIRATORY FAILURE WITH HYPOXIA: ICD-10-CM

## 2020-02-26 DIAGNOSIS — E83.42 HYPOMAGNESEMIA: ICD-10-CM

## 2020-02-26 DIAGNOSIS — C7B.8 OTHER SECONDARY NEUROENDOCRINE TUMORS: ICD-10-CM

## 2020-02-26 DIAGNOSIS — E87.4 MIXED DISORDER OF ACID-BASE BALANCE: ICD-10-CM

## 2020-02-26 DIAGNOSIS — L97.929 NON-PRESSURE CHRONIC ULCER OF UNSPECIFIED PART OF LEFT LOWER LEG WITH UNSPECIFIED SEVERITY: ICD-10-CM

## 2020-02-26 DIAGNOSIS — Z87.891 PERSONAL HISTORY OF NICOTINE DEPENDENCE: ICD-10-CM

## 2020-02-26 DIAGNOSIS — L03.115 CELLULITIS OF RIGHT LOWER LIMB: ICD-10-CM

## 2020-02-26 DIAGNOSIS — I07.1 RHEUMATIC TRICUSPID INSUFFICIENCY: ICD-10-CM

## 2020-02-26 DIAGNOSIS — R19.7 DIARRHEA, UNSPECIFIED: ICD-10-CM

## 2020-02-26 DIAGNOSIS — I27.20 PULMONARY HYPERTENSION, UNSPECIFIED: ICD-10-CM

## 2020-02-26 DIAGNOSIS — E34.0 CARCINOID SYNDROME: ICD-10-CM

## 2020-02-26 DIAGNOSIS — N17.0 ACUTE KIDNEY FAILURE WITH TUBULAR NECROSIS: ICD-10-CM

## 2020-02-26 DIAGNOSIS — C7A.1 MALIGNANT POORLY DIFFERENTIATED NEUROENDOCRINE TUMORS: ICD-10-CM

## 2020-02-26 DIAGNOSIS — N17.9 ACUTE KIDNEY FAILURE, UNSPECIFIED: ICD-10-CM

## 2020-02-26 DIAGNOSIS — I50.33 ACUTE ON CHRONIC DIASTOLIC (CONGESTIVE) HEART FAILURE: ICD-10-CM

## 2020-02-26 DIAGNOSIS — D63.8 ANEMIA IN OTHER CHRONIC DISEASES CLASSIFIED ELSEWHERE: ICD-10-CM

## 2020-02-26 DIAGNOSIS — E87.1 HYPO-OSMOLALITY AND HYPONATREMIA: ICD-10-CM

## 2020-02-26 DIAGNOSIS — Z66 DO NOT RESUSCITATE: ICD-10-CM

## 2020-02-26 DIAGNOSIS — I27.81 COR PULMONALE (CHRONIC): ICD-10-CM

## 2020-02-26 DIAGNOSIS — L03.116 CELLULITIS OF LEFT LOWER LIMB: ICD-10-CM

## 2020-02-26 DIAGNOSIS — I87.2 VENOUS INSUFFICIENCY (CHRONIC) (PERIPHERAL): ICD-10-CM

## 2020-02-26 DIAGNOSIS — E87.6 HYPOKALEMIA: ICD-10-CM

## 2020-02-26 DIAGNOSIS — Z51.5 ENCOUNTER FOR PALLIATIVE CARE: ICD-10-CM

## 2020-02-26 DIAGNOSIS — D69.6 THROMBOCYTOPENIA, UNSPECIFIED: ICD-10-CM

## 2020-02-26 DIAGNOSIS — Z91.14 PATIENT'S OTHER NONCOMPLIANCE WITH MEDICATION REGIMEN: ICD-10-CM

## 2020-02-26 DIAGNOSIS — I95.9 HYPOTENSION, UNSPECIFIED: ICD-10-CM

## 2020-02-26 DIAGNOSIS — J81.1 CHRONIC PULMONARY EDEMA: ICD-10-CM

## 2020-02-28 LAB
CORTICOSTEROID BINDING GLOBULIN RESULT: 1.6 MG/DL — LOW
CORTIS F/TOTAL MFR SERPL: 75 % — SIGNIFICANT CHANGE UP
CORTIS SERPL-MCNC: 44 UG/DL — HIGH
CORTISOL, FREE RESULT: 33 UG/DL — HIGH

## 2020-09-06 NOTE — ED ADULT TRIAGE NOTE - BSA (M2)
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 Since I saw her, she has dropped from 7 to 4 mg of prednisone every morning, where she has been for 2 months. She feels good. Good energy.   She worked with physical [employer] She will return in 6 months, after repeat inflammation markers. 2.  Right shoulder rotator cuff pathology. Resolved after injection and physical therapy. She will continue her exercises. 3.  Severe osteopenia.  She will continue 1500 mg of calcium 2.04

## 2020-11-16 NOTE — CONSULT NOTE ADULT - CONSULT REQUESTED DATE/TIME
17-Feb-2020 08:15 You can access the FollowMyHealth Patient Portal offered by [facility] by registering at the following website: [email or web address]. By joining [employer]’s FollowMyHealth portal, you will also be able to view your health information using other applications (apps) compatible with our system.

## 2021-04-14 NOTE — AIRWAY PLACEMENT NOTE ADULT - AIRWAY TUBE SIZE
7.5 Glycopyrrolate Pregnancy And Lactation Text: This medication is Pregnancy Category B and is considered safe during pregnancy. It is unknown if it is excreted breast milk.

## 2022-03-04 NOTE — ED ADULT NURSE NOTE - PSH
LM for patient to complete AHA prior to appt on 3/8/22 at 1 pm. No significant past surgical history

## 2022-04-07 NOTE — ED PROVIDER NOTE - NS_EDPROVIDERDISPOUSERTYPE_ED_A_ED
CHECKED BOOGIE AND JOB CODE DOES NOT REQUIRE A MASK FIT    TB DRAWN     DRUG SCREEN HAS BEEN COLLECTED    MMR, VARICELLA, TDAP, HEPATITIS B ARE UP TO DATE    DECLINE FLU SHOT, ALSO HAS BEEN SIGNED     Attending Attestation (For Attendings USE Only)...

## 2023-01-19 NOTE — ED ADULT NURSE NOTE - OBJECTIVE STATEMENT
Patient is a 63 yo female c/o BL LE pain, swelling, redness. seen at her pcp today, sent to ED for iv abx and admission. pt complaining of skin breakdown, weeping, and pain of bl LE. no fever or chills. no cp or sob.
.

## 2023-11-02 NOTE — CONSULT NOTE ADULT - CONSULT REQUESTED BY NAME
CCU team Niacinamide Counseling: I recommended taking niacin or niacinamide, also know as vitamin B3, twice daily. Recent evidence suggests that taking vitamin B3 (500 mg twice daily) can reduce the risk of actinic keratoses and non-melanoma skin cancers. Side effects of vitamin B3 include flushing and headache.

## 2024-07-23 NOTE — PATIENT PROFILE ADULT - NSASFUNCLEVELADLAMBULATE_GEN_A_NUR
Hospitalized or in ED since last visit: No    Medication Changes: Yes methotrexate on 7/12 (mod ddi to increase)    Upcoming/Recent Procedures: No    Referral Expiration Date Approaching: No    BP Readings from Last 1 Encounters:   06/26/24 100/62      Patient came to clinic for anticoagulation monitoring.  Last INR on 7/10/24 was 3.2.  Dose decreased.   Today's INR is 2.3 and is within goal range.    Current warfarin total weekly dose of 15 mg verified.  Informed the INR result is within therapeutic range and instructed to maintain current dose per protocol. Discussed dose and return date of 8/14/24 for next INR. See Anticoagulation flowsheet.    INR 2.3 today, back in range and down from last INR of 3.2 in two weeks on recently reduced dose of 15mg/wk.  Pt expresses concern over reddened sclera to both eyes.  Last visit at [facility], left eye only was red.  Redness not very noticeable today.  Advised pt to contact ophthalmology again. Pt also prescribed methotrexate (mod DDI to increase) 10mg weekly that she started on 7/12/24.  Pt also reports she hasn't been taking her daily prednisone for a few days.  Awaiting refill. Denies other changes or s/s of bleeding/clotting. Plan to continue dose on file of 15mg/wk.  Next INR in three weeks on 8/14/24 at pt request.  [facility] suggested two weeks.  Instructed to call [facility] with any changes, concerns or questions.  AVS printed and reviewed.       Dr. Nye is in the office today supervising the treatment.  Dr. Ibrahima Harper is referring provider.     Instructed to contact the clinic with any unusual bleeding or bruising, any changes in medications, diet, health status, lifestyle, or any other changes, questions or concerns. Verbalized understanding of all discussed.      3 = assistive equipment and person

## 2024-10-04 NOTE — H&P ADULT - DOES THIS PATIENT HAVE A HISTORY OF OR HAS BEEN DX WITH HEART FAILURE?
unknown
Pt stated that her whole family has undiagnosed anxiety and depression, stated that she has an uncle with bipolar disorder.

## 2025-07-02 NOTE — DIETITIAN INITIAL EVALUATION ADULT. - PERTINENT LABORATORY DATA
Start taking a aspirin 325mg daily.   Warm compresses 4x daily.  
(2/14/2020) WBC 3.66, RBC 2.99, H/H 10.9/31.8, Na 131, K+ 5.1, BUN 51, Cr 1.8, glucose 101, GFR 30